# Patient Record
Sex: FEMALE | Race: WHITE | NOT HISPANIC OR LATINO | Employment: OTHER | URBAN - NONMETROPOLITAN AREA
[De-identification: names, ages, dates, MRNs, and addresses within clinical notes are randomized per-mention and may not be internally consistent; named-entity substitution may affect disease eponyms.]

---

## 2018-02-22 RX ORDER — PHENOL 1.4 %
600 AEROSOL, SPRAY (ML) MUCOUS MEMBRANE DAILY
COMMUNITY

## 2018-02-22 RX ORDER — HYDROCHLOROTHIAZIDE 12.5 MG/1
12.5 TABLET ORAL DAILY
COMMUNITY
End: 2021-07-12

## 2018-02-22 RX ORDER — SIMVASTATIN 40 MG
40 TABLET ORAL NIGHTLY
COMMUNITY

## 2018-02-22 RX ORDER — MECLIZINE HYDROCHLORIDE 25 MG/1
25 TABLET ORAL 3 TIMES DAILY PRN
COMMUNITY

## 2018-02-22 RX ORDER — NIACIN 500 MG/1
500 TABLET, EXTENDED RELEASE ORAL NIGHTLY
COMMUNITY
End: 2020-05-11

## 2018-02-22 RX ORDER — FERROUS SULFATE 325(65) MG
325 TABLET ORAL
COMMUNITY

## 2018-02-22 RX ORDER — NIFEDIPINE 60 MG/1
90 TABLET, EXTENDED RELEASE ORAL DAILY
COMMUNITY

## 2018-02-22 RX ORDER — ASPIRIN 81 MG/1
81 TABLET ORAL DAILY
COMMUNITY

## 2018-02-22 RX ORDER — PROBENECID AND COLCHICINE 500; .5 MG/1; MG/1
1 TABLET ORAL DAILY
COMMUNITY
End: 2022-07-12

## 2018-02-22 RX ORDER — QUINAPRIL 20 MG/1
20 TABLET ORAL 2 TIMES DAILY
COMMUNITY

## 2018-02-26 ENCOUNTER — OFFICE VISIT (OUTPATIENT)
Dept: OTOLARYNGOLOGY | Facility: CLINIC | Age: 73
End: 2018-02-26

## 2018-02-26 VITALS
SYSTOLIC BLOOD PRESSURE: 148 MMHG | WEIGHT: 137 LBS | TEMPERATURE: 97.8 F | HEIGHT: 61 IN | BODY MASS INDEX: 25.86 KG/M2 | DIASTOLIC BLOOD PRESSURE: 88 MMHG

## 2018-02-26 DIAGNOSIS — L98.9 SKIN LESION: ICD-10-CM

## 2018-02-26 DIAGNOSIS — R22.1 PULSATILE NECK MASS: Primary | ICD-10-CM

## 2018-02-26 PROCEDURE — 99203 OFFICE O/P NEW LOW 30 MIN: CPT | Performed by: OTOLARYNGOLOGY

## 2018-02-26 PROCEDURE — 11100 PR BIOPSY OF SKIN LESION: CPT | Performed by: OTOLARYNGOLOGY

## 2018-02-26 PROCEDURE — 88305 TISSUE EXAM BY PATHOLOGIST: CPT | Performed by: OTOLARYNGOLOGY

## 2018-02-26 NOTE — PROGRESS NOTES
Michael Loyola MD     Chief Complaint   Patient presents with   • Thyroid Problem     knot on left side of neck   • Skin Lesion     right side of neck       HPI   Neela Awad is a  72 y.o. female who presents for evaluation of a left-sided neck mass.  She reports that she noticed this several months ago.  She has had a history of a benign multinodular goiter for many years.  She has had stable ultrasounds over the last several years.  At one point in time she had a needle biopsy that she reported was benign (I do not have these records).  She reports she had a recent ultrasound from her primary care physician but the records are not available.  She also has had a right sided skin lesion of her neck which she reports is itchy in nature and has been present over the last 2 months.She  reports that she has never smoked. She has never used smokeless tobacco.      Review of Systems:  Reviewed per patient intake note    Past History:  Past Medical History:   Diagnosis Date   • Anemia    • Hyperlipidemia    • Hypertension      Past Surgical History:   Procedure Laterality Date   • COLONOSCOPY     • HYSTERECTOMY     • KNEE SURGERY       Family History   Problem Relation Age of Onset   • Heart disease Other    • Diabetes Other    • Alcohol abuse Other    • Hypertension Other    • Obesity Other    • Kidney disease Other      Social History   Substance Use Topics   • Smoking status: Never Smoker   • Smokeless tobacco: Never Used   • Alcohol use No     Outpatient Prescriptions Marked as Taking for the 2/26/18 encounter (Office Visit) with Michael Loyola MD   Medication Sig Dispense Refill   • aspirin 81 MG EC tablet Take 81 mg by mouth Daily.     • calcium carbonate (OS-EMILIANA) 600 MG tablet Take 600 mg by mouth Daily.     • colchicine-probenecid (COL-BENEMID) 0.5-500 MG tablet Take 1 tablet by mouth Daily.     • ferrous sulfate 325 (65 FE) MG tablet Take 325 mg by mouth Daily With Breakfast.     • Garlic 1000 MG  capsule Take  by mouth.     • hydrochlorothiazide (HYDRODIURIL) 12.5 MG tablet Take 12.5 mg by mouth Daily.     • meclizine (ANTIVERT) 25 MG tablet Take 25 mg by mouth 3 (Three) Times a Day As Needed for dizziness.     • metoprolol succinate XL (TOPROL-XL) 25 MG 24 hr tablet Take 25 mg by mouth Daily.     • niacin (NIASPAN) 500 MG CR tablet Take 500 mg by mouth Every Night.     • NIFEdipine XL (PROCARDIA XL) 60 MG 24 hr tablet Take 60 mg by mouth Daily.     • quinapril (ACCUPRIL) 20 MG tablet Take 20 mg by mouth Every Night.     • simvastatin (ZOCOR) 40 MG tablet Take 40 mg by mouth Every Night.       Allergies:  Allopurinol    Vital Signs:   Temp:  [97.8 °F (36.6 °C)] 97.8 °F (36.6 °C)  BP: (148)/(88) 148/88    Physical Exam   CONSTITUTIONAL: well nourished, well-developed, alert, oriented, in no acute distress   COMMUNICATION AND VOICE: able to communicate normally, normal voice quality  HEAD: normocephalic, no lesions, atraumatic, no tenderness, no masses   FACE: appearance normal, no lesions, no tenderness, no deformities, facial motion symmetric  SALIVARY GLANDS: parotid glands with no tenderness, no swelling, no masses, submandibular glands with normal size, nontender  EYES: ocular motility normal, eyelids normal, orbits normal, no proptosis, conjunctiva normal , pupils equal, round  HEARING: response to conversational voice normal bilaterally   EXTERNAL EARS: auricles without lesions  EXTERNAL EAR CANALS: normal ear canals without stenosis or significant cerumen  TYMPANIC MEMBRANES: tympanic membrane appearance normal, no lesions, no perforation, normal mobility, no fluid  EXTERNAL NOSE: structure normal, no tenderness on palpation, no nasal discharge, no lesions, no evidence of trauma, nostrils patent  INTRANASAL EXAM: nasal mucosa normal, vestibule within normal limits, inferior turbinate normal,  nasal septum without overt anterior deviation  NASOPHARYNX: nasopharyngeal mucosa, adenoids within normal  "limits  LIPS: structure normal, no tenderness on palpation, no lesions, no evidence of trauma  TEETH: dentition within normal limits for age  GUMS: gingivae healthy  ORAL MUCOSA: oral mucosa normal, no mucosal lesions  FLOOR OF MOUTH: Warthin’s duct patent, mucosa normal  TONGUE: lingual mucosa normal without lesions, normal tongue mobility  PALATE: soft and hard palates with normal mucosa and structure, uvular resection present  OROPHARYNX: oropharyngeal mucosa normal, tonsil fossa normal in appearance  HYPOPHARYNX: hypopharyngeal mucosa normal  LARYNX: epiglottis and arytenoid cartilage within normal limits, vocal cord mucosa normal with normal mobility   NECK: There is a left \"neck mass\" that appears to be a prominent bend of the carotid artery.  It is smooth in nature, mobile from left to right, and has a pulsation.  There is also a 7 mm actinic skin lesion in the level II region on the right.  THYROID: no overt thyromegaly, no tenderness, nodules or mass present on palpation, position midline   LYMPH NODES: no lymphadenopathy  CHEST/RESPIRATORY: respiratory effort normal, normal breath sounds  CARDIOVASCULAR: rate and rhythm normal, extremities without cyanosis or edema, no overt jugulovenous distension present  NEUROLOGIC/PSYCHIATRIC: oriented appropriately for age, mood normal, affect appropriate, cranial nerves intact grossly unless specifically mentioned above          Procedure Note    Pre-operative Diagnosis: Skin lesion uncertain behavior of right neck     Post-operative Diagnosis: same    Anesthesia: 1% lidocaine with 1:100,000 epinephrine    Procedure:  Punch biopsy of skin    Procedure Details:    Patient consent was obtained.  The skin was cleansed with alcohol.  The skin was infiltrated with <1 mL of 1% lidocaine with 1-100,000 epinephrine.  After approximately 10 minutes, a 5 millimeter punch biopsy was taken by placing circular motion on the biopsy tool, the skin was elevated and clipped with iris " scissors.  The specimen was sent in formalin.  The skin was closed using a simple 5-0 fast absorbing gut suture.  Antibiotic ointment was placed over the biopsy site.  The patient tolerated the procedure with minimal discomfort.    Condition:  Stable.  Patient tolerated procedure well.    Complications:  None      Assessment   1. Pulsatile neck mass    2. Skin lesion        Plan    A punch biopsy was performed on the right skin lesion.  Orders Placed This Encounter   Procedures   • CT Soft Tissue Neck With Contrast   • Creatine       Return in about 4 weeks (around 3/26/2018).    Michael Loyola MD  02/26/18  11:33 AM

## 2018-02-26 NOTE — PROGRESS NOTES
Patient Intake Note    Review of Systems  Review of Systems   Constitutional: Negative for chills, fatigue and fever.   HENT:        See HPI   Respiratory: Negative for cough, choking, shortness of breath and wheezing.    Cardiovascular: Negative.    Gastrointestinal: Negative for constipation, diarrhea, nausea and vomiting.   Allergic/Immunologic: Negative for environmental allergies and food allergies.   Neurological: Negative for dizziness, light-headedness and headaches.   Hematological: Bruises/bleeds easily.   Psychiatric/Behavioral: Negative for sleep disturbance.       QUALITY MEASURES    Body Mass Index Screening and Follow-Up Plan  Body mass index is 25.89 kg/(m^2).  Patient's BMI is above normal parameters. Follow-up plan includes:  exercise counseling.    Tobacco Use: Screening and Cessation Intervention  Smoking status: Never Smoker                                                              Smokeless status: Never Used                            Loretta Coronado  2/26/2018  10:21 AM

## 2018-02-26 NOTE — PATIENT INSTRUCTIONS

## 2018-03-01 LAB
CYTO UR: NORMAL
LAB AP CASE REPORT: NORMAL
LAB AP CLINICAL INFORMATION: NORMAL
Lab: NORMAL
PATH REPORT.FINAL DX SPEC: NORMAL
PATH REPORT.GROSS SPEC: NORMAL

## 2018-03-03 ENCOUNTER — RESULTS ENCOUNTER (OUTPATIENT)
Dept: OTOLARYNGOLOGY | Facility: CLINIC | Age: 73
End: 2018-03-03

## 2018-03-03 DIAGNOSIS — R22.1 PULSATILE NECK MASS: ICD-10-CM

## 2018-03-07 DIAGNOSIS — R22.1 PULSATILE NECK MASS: ICD-10-CM

## 2018-03-19 ENCOUNTER — OFFICE VISIT (OUTPATIENT)
Dept: NEUROLOGY | Facility: CLINIC | Age: 73
End: 2018-03-19

## 2018-03-19 VITALS
WEIGHT: 138 LBS | DIASTOLIC BLOOD PRESSURE: 80 MMHG | RESPIRATION RATE: 18 BRPM | SYSTOLIC BLOOD PRESSURE: 142 MMHG | BODY MASS INDEX: 26.06 KG/M2 | HEIGHT: 61 IN | HEART RATE: 78 BPM

## 2018-03-19 DIAGNOSIS — R25.1 TREMOR: Primary | ICD-10-CM

## 2018-03-19 DIAGNOSIS — I10 ESSENTIAL (PRIMARY) HYPERTENSION: ICD-10-CM

## 2018-03-19 DIAGNOSIS — R20.0 NUMBNESS OF UPPER EXTREMITY: ICD-10-CM

## 2018-03-19 PROCEDURE — 99204 OFFICE O/P NEW MOD 45 MIN: CPT | Performed by: PSYCHIATRY & NEUROLOGY

## 2018-03-19 RX ORDER — CHOLECALCIFEROL (VITAMIN D3) 125 MCG
500 CAPSULE ORAL EVERY OTHER DAY
COMMUNITY

## 2018-03-19 NOTE — PROGRESS NOTES
Subjective   Neela Awad, 1945, is a female who is being seen today for   Chief Complaint   Patient presents with   • Tremors       HISTORY OF PRESENT ILLNESS: Patient seen for tremor.  Patient's tremors been going on over a year.  She has noticed in both hands in her neck.  Patient does not have any family history of tremor.  Patient is also having left greater than right hand numbness which she wakes up with in the morning and has to shake it out.  Patient denies any definite neck injury or head injury.  Several years ago she fell and hit her head but was not knocked unconscious.  She did not have any severe neck pain but has had some type of biopsy in the right side of her neck and is to have follow-up by Dr. Loyola on that with possible surgery.  Patient had some problems with her left eye and had shots in the eye with bleeding behind the eye supposedly related to her hypertension.  Patient is had noninvasive carotids that did not show any significant abnormalities.  Patient is had no recent blood work.  Patient is had right knee replacement.    REVIEW OF SYSTEMS:   GENERAL: As above  PULMONARY: No acute shortness of breath  CVS:  No acute chest pain  GASTROINTESTINAL: No acute GI distress  GENITOURINARY: No acute  distress  GYN: No acute GYN distress  MUSCULOSKELETAL: As above  HEENT:  As above  ENDOCRINE:  No acute endocrine symptoms  PSYCHIATRIC: No acute psychiatric symptoms  HEMATOLOGY: No blood work for review  SKIN: As above  Family history reviewed and otherwise noncontributory  Social history: Patient denies smoking or drug or alcohol use.    PHYSICAL EXAMINATION:    GENERAL: Patient has no cogwheeling or rigidity.  Patient has mild intentional tremor of the hands bilaterally.  CRANIUM: No specific tenderness over the cranium.  Normocephalic/atraumatic/atraumatic  HEENT: No acute fundic abnormalities.  Pupils equal round reactive to light.       EYES: EOMs intact without nystagmus and fields  full to confrontation       EARS:  Tympanic membranes normal hears tuning fork bilaterally       THROAT: No oropharynx abnormalities       NECK:  No bruits/no lymphadenopathy  CHEST: No acute cardiopulmonary abnormalities by auscultation  ABDOMEN: Nondistended  EXTREMITIES: Pulses symmetrical.  Negative Tinel's wrist and elbow negative Adson's maneuvers.  Negative Phalen's sign  NEURO: Patient alert and follows commands without difficulty  SPEECH:  Normal    CRANIAL NERVES:  Motor sensory about the face normal and symmetric    MOTOR STRENGTH:  Motor strength upper and lower extremities normal  STATION AND GAIT:  Gait normal/Romberg negative and no festination seen  CEREBELLAR:  Finger-nose and heel shin normal  SENSORY:  Patient has normal pin and vibration upper and lower extremities except for slight decrease in vibration in the toes bilaterally  REFLEXES:  Reflexes slightly decreased right knee jerk versus left but this probably relates to her previous knee surgery otherwise no significant abnormalities appreciated.  Toes equivocal      ASSESSMENT AND PLAN:  Patient with mild essential tremor at this point.  Patient is to get baseline MRI brain with her history of significant hypertension.  Patient is to get EMG nerve conduction both upper extremities for bilateral upper extremity numbness.Discussed the patient's BMI with her. BMI is within normal parameters. No follow-up required.       Neela was seen today for tremors.    Diagnoses and all orders for this visit:    Tremor  -     CBC & Differential; Future  -     Comprehensive Metabolic Panel; Future  -     Lipid Panel; Future  -     Magnesium; Future  -     MRI Brain Without Contrast; Future  -     Sedimentation Rate; Future  -     T4, Free; Future  -     Vitamin B12; Future  -     Folate; Future    Numbness of upper extremity  -     EMG & Nerve Conduction Test; Future    Essential (primary) hypertension   -     Lipid Panel; Future    Other orders  -      Cancel: EEG; Future

## 2018-04-09 ENCOUNTER — HOSPITAL ENCOUNTER (OUTPATIENT)
Dept: MRI IMAGING | Facility: HOSPITAL | Age: 73
Discharge: HOME | End: 2018-04-09
Attending: PSYCHIATRY & NEUROLOGY | Admitting: PSYCHIATRY & NEUROLOGY

## 2018-04-09 DIAGNOSIS — R25.1 TREMOR: ICD-10-CM

## 2018-04-09 PROCEDURE — 70551 MRI BRAIN STEM W/O DYE: CPT

## 2018-04-12 ENCOUNTER — OFFICE VISIT (OUTPATIENT)
Dept: OTOLARYNGOLOGY | Facility: CLINIC | Age: 73
End: 2018-04-12

## 2018-04-12 VITALS
SYSTOLIC BLOOD PRESSURE: 138 MMHG | BODY MASS INDEX: 26.06 KG/M2 | DIASTOLIC BLOOD PRESSURE: 82 MMHG | HEIGHT: 61 IN | WEIGHT: 138 LBS

## 2018-04-12 DIAGNOSIS — R22.1 PULSATILE NECK MASS: Primary | ICD-10-CM

## 2018-04-12 DIAGNOSIS — C44.42 SCC (SQUAMOUS CELL CARCINOMA), SCALP/NECK: ICD-10-CM

## 2018-04-12 DIAGNOSIS — E04.1 THYROID NODULE: ICD-10-CM

## 2018-04-12 PROBLEM — L98.9 SKIN LESION: Status: ACTIVE | Noted: 2018-04-12

## 2018-04-12 PROCEDURE — 99214 OFFICE O/P EST MOD 30 MIN: CPT | Performed by: OTOLARYNGOLOGY

## 2018-04-12 NOTE — PROGRESS NOTES
Loretta Coronado   Patient Intake Note    Review of Systems  Review of Systems   Constitutional: Negative for chills, fatigue and fever.   HENT:        See HPI   Respiratory: Negative for cough, choking, shortness of breath and wheezing.    Cardiovascular: Negative.    Allergic/Immunologic: Negative for environmental allergies and food allergies.   Neurological: Negative for dizziness, light-headedness and headaches.   Hematological: Does not bruise/bleed easily.   Psychiatric/Behavioral: Negative for sleep disturbance.       QUALITY MEASURES    Body Mass Index Screening and Follow-Up Plan  Body mass index is 26.07 kg/m².  Patient's Body mass index is 26.07 kg/m². BMI is above normal parameters. Follow-up plan includes:  exercise counseling.    Tobacco Use: Screening and Cessation Intervention  Smoking status: Never Smoker                                                              Smokeless tobacco: Never Used                            Loretta Coronado  4/12/2018  2:55 PM

## 2018-04-12 NOTE — PROGRESS NOTES
Mcihael Loyola MD     Chief Complaint   Patient presents with   • Follow-up     s/p punch biopsy neck mass       HPI   Neela Awad is a  72 y.o. female who is here for follow up. She had a recent CT scan of the neck which did not show mass or lesion.  I suspect her neck mass she was worried about is a prominent carotid artery on the left.  She did have thyroid nodules that showed up on the CT scan which have been noted on several previous evaluations.  She is due for a thyroid ultrasound to reevaluate it.  She also had a biopsy of her right neck which showed squamous cell carcinoma and she is scheduled to have this excised neck week.  She has several other areas on her hand that was she wants to be set up for a dermatology evaluation.    Review of Systems:  Reviewed per patient intake note    Past History:  Past medical and surgical history, family history and social history reviewed and updated when appropriate.  Current medications and allergies reviewed and updated when appropriate.  Allergies:  Allopurinol    Vital Signs:   BP: (138)/(82) 138/82    Physical Exam   CONSTITUTIONAL: well nourished, well-developed, alert, oriented, in no acute distress   COMMUNICATION AND VOICE: able to communicate normally, normal voice quality  HEAD: normocephalic, no lesions, atraumatic, no tenderness, no masses   FACE: appearance normal, no lesions, no tenderness, no deformities, facial motion symmetric  EYES: ocular motility normal, eyelids normal, orbits normal, no proptosis, conjunctiva normal , pupils equal, round  HEARING: response to conversational voice normal bilaterally   EXTERNAL EARS: auricles without lesions  EXTERNAL NOSE: structure normal, no tenderness on palpation, no nasal discharge, no lesions, no evidence of trauma, nostrils patent  LIPS: structure normal, no tenderness on palpation, no lesions, no evidence of trauma  NECK: There is a nondescript right level II sclerotic lesion of the skin, there is a  prominent left carotid artery bulb  THYROID: diffuse nodular thyroid present  LYMPH NODES: no lymphadenopathy  CHEST/RESPIRATORY: respiratory effort normal  CARDIOVASCULAR: extremities without cyanosis or edema, no overt jugulovenous distension present  NEUROLOGIC/PSYCHIATRIC: oriented appropriately for age, mood normal, affect appropriate, cranial nerves intact grossly unless specifically mentioned above         Assessment   1. Pulsatile neck mass    2. SCC (squamous cell carcinoma), scalp/neck    3. Thyroid nodule        Plan    Orders Placed This Encounter   Procedures   • US Thyroid   • TSH     She is scheduled for office excision of the right neck skin lesion.  We will get the ultrasound prior to the excision.      Michael Loyola MD  04/12/18  3:13 PM

## 2018-04-20 ENCOUNTER — CLINICAL SUPPORT (OUTPATIENT)
Dept: OTOLARYNGOLOGY | Facility: CLINIC | Age: 73
End: 2018-04-20

## 2018-04-20 ENCOUNTER — PROCEDURE VISIT (OUTPATIENT)
Dept: OTOLARYNGOLOGY | Facility: CLINIC | Age: 73
End: 2018-04-20

## 2018-04-20 DIAGNOSIS — E04.1 THYROID NODULE: ICD-10-CM

## 2018-04-20 DIAGNOSIS — C44.42 SQUAMOUS CELL CANCER OF SCALP AND SKIN OF NECK: ICD-10-CM

## 2018-04-20 DIAGNOSIS — E04.2 NONTOXIC MULTINODULAR GOITER: Primary | ICD-10-CM

## 2018-04-20 PROCEDURE — 88305 TISSUE EXAM BY PATHOLOGIST: CPT | Performed by: OTOLARYNGOLOGY

## 2018-04-20 PROCEDURE — 99213 OFFICE O/P EST LOW 20 MIN: CPT | Performed by: OTOLARYNGOLOGY

## 2018-04-20 PROCEDURE — 76536 US EXAM OF HEAD AND NECK: CPT | Performed by: OTOLARYNGOLOGY

## 2018-04-20 NOTE — PROGRESS NOTES
Michael Loyola MD   Procedure Note    Pre-operative Diagnosis:   right, neck, squamous cell carcinoma in situ    Post-operative Diagnosis: same    Anesthesia:   1% lidocaine with 1:100,000 epinephrine    Procedure:    Excision of right, level II neck, squamous cell carcinoma in situ of the skin 3 cm x 1.5 cm with primary closure    Procedure Details:    The risk benefits and alternatives were discussed and consent was given.The site was marked and injected with 1% lidocaine with 1:100,000 epinephrine. The site was prepped and draped in the usual manner. An excision was created around the right level II neck lesion with at least 3-4 mm margins. The excision was carried down to the subcutaneous fat. The lesion was then taken up off the deep tissue and removed off the patient. The specimen was marked for orientation and sent in formalin for permanent section.    Findings:   superficial and nondescript lesion with scarring at the previous biopsy site    Condition:  Stable.  Patient tolerated procedure well.    Complications:  None    Michael Loyola MD  4/20/2018  3:49 PM

## 2018-04-20 NOTE — PROGRESS NOTES
Michael Loyola MD   Chief complaint : Follow up thyroid problems    Neela Awad is a 72 y.o. female who presents for follow up of thyroid problems. She has had a history of thyroid nodules and had her ultrasound today.  This showed bilateral thyroid nodules.  She reports he is had benign fine-needle aspirations but these were many years ago.  She is here also to have a skin lesion taken off of the neck.    Review of Systems  Reviewed as per patient intake note.    History  Past History:  Past medical and surgical history, family history and social history reviewed and updated when appropriate.  Current medications and allergies reviewed and updated when appropriate.  Allergies:  Allopurinol    Vital Signs  AVSS    Physical Exam:  CONSTITUTIONAL: well nourished, well-developed, alert, oriented, in no acute distress   COMMUNICATION AND VOICE: able to communicate normally, normal voice quality  HEAD: normocephalic, no lesions, atraumatic, no tenderness, no masses   FACE: appearance normal, no lesions, no tenderness, no deformities, facial motion symmetric  EYES: ocular motility normal, eyelids normal, orbits normal, no proptosis, conjunctiva normal , pupils equal, round   EARS:  Hearing: hearing to conversational voice intact bilaterally   External Ears: normal bilaterally, no lesions  NOSE:  External Nose: external nasal structure normal, no tenderness on palpation, no nasal discharge, no lesions, no evidence of trauma, nostrils patent   ORAL:  Lips: upper and lower lips without lesion   NECK:  Inspection and Palpation: neck appearance normal, no masses or tenderness  THYROID: non-tender, no mass, nodular texture present,  CHEST/RESPIRATORY: normal respiratory effort   CARDIOVASCULAR: no cyanosis or edema   NEUROLOGICAL/PSYCHIATRIC: oriented to time, place and person, mood normal, affect appropriate, CN II-XII intact grossly        Assessment   1. Nontoxic multinodular goiter    2. Squamous cell cancer of  scalp and skin of neck        Plan   Medical and surgical options were discussed including observation vs ultrasound guided needle biopsy vs thyroidectomy. Risks, benefits and alternatives were discussed and questions were answered. After considering the options, the patient decided to proceed needle biopsy.     Orders Placed This Encounter   Procedures   • US Guided Fine Needle Aspiration   • US Guided Fine Needle Aspiration       Return in about 6 weeks (around 6/1/2018).      Michael Loyola MD  04/20/18  3:51 PM

## 2018-05-17 ENCOUNTER — HOSPITAL ENCOUNTER (OUTPATIENT)
Dept: NEUROLOGY | Facility: HOSPITAL | Age: 73
Discharge: HOME | End: 2018-05-17
Attending: PSYCHIATRY & NEUROLOGY | Admitting: PSYCHIATRY & NEUROLOGY

## 2018-05-17 DIAGNOSIS — R20.0 NUMBNESS OF UPPER EXTREMITY: ICD-10-CM

## 2018-05-17 PROCEDURE — 95886 MUSC TEST DONE W/N TEST COMP: CPT

## 2018-05-17 PROCEDURE — 95911 NRV CNDJ TEST 9-10 STUDIES: CPT

## 2018-05-17 PROCEDURE — 95911 NRV CNDJ TEST 9-10 STUDIES: CPT | Performed by: PSYCHIATRY & NEUROLOGY

## 2018-05-17 PROCEDURE — 95886 MUSC TEST DONE W/N TEST COMP: CPT | Performed by: PSYCHIATRY & NEUROLOGY

## 2018-05-31 DIAGNOSIS — I10 ESSENTIAL (PRIMARY) HYPERTENSION: ICD-10-CM

## 2018-05-31 DIAGNOSIS — R25.1 TREMOR: ICD-10-CM

## 2018-06-14 DIAGNOSIS — R25.1 TREMOR: ICD-10-CM

## 2018-07-09 ENCOUNTER — HOSPITAL ENCOUNTER (OUTPATIENT)
Dept: ULTRASOUND IMAGING | Facility: HOSPITAL | Age: 73
Discharge: HOME | End: 2018-07-09
Attending: OTOLARYNGOLOGY | Admitting: OTOLARYNGOLOGY

## 2018-07-09 VITALS — HEART RATE: 85 BPM | DIASTOLIC BLOOD PRESSURE: 68 MMHG | SYSTOLIC BLOOD PRESSURE: 157 MMHG

## 2018-07-09 DIAGNOSIS — E04.2 NONTOXIC MULTINODULAR GOITER: ICD-10-CM

## 2018-07-09 PROCEDURE — 88177 CYTP FNA EVAL EA ADDL: CPT | Performed by: OTOLARYNGOLOGY

## 2018-07-09 PROCEDURE — 76536 US EXAM OF HEAD AND NECK: CPT

## 2018-07-09 PROCEDURE — 88172 CYTP DX EVAL FNA 1ST EA SITE: CPT | Performed by: OTOLARYNGOLOGY

## 2018-07-09 PROCEDURE — 88162 CYTOPATH SMEAR OTHER SOURCE: CPT | Performed by: OTOLARYNGOLOGY

## 2018-07-09 PROCEDURE — 76942 ECHO GUIDE FOR BIOPSY: CPT

## 2018-07-09 PROCEDURE — 88334 PATH CONSLTJ SURG CYTO XM EA: CPT | Performed by: OTOLARYNGOLOGY

## 2018-07-09 PROCEDURE — 88173 CYTOPATH EVAL FNA REPORT: CPT | Performed by: OTOLARYNGOLOGY

## 2018-07-11 ENCOUNTER — TELEPHONE (OUTPATIENT)
Dept: NEUROLOGY | Facility: CLINIC | Age: 73
End: 2018-07-11

## 2018-07-11 NOTE — TELEPHONE ENCOUNTER
Neela said she received a letter that we had not received her bloodwork.  She had bloodwork done in March, copy of lab in [place].

## 2018-07-12 LAB
CYTO UR: NORMAL
LAB AP CASE REPORT: NORMAL
Lab: NORMAL
PATH REPORT.FINAL DX SPEC: NORMAL
PATH REPORT.GROSS SPEC: NORMAL

## 2018-07-13 ENCOUNTER — TELEPHONE (OUTPATIENT)
Dept: OTOLARYNGOLOGY | Facility: CLINIC | Age: 73
End: 2018-07-13

## 2018-07-16 ENCOUNTER — OFFICE VISIT (OUTPATIENT)
Dept: NEUROLOGY | Facility: CLINIC | Age: 73
End: 2018-07-16

## 2018-07-16 VITALS
HEIGHT: 61 IN | WEIGHT: 135 LBS | SYSTOLIC BLOOD PRESSURE: 128 MMHG | HEART RATE: 73 BPM | DIASTOLIC BLOOD PRESSURE: 77 MMHG | BODY MASS INDEX: 25.49 KG/M2 | OXYGEN SATURATION: 97 %

## 2018-07-16 DIAGNOSIS — G25.0 ESSENTIAL TREMOR: ICD-10-CM

## 2018-07-16 DIAGNOSIS — G56.03 BILATERAL CARPAL TUNNEL SYNDROME: Primary | ICD-10-CM

## 2018-07-16 PROCEDURE — 99213 OFFICE O/P EST LOW 20 MIN: CPT | Performed by: CLINICAL NURSE SPECIALIST

## 2018-07-16 NOTE — PROGRESS NOTES
Subjective     Chief Complaint   Patient presents with   • Tremors     Patient states that her tremors have been doing better however both hands are still going numb. Patient states the only way she gets any relief and feeling back is if she dangles her hands and shakes them.       Neela Awad is a 72 y.o. 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 Patient is here today for follow up for essential tremor and bilateral hand numbness, left more than right. She was last seen by Dr. Ugarte 3/19/18. She states tremor is about the same, does not interfere with ADLs. She feels like hand numbness has worsened. She did have labs, NCV/EMG, and MRI brain. I have reviewed results with the patient. Since last visit patient did have FNA for thyroid nodules. She has no new complaints today.   MRI brain shows chronic microvascular disease and volume loss.     Neurologic Problem   The patient's pertinent negatives include no weakness. Primary symptoms comment: tremors. This is a chronic problem. The current episode started more than 1 year ago. The neurological problem developed gradually. The problem is unchanged. Pertinent negatives include no confusion, dizziness, fatigue, nausea or vomiting. (There is change in handwriting, no difficulty holding a cup in hand, no change if rushed or in a hurry, does not drink alcohol so uncertain if would effect tremor) Past treatments include nothing. Improvement on treatment: no family hx of tremors. (No family hx of tremors)   Upper Extremity Issue   This is a chronic (bilateral hand numbness left more than right) problem. The current episode started more than 1 year ago (2017). The problem has been gradually worsening. Associated symptoms include numbness (both hands). Pertinent negatives include no arthralgias, fatigue, nausea, vomiting or weakness. Associated symptoms comments: More hand numbness when driving, notice when sitting and is usually resting elbows on objects. Does  not drop things. Can shake her hands makes feel better.     . Exacerbated by: patient does repetive movements at the restaurant. She has tried nothing for the symptoms. The treatment provided no relief.        Current Outpatient Prescriptions   Medication Sig Dispense Refill   • aspirin 81 MG EC tablet Take 81 mg by mouth Daily.     • calcium carbonate (OS-EMILIANA) 600 MG tablet Take 600 mg by mouth Daily.     • colchicine-probenecid (COL-BENEMID) 0.5-500 MG tablet Take 1 tablet by mouth Daily.     • ferrous sulfate 325 (65 FE) MG tablet Take 325 mg by mouth Daily With Breakfast.     • Garlic 1000 MG capsule Take  by mouth.     • hydrochlorothiazide (HYDRODIURIL) 12.5 MG tablet Take 12.5 mg by mouth Daily.     • meclizine (ANTIVERT) 25 MG tablet Take 25 mg by mouth 3 (Three) Times a Day As Needed for dizziness.     • metoprolol succinate XL (TOPROL-XL) 25 MG 24 hr tablet Take 25 mg by mouth Daily.     • niacin (NIASPAN) 500 MG CR tablet Take 500 mg by mouth Every Night.     • NIFEdipine XL (PROCARDIA XL) 60 MG 24 hr tablet Take 60 mg by mouth Daily.     • quinapril (ACCUPRIL) 20 MG tablet Take 20 mg by mouth Every Night.     • simvastatin (ZOCOR) 40 MG tablet Take 40 mg by mouth As Needed.     • vitamin B-12 (CYANOCOBALAMIN) 500 MCG tablet Take 500 mcg by mouth 1 (One) Time Per Week.       No current facility-administered medications for this visit.        Past Medical History:   Diagnosis Date   • Anemia    • Hyperlipidemia    • Hypertension    • SCC (squamous cell carcinoma), scalp/neck     right neck       Past Surgical History:   Procedure Laterality Date   • COLONOSCOPY     • HYSTERECTOMY     • KNEE SURGERY         family history includes Alcohol abuse in her other; Diabetes in her other; Heart disease in her other; Hypertension in her other; Kidney disease in her other; Obesity in her other.    Social History   Substance Use Topics   • Smoking status: Never Smoker   • Smokeless tobacco: Never Used   • Alcohol use  "No       Review of Systems   Constitutional: Negative.  Negative for fatigue.   HENT: Negative.    Eyes: Negative.  Negative for visual disturbance.   Respiratory: Negative.  Negative for apnea and choking.    Cardiovascular: Negative.    Gastrointestinal: Negative.  Negative for blood in stool, constipation, diarrhea, nausea and vomiting.   Endocrine: Negative.    Genitourinary: Negative.  Negative for dysuria and frequency.   Musculoskeletal: Negative for arthralgias and gait problem.   Skin: Negative.    Allergic/Immunologic: Negative.    Neurological: Positive for tremors and numbness (both hands). Negative for dizziness and weakness.   Hematological: Negative.    Psychiatric/Behavioral: Negative.  Negative for agitation, confusion and hallucinations.   All other systems reviewed and are negative.      Objective     /77 (BP Location: Left arm, Patient Position: Sitting, Cuff Size: Adult)   Pulse 73   Ht 154.9 cm (61\")   Wt 61.2 kg (135 lb)   SpO2 97%   Breastfeeding? No   BMI 25.51 kg/m² , Body mass index is 25.51 kg/m².    Physical Exam   Constitutional: She is oriented to person, place, and time. Vital signs are normal. She appears well-developed and well-nourished. She is cooperative.   HENT:   Head: Normocephalic and atraumatic.   Right Ear: Hearing and external ear normal.   Left Ear: Hearing and external ear normal.   Nose: Nose normal.   Mouth/Throat: Oropharynx is clear and moist.   Eyes: Conjunctivae, EOM and lids are normal. Pupils are equal, round, and reactive to light. Right eye exhibits normal extraocular motion and no nystagmus. Left eye exhibits normal extraocular motion and no nystagmus. Right pupil is round and reactive. Left pupil is round and reactive. Pupils are equal.   Neck: Normal range of motion. Neck supple. Carotid bruit is not present.   Cardiovascular: Normal rate, regular rhythm and normal heart sounds.    No murmur heard.  Pulmonary/Chest: Effort normal and breath " sounds normal. She has no decreased breath sounds. She has no wheezes. She has no rhonchi. She has no rales.   Abdominal: Soft. Bowel sounds are normal.   Musculoskeletal: Normal range of motion.   Neurological: She is alert and oriented to person, place, and time. She has normal strength and normal reflexes. She displays tremor (intermittent horizontal head tremor, no tongue tremor, does have tremor with intention. see line/spiral test). No cranial nerve deficit or sensory deficit. She displays a negative Romberg sign. Coordination (no ataxia, FTN, HTS intact bilateral) and gait normal.   Reflex Scores:       Tricep reflexes are 2+ on the right side and 2+ on the left side.       Bicep reflexes are 2+ on the right side and 2+ on the left side.       Brachioradialis reflexes are 2+ on the right side and 2+ on the left side.       Patellar reflexes are 2+ on the right side and 2+ on the left side.       Achilles reflexes are 2+ on the right side and 2+ on the left side.  Awake, alert. No aphasia, no dysarthria  Completes simple and complex commands    CN II:  Visual fields full.  Pupils equally reactive to light  CN III, IV, VI:  Extraocular Muscles full with no signs of nystagmus  CN V:  Facial sensory is symmetric with no asymetries.  CN VII:  Facial motor symmetric  CN VIII:  Gross hearing intact bilaterally  CN IX:  Palate elevates symmetrically  CN X:  Palate elevates symmetrically  CN XI:  Shoulder shrug symmetric  CN XII:  Tongue is midline on protrusion    Full and symmetric strength bilateral upper and lower extremities.   Skin: Skin is warm and dry.   Psychiatric: She has a normal mood and affect. Her speech is normal and behavior is normal. Cognition and memory are normal.   Nursing note and vitals reviewed.        MRI BRAIN: IMPRESSION:  1.  No acute intracranial abnormalities.  2.  Chronic microvessel changes and volume loss.    EMG: IMPRESSION:  This study may be consistent with generalized neuropathy  plus   the possibility of peripheral median and ulnar nerve irritations at some   point without acute denervation.  I cannot rule out proximal nerve/nerve   root or plexus abnormality or thoracic outlet syndrome as contributory.    Again this must be correlated with patient's presentation      NCV: Impression      Study suggests probable median nerve entrapment at or distal   to the wrist bilaterally.  There is also possibly generalized neuropathy   contributory and I cannot rule out proximal nerve/nerve root or plexus   abnormality or thoracic outlet syndrome as contributory.  This must be   correlated with patient's presentation.    ASSESSMENT/PLAN    Diagnoses and all orders for this visit:    Bilateral carpal tunnel syndrome  -      Wrist Hand Orthosis, Wrist Extension Control Cock-up    Essential tremor       MEDICAL DECISION MAKIN. Continue with toprol XL for dual therapy for cardiac reasons and essential tremor  2. Conservative treatment for CTS. Patient to wear cock up splints at night and during the day when able.  3. Essential tremor is mild at this point and not interfering with ADLs or daily activities. Will monitor.  4. Patient's Body mass index is 25.51 kg/m². BMI is above normal parameters. Recommendations include: educational material.  5. Fall Risk Assessment  Fallen in past 6 months: 0--> No  Mental Status: 0--> no mental status change  Mobility: 0--> No mobility issues  Medications: 0--> No meds  Total Fall Risk Score: 2     allergies and all known medications/prescriptions have been reviewed using resources available on this encounter.    Return in about 4 months (around 2018).        Marlene Al, APRN

## 2018-07-16 NOTE — PATIENT INSTRUCTIONS
Exercising to Lose Weight  Exercising can help you to lose weight. In order to lose weight through exercise, you need to do vigorous-intensity exercise. You can tell that you are exercising with vigorous intensity if you are breathing very hard and fast and cannot hold a conversation while exercising.  Moderate-intensity exercise helps to maintain your current weight. You can tell that you are exercising at a moderate level if you have a higher heart rate and faster breathing, but you are still able to hold a conversation.  How often should I exercise?  Choose an activity that you enjoy and set realistic goals. Your health care provider can help you to make an activity plan that works for you. Exercise regularly as directed by your health care provider. This may include:  · Doing resistance training twice each week, such as:  ? Push-ups.  ? Sit-ups.  ? Lifting weights.  ? Using resistance bands.  · Doing a given intensity of exercise for a given amount of time. Choose from these options:  ? 150 minutes of moderate-intensity exercise every week.  ? 75 minutes of vigorous-intensity exercise every week.  ? A mix of moderate-intensity and vigorous-intensity exercise every week.    Children, pregnant women, people who are out of shape, people who are overweight, and older adults may need to consult a health care provider for individual recommendations. If you have any sort of medical condition, be sure to consult your health care provider before starting a new exercise program.  What are some activities that can help me to lose weight?  · Walking at a rate of at least 4.5 miles an hour.  · Jogging or running at a rate of 5 miles per hour.  · Biking at a rate of at least 10 miles per hour.  · Lap swimming.  · Roller-skating or in-line skating.  · Cross-country skiing.  · Vigorous competitive sports, such as football, basketball, and soccer.  · Jumping rope.  · Aerobic dancing.  How can I be more active in my day-to-day  activities?  · Use the stairs instead of the elevator.  · Take a walk during your lunch break.  · If you drive, park your car farther away from work or school.  · If you take public transportation, get off one stop early and walk the rest of the way.  · Make all of your phone calls while standing up and walking around.  · Get up, stretch, and walk around every 30 minutes throughout the day.  What guidelines should I follow while exercising?  · Do not exercise so much that you hurt yourself, feel dizzy, or get very short of breath.  · Consult your health care provider prior to starting a new exercise program.  · Wear comfortable clothes and shoes with good support.  · Drink plenty of water while you exercise to prevent dehydration or heat stroke. Body water is lost during exercise and must be replaced.  · Work out until you breathe faster and your heart beats faster.  This information is not intended to replace advice given to you by your health care provider. Make sure you discuss any questions you have with your health care provider.  Document Released: 01/20/2012 Document Revised: 05/25/2017 Document Reviewed: 05/21/2015  [employer] Patient Education © 2018 [employer]  Multiple Sclerosis  Multiple sclerosis (MS) is a disease of the central nervous system. It leads to the loss of the insulating covering of the nerves (myelin sheath) of your brain. When this happens, brain signals do not get sent properly or may not get sent at all. The age of onset of MS varies.  What are the causes?  The cause of MS is unknown. However, it is more common in the [place] than in the [place].  What increases the risk?  There is a higher number of women with MS than men. MS is not an illness that is passed down to you from your family members (inherited). However, your risk of MS is higher if you have a relative with MS.  What are the signs or symptoms?  The symptoms of MS occur in episodes  or attacks. These attacks may last weeks to months. There may be long periods of almost no symptoms between attacks. The symptoms of MS vary. This is because of the many different ways it affects the central nervous system. The main symptoms of MS include:  · Vision problems and eye pain.  · Numbness.  · Weakness.  · Inability to move your arms, hands, feet, or legs (paralysis).  · Balance problems.  · Tremors.    How is this diagnosed?  Your health care provider can diagnose MS with the help of imaging exams and lab tests. These may include specialized X-ray exams and spinal fluid tests. The best imaging exam to confirm a diagnosis of MS is an MRI.  How is this treated?  There is no known cure for MS, but there are medicines that can decrease the number and frequency of attacks. Steroids are often used for short-term relief. Physical and occupational therapy may also help. There are also many new alternative or complementary treatments available to help control the symptoms of MS. Ask your health care provider if any of these other options are right for you.  Follow these instructions at home:  · Take medicines as directed by your health care provider.  · Exercise as directed by your health care provider.  Contact a health care provider if:  You begin to feel depressed.  Get help right away if:  · You develop paralysis.  · You have problems with bladder, bowel, or sexual function.  · You develop mental changes, such as forgetfulness or mood swings.  · You have a period of uncontrolled movements (seizure).  This information is not intended to replace advice given to you by your health care provider. Make sure you discuss any questions you have with your health care provider.  Document Released: 12/15/2001 Document Revised: 05/25/2017 Document Reviewed: 08/25/2014  [employer] Interactive Patient Education © 2017 [employer]

## 2018-11-19 ENCOUNTER — OFFICE VISIT (OUTPATIENT)
Dept: NEUROLOGY | Facility: CLINIC | Age: 73
End: 2018-11-19

## 2018-11-19 VITALS
HEART RATE: 76 BPM | SYSTOLIC BLOOD PRESSURE: 122 MMHG | WEIGHT: 139 LBS | DIASTOLIC BLOOD PRESSURE: 72 MMHG | BODY MASS INDEX: 26.24 KG/M2 | HEIGHT: 61 IN

## 2018-11-19 DIAGNOSIS — G56.03 BILATERAL CARPAL TUNNEL SYNDROME: ICD-10-CM

## 2018-11-19 DIAGNOSIS — G25.0 ESSENTIAL TREMOR: Primary | ICD-10-CM

## 2018-11-19 PROCEDURE — 99213 OFFICE O/P EST LOW 20 MIN: CPT | Performed by: CLINICAL NURSE SPECIALIST

## 2018-11-19 NOTE — PROGRESS NOTES
Subjective     Chief Complaint   Patient presents with   • Tremors     No change   • Bilateral carpal tunnel syndrome     No change- no new concerns or complaints. She states she did not get an order for wrist splints when I asked her about this. 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 She was last seen 7/18. She states tremor is about the same, does not interfere with ADLs. She notices more tremor with fine motor when she is painting arts/crafts. She does at times notice mild head and voice tremor. She feels like hand numbness has worsened. She was to get wrist splints bilateral but has not done so.   She has no new complaints. She has hx of anemia, HTN, dyslipidemia, SCC.     Neurologic Problem   The patient's pertinent negatives include no weakness. Primary symptoms comment: tremors. This is a chronic problem. The current episode started more than 1 year ago. The neurological problem developed gradually. The problem is unchanged. Pertinent negatives include no confusion, dizziness, fatigue, nausea or vomiting. (There is change in handwriting, no difficulty holding a cup in hand, no change if rushed or in a hurry, does not drink alcohol so uncertain if would effect tremor) Past treatments include nothing. Improvement on treatment: no family hx of tremors. (No family hx of tremors)   Upper Extremity Issue   This is a chronic (bilateral hand numbness left more than right) problem. The current episode started more than 1 year ago (2017). The problem has been gradually worsening. Associated symptoms include numbness (both hands). Pertinent negatives include no arthralgias, fatigue, nausea, vomiting or weakness. Associated symptoms comments: More hand numbness when driving, notice when sitting and is usually resting  elbows on objects. Does not drop things. Can shake her hands makes feel better.     . Exacerbated by: patient does repetive movements at the restaurant. She has tried nothing for the symptoms. The treatment provided no relief.        Current Outpatient Medications   Medication Sig Dispense Refill   • aspirin 81 MG EC tablet Take 81 mg by mouth Daily.     • calcium carbonate (OS-EMILIANA) 600 MG tablet Take 600 mg by mouth Daily.     • colchicine-probenecid (COL-BENEMID) 0.5-500 MG tablet Take 1 tablet by mouth Daily.     • ferrous sulfate 325 (65 FE) MG tablet Take 325 mg by mouth Daily With Breakfast.     • Garlic 1000 MG capsule Take  by mouth.     • hydrochlorothiazide (HYDRODIURIL) 12.5 MG tablet Take 12.5 mg by mouth Daily.     • meclizine (ANTIVERT) 25 MG tablet Take 25 mg by mouth 3 (Three) Times a Day As Needed for dizziness.     • metoprolol succinate XL (TOPROL-XL) 25 MG 24 hr tablet Take 25 mg by mouth Daily.     • niacin (NIASPAN) 500 MG CR tablet Take 500 mg by mouth Every Night.     • NIFEdipine XL (PROCARDIA XL) 60 MG 24 hr tablet Take 60 mg by mouth Daily.     • quinapril (ACCUPRIL) 20 MG tablet Take 20 mg by mouth Every Night.     • simvastatin (ZOCOR) 40 MG tablet Take 40 mg by mouth As Needed.     • vitamin B-12 (CYANOCOBALAMIN) 500 MCG tablet Take 500 mcg by mouth 1 (One) Time Per Week.       No current facility-administered medications for this visit.        Past Medical History:   Diagnosis Date   • Anemia    • Hyperlipidemia    • Hypertension    • SCC (squamous cell carcinoma), scalp/neck     right neck       Past Surgical History:   Procedure Laterality Date   • COLONOSCOPY     • HYSTERECTOMY     • KNEE SURGERY         family history includes Alcohol abuse in her other; Diabetes in her other; Heart disease in her other; Hypertension in her other; Kidney disease in her other; Obesity in her other.    Social History     Tobacco Use   • Smoking status: Never Smoker   • Smokeless tobacco: Never Used  "  Substance Use Topics   • Alcohol use: No   • Drug use: No       Review of Systems   Constitutional: Negative.  Negative for fatigue.   HENT: Negative.  Negative for postnasal drip and sinus pressure.    Eyes: Negative.  Negative for visual disturbance.   Respiratory: Negative.  Negative for apnea and choking.    Cardiovascular: Negative.    Gastrointestinal: Negative.  Negative for blood in stool, constipation, diarrhea, nausea and vomiting.   Endocrine: Negative.  Negative for cold intolerance and heat intolerance.   Genitourinary: Negative.  Negative for dysuria and frequency.   Musculoskeletal: Negative for arthralgias and gait problem.   Skin: Negative.    Allergic/Immunologic: Negative.    Neurological: Positive for tremors and numbness (both hands). Negative for dizziness and weakness.   Hematological: Negative.    Psychiatric/Behavioral: Negative.  Negative for agitation, confusion and hallucinations.   All other systems reviewed and are negative.      Objective     /72   Pulse 76   Ht 154.9 cm (61\")   Wt 63 kg (139 lb)   BMI 26.26 kg/m² , Body mass index is 26.26 kg/m².    Physical Exam   Constitutional: She is oriented to person, place, and time. Vital signs are normal. She appears well-developed and well-nourished. She is cooperative.   HENT:   Head: Normocephalic and atraumatic.   Right Ear: Hearing and external ear normal.   Left Ear: Hearing and external ear normal.   Nose: Nose normal.   Mouth/Throat: Oropharynx is clear and moist.   Eyes: Conjunctivae, EOM and lids are normal. Pupils are equal, round, and reactive to light. Right eye exhibits normal extraocular motion and no nystagmus. Left eye exhibits normal extraocular motion and no nystagmus. Right pupil is round and reactive. Left pupil is round and reactive. Pupils are equal.   Neck: Normal range of motion. Neck supple. Carotid bruit is not present.   Cardiovascular: Normal rate, regular rhythm and normal heart sounds.   No murmur " heard.  Pulmonary/Chest: Effort normal and breath sounds normal. She has no decreased breath sounds. She has no wheezes. She has no rhonchi. She has no rales.   Abdominal: Soft. Bowel sounds are normal.   Musculoskeletal: Normal range of motion.   Neurological: She is alert and oriented to person, place, and time. She has normal strength and normal reflexes. She displays tremor (intermittent horizontal head tremor, no tongue tremor, does have tremor with intention. see line/spiral test). No cranial nerve deficit or sensory deficit. She displays a negative Romberg sign. Coordination (no ataxia, FTN, HTS intact bilateral) and gait normal.   Reflex Scores:       Tricep reflexes are 2+ on the right side and 2+ on the left side.       Bicep reflexes are 2+ on the right side and 2+ on the left side.       Brachioradialis reflexes are 2+ on the right side and 2+ on the left side.       Patellar reflexes are 2+ on the right side and 2+ on the left side.       Achilles reflexes are 2+ on the right side and 2+ on the left side.  Awake, alert. No aphasia, no dysarthria  Completes simple and complex commands    CN II:  Visual fields full.  Pupils equally reactive to light  CN III, IV, VI:  Extraocular Muscles full with no signs of nystagmus  CN V:  Facial sensory is symmetric with no asymetries.  CN VII:  Facial motor symmetric  CN VIII:  Gross hearing intact bilaterally  CN IX:  Palate elevates symmetrically  CN X:  Palate elevates symmetrically  CN XI:  Shoulder shrug symmetric  CN XII:  Tongue is midline on protrusion    Full and symmetric strength bilateral upper and lower extremities.   Skin: Skin is warm and dry.   Psychiatric: She has a normal mood and affect. Her speech is normal and behavior is normal. Cognition and memory are normal.   Nursing note and vitals reviewed.           ASSESSMENT/PLAN    Diagnoses and all orders for this visit:    Essential tremor    Bilateral carpal tunnel syndrome      MEDICAL DECISION  MAKIN. Continue with toprol XL for dual therapy for cardiac reasons and essential tremor. Patient states tremor mild and not interested in starting other medications at this time.   2. Conservative treatment for CTS. Patient to wear cock up splints at night and during the day when able.  3. Essential tremor is mild at this point and not interfering with ADLs or daily activities. Will monitor.  4. Patient's Body mass index is 25.51 kg/m². BMI is above normal parameters. Recommendations include: educational material.      HPI and ROS reviewed and updated.      allergies and all known medications/prescriptions have been reviewed using resources available on this encounter.    Return in about 6 months (around 2019).        Marlene Al, APRN

## 2019-05-20 ENCOUNTER — OFFICE VISIT (OUTPATIENT)
Dept: NEUROLOGY | Facility: CLINIC | Age: 74
End: 2019-05-20

## 2019-05-20 VITALS
SYSTOLIC BLOOD PRESSURE: 124 MMHG | WEIGHT: 141 LBS | BODY MASS INDEX: 26.62 KG/M2 | DIASTOLIC BLOOD PRESSURE: 78 MMHG | HEART RATE: 62 BPM | HEIGHT: 61 IN

## 2019-05-20 DIAGNOSIS — G25.0 ESSENTIAL TREMOR: Primary | ICD-10-CM

## 2019-05-20 DIAGNOSIS — G56.03 BILATERAL CARPAL TUNNEL SYNDROME: ICD-10-CM

## 2019-05-20 PROCEDURE — 99213 OFFICE O/P EST LOW 20 MIN: CPT | Performed by: CLINICAL NURSE SPECIALIST

## 2019-05-20 NOTE — PROGRESS NOTES
Subjective     Chief Complaint   Patient presents with   • Tremors       Neela Awad is a 73 y.o. female right handed. she is by herself. She ambulates independently. Patient is here today for follow up for essential tremor and bilateral hand numbness, left more than right. She was last seen 11/18. She states tremor is about the same, does not interfere with ADLs. She notices more tremor with fine motor when she is painting arts/crafts. She does at times notice mild head and voice tremor. She feels like hand numbness has improved. Will occasionally have numbness in left hand when driving.  She has no new complaints. She has hx of anemia, HTN, dyslipidemia, SCC.     Neurologic Problem   The patient's pertinent negatives include no weakness. Primary symptoms comment: tremors. This is a chronic problem. The current episode started more than 1 year ago. The neurological problem developed gradually. The problem is unchanged. Pertinent negatives include no confusion, dizziness, fatigue, nausea or vomiting. (There is change in handwriting, no difficulty holding a cup in hand, no change if rushed or in a hurry, does not drink alcohol so uncertain if would effect tremor) Past treatments include nothing. Improvement on treatment: no family hx of tremors. (No family hx of tremors)   Upper Extremity Issue   This is a chronic (bilateral hand numbness left more than right) problem. The current episode started more than 1 year ago (2017). The problem has been gradually worsening. Associated symptoms include numbness (both hands). Pertinent negatives include no arthralgias (left knee pain), fatigue, nausea, vomiting or weakness. Associated symptoms comments: More hand numbness when driving, notice when sitting and is usually resting elbows on objects. Does not drop things. Can shake her hands makes feel better.     . Exacerbated by: patient does repetive movements at the restaurant. She has tried nothing for the symptoms. The  treatment provided no relief.        Current Outpatient Medications   Medication Sig Dispense Refill   • aspirin 81 MG EC tablet Take 81 mg by mouth Daily.     • calcium carbonate (OS-EMILIANA) 600 MG tablet Take 600 mg by mouth Daily.     • colchicine-probenecid (COL-BENEMID) 0.5-500 MG tablet Take 1 tablet by mouth Daily.     • ferrous sulfate 325 (65 FE) MG tablet Take 325 mg by mouth Daily With Breakfast.     • Garlic 1000 MG capsule Take  by mouth Daily.     • hydrochlorothiazide (HYDRODIURIL) 12.5 MG tablet Take 12.5 mg by mouth Daily.     • meclizine (ANTIVERT) 25 MG tablet Take 25 mg by mouth 3 (Three) Times a Day As Needed for dizziness.     • metoprolol succinate XL (TOPROL-XL) 25 MG 24 hr tablet Take 25 mg by mouth Daily.     • niacin (NIASPAN) 500 MG CR tablet Take 500 mg by mouth Every Night.     • NIFEdipine XL (PROCARDIA XL) 60 MG 24 hr tablet Take 60 mg by mouth Daily.     • quinapril (ACCUPRIL) 20 MG tablet Take 20 mg by mouth Every Night.     • simvastatin (ZOCOR) 40 MG tablet Take 40 mg by mouth As Needed.     • vitamin B-12 (CYANOCOBALAMIN) 500 MCG tablet Take 500 mcg by mouth 1 (One) Time Per Week.       No current facility-administered medications for this visit.        Past Medical History:   Diagnosis Date   • Anemia    • Hyperlipidemia    • Hypertension    • SCC (squamous cell carcinoma), scalp/neck     right neck       Past Surgical History:   Procedure Laterality Date   • COLONOSCOPY     • HYSTERECTOMY     • KNEE SURGERY         family history includes Alcohol abuse in her other; Diabetes in her other; Heart disease in her other; Hypertension in her other; Kidney disease in her other; Obesity in her other.    Social History     Tobacco Use   • Smoking status: Never Smoker   • Smokeless tobacco: Never Used   Substance Use Topics   • Alcohol use: No   • Drug use: No       Review of Systems   Constitutional: Negative.  Negative for fatigue.   HENT: Negative.  Negative for postnasal drip and sinus  "pressure.    Eyes: Negative.  Negative for visual disturbance.   Respiratory: Negative.  Negative for apnea and choking.    Cardiovascular: Positive for leg swelling.   Gastrointestinal: Negative.  Negative for blood in stool, constipation, diarrhea, nausea and vomiting.   Endocrine: Negative.  Negative for cold intolerance and heat intolerance.   Genitourinary: Negative.  Negative for dysuria and frequency.   Musculoskeletal: Negative for arthralgias (left knee pain) and gait problem.   Skin: Negative.    Allergic/Immunologic: Negative.    Neurological: Positive for tremors and numbness (both hands). Negative for dizziness and weakness.   Hematological: Negative.    Psychiatric/Behavioral: Negative.  Negative for agitation, confusion and hallucinations.   All other systems reviewed and are negative.      Objective     /78   Pulse 62   Ht 154.9 cm (61\")   Wt 64 kg (141 lb)   Breastfeeding? No   BMI 26.64 kg/m² , Body mass index is 26.64 kg/m².    Physical Exam   Constitutional: She is oriented to person, place, and time. Vital signs are normal. She appears well-developed and well-nourished. She is cooperative.   HENT:   Head: Normocephalic and atraumatic.   Right Ear: Hearing and external ear normal.   Left Ear: Hearing and external ear normal.   Nose: Nose normal.   Mouth/Throat: Oropharynx is clear and moist.   Eyes: Conjunctivae, EOM and lids are normal. Pupils are equal, round, and reactive to light. Right eye exhibits normal extraocular motion and no nystagmus. Left eye exhibits normal extraocular motion and no nystagmus. Right pupil is round and reactive. Left pupil is round and reactive. Pupils are equal.   Neck: Normal range of motion. Neck supple. Carotid bruit is not present.   Cardiovascular: Normal rate, regular rhythm and normal heart sounds.   No murmur heard.  Pulmonary/Chest: Effort normal and breath sounds normal. She has no decreased breath sounds. She has no wheezes. She has no rhonchi. " She has no rales.   Abdominal: Soft. Bowel sounds are normal.   Musculoskeletal: Normal range of motion.   Neurological: She is alert and oriented to person, place, and time. She has normal strength and normal reflexes. She displays tremor (intermittent horizontal head tremor, no tongue tremor, does have tremor with intention. see line/spiral test). No cranial nerve deficit or sensory deficit. She exhibits normal muscle tone. She displays a negative Romberg sign. Coordination (no ataxia, FTN, HTS intact bilateral) and gait normal.   Reflex Scores:       Tricep reflexes are 2+ on the right side and 2+ on the left side.       Bicep reflexes are 2+ on the right side and 2+ on the left side.       Brachioradialis reflexes are 2+ on the right side and 2+ on the left side.       Patellar reflexes are 2+ on the right side and 2+ on the left side.       Achilles reflexes are 2+ on the right side and 2+ on the left side.  Awake, alert. No aphasia, no dysarthria  Completes simple and complex commands    CN II:  Visual fields full.  Pupils equally reactive to light  CN III, IV, VI:  Extraocular Muscles full with no signs of nystagmus  CN V:  Facial sensory is symmetric with no asymetries.  CN VII:  Facial motor symmetric  CN VIII:  Gross hearing intact bilaterally  CN IX:  Palate elevates symmetrically  CN X:  Palate elevates symmetrically  CN XI:  Shoulder shrug symmetric  CN XII:  Tongue is midline on protrusion    Full and symmetric strength bilateral upper and lower extremities.   Skin: Skin is warm and dry.   Psychiatric: She has a normal mood and affect. Her speech is normal and behavior is normal. Cognition and memory are normal.   Nursing note and vitals reviewed.           ASSESSMENT/PLAN    Diagnoses and all orders for this visit:    Essential tremor    Bilateral carpal tunnel syndrome    MEDICAL DECISION MAKIN. Continue with toprol XL for dual therapy for cardiac reasons and essential tremor. Patient states  tremor mild and not interested in starting other medications at this time.   2. Conservative treatment for CTS.  3. Essential tremor is mild at this point and not interfering with ADLs or daily activities. Will monitor.  4. Patient's Body mass index is 26.64 kg/m². BMI is above normal parameters. Recommendations include: educational material.    HPI and ROS reviewed and updated.      allergies and all known medications/prescriptions have been reviewed using resources available on this encounter.    Return in about 1 year (around 5/20/2020).        Marlene Al, JANE

## 2019-06-10 ENCOUNTER — OFFICE VISIT (OUTPATIENT)
Dept: OTOLARYNGOLOGY | Facility: CLINIC | Age: 74
End: 2019-06-10

## 2019-06-10 VITALS
RESPIRATION RATE: 18 BRPM | TEMPERATURE: 98 F | WEIGHT: 139 LBS | BODY MASS INDEX: 26.24 KG/M2 | SYSTOLIC BLOOD PRESSURE: 150 MMHG | HEART RATE: 57 BPM | DIASTOLIC BLOOD PRESSURE: 100 MMHG | HEIGHT: 61 IN | OXYGEN SATURATION: 97 %

## 2019-06-10 DIAGNOSIS — E04.2 NONTOXIC MULTINODULAR GOITER: Primary | ICD-10-CM

## 2019-06-10 DIAGNOSIS — E04.1 THYROID NODULE: ICD-10-CM

## 2019-06-10 PROCEDURE — 99212 OFFICE O/P EST SF 10 MIN: CPT | Performed by: OTOLARYNGOLOGY

## 2019-06-10 RX ORDER — PANTOPRAZOLE SODIUM 40 MG/1
40 TABLET, DELAYED RELEASE ORAL DAILY PRN
COMMUNITY
End: 2020-05-11

## 2019-06-10 NOTE — PROGRESS NOTES
Michael Loyola MD   Chief complaint : Follow up thyroid problems       History of Present Illness:  Neela Awad is a 73 y.o. female who presents for follow up of thyroid problems. She has had no current complaints.  The patient has not had complaints of : dysphagia, neck tightness, a visable thyroid enlargement or a visable thyroid nodule.  She did not get her follow-up thyroid ultrasound.    Review of Systems:  Reviewed as per patient intake note.    Past History:  Past medical and surgical history, family history and social history reviewed and updated when appropriate.  Current medications and allergies reviewed and updated when appropriate.  Allergies:  Allopurinol            Vital Signs    Temp:  [98 °F (36.7 °C)] 98 °F (36.7 °C)  Heart Rate:  [57] 57  Resp:  [18] 18  BP: (150)/(100) 150/100    Physical Exam:  CONSTITUTIONAL: well nourished, well-developed, alert, oriented, in no acute distress   COMMUNICATION AND VOICE: able to communicate normally, normal voice quality  HEAD: normocephalic, no lesions, atraumatic, no tenderness, no masses   FACE: appearance normal, no lesions, no tenderness, no deformities, facial motion symmetric  EYES: ocular motility normal, eyelids normal, orbits normal, no proptosis, conjunctiva normal , pupils equal, round   EARS:  Hearing: hearing to conversational voice intact bilaterally   External Ears: normal bilaterally, no lesions  NOSE:  External Nose: external nasal structure normal, no tenderness on palpation, no nasal discharge, no lesions, no evidence of trauma, nostrils patent   ORAL:  Lips: upper and lower lips without lesion   NECK:  Inspection and Palpation: neck appearance normal, no masses or tenderness  THYROID: non-tender, no mass, no thyromegaly,  CHEST/RESPIRATORY: normal respiratory effort   CARDIOVASCULAR: no cyanosis or edema   NEUROLOGICAL/PSYCHIATRIC: oriented to time, place and person, mood normal, affect appropriate, CN II-XII intact  grossly    RESULTS REVIEW :           Assessment       1. Nontoxic multinodular goiter    2. Thyroid nodule          Plan   Orders Placed This Encounter   Procedures   • US Thyroid   • US Head Neck Soft Tissue     We will go ahead and get a thyroid ultrasound now and assume that it is stable.  If so, we will see her back in a year with another ultrasound.  If there is findings that are different from the previous one, we will get her back sooner to go over the results.    ----INSTRUCTIONS----  Call for sooner evaluation if increasing size of the thyroid or nodules, increasing dysphagia, lymphadenopathy, neck tightness or other thyroid problems.     Return in about 1 year (around 6/10/2020).      Michael Loyola MD  06/10/19  11:47 AM

## 2019-06-10 NOTE — PROGRESS NOTES
Tianna Mcnair MA   Patient Intake Note    Review of Systems  Review of Systems   Constitutional: Negative for chills, fatigue and fever.   HENT:        See HPI   Eyes: Positive for itching. Negative for pain, discharge and redness.   Respiratory: Positive for cough and choking. Negative for shortness of breath.    Gastrointestinal: Negative for diarrhea, nausea and vomiting.   Musculoskeletal: Negative for neck pain and neck stiffness.   Neurological: Negative for dizziness, weakness, light-headedness and headaches.   Psychiatric/Behavioral: Positive for sleep disturbance.   All other systems reviewed and are negative.      QUALITY MEASURES    Tobacco Use: Screening and Cessation Intervention  Social History    Tobacco Use      Smoking status: Never Smoker      Smokeless tobacco: Never Used        Tianna Mcnair MA  6/10/2019  11:29 AM

## 2019-06-14 ENCOUNTER — HOSPITAL ENCOUNTER (OUTPATIENT)
Dept: ULTRASOUND IMAGING | Facility: HOSPITAL | Age: 74
Discharge: HOME | End: 2019-06-14
Admitting: OTOLARYNGOLOGY

## 2019-06-14 PROCEDURE — 76536 US EXAM OF HEAD AND NECK: CPT

## 2019-06-17 ENCOUNTER — TELEPHONE (OUTPATIENT)
Dept: OTOLARYNGOLOGY | Facility: CLINIC | Age: 74
End: 2019-06-17

## 2019-06-17 NOTE — TELEPHONE ENCOUNTER
----- Message from Michael Loyola MD sent at 6/16/2019  9:52 PM CDT -----  Looks pretty stable, one new nodule but the other nodules appear stable. I would follow the thyroid and recheck in 1 year    6/17/19 Patient notified

## 2020-03-14 ENCOUNTER — APPOINTMENT (OUTPATIENT)
Dept: GENERAL RADIOLOGY | Facility: HOSPITAL | Age: 75
End: 2020-03-14

## 2020-03-14 ENCOUNTER — APPOINTMENT (OUTPATIENT)
Dept: CT IMAGING | Facility: HOSPITAL | Age: 75
End: 2020-03-14

## 2020-03-14 ENCOUNTER — HOSPITAL ENCOUNTER (EMERGENCY)
Facility: HOSPITAL | Age: 75
Discharge: HOME | End: 2020-03-14
Admitting: NURSE PRACTITIONER

## 2020-03-14 VITALS
OXYGEN SATURATION: 99 % | TEMPERATURE: 98 F | WEIGHT: 144 LBS | SYSTOLIC BLOOD PRESSURE: 161 MMHG | HEIGHT: 61 IN | BODY MASS INDEX: 27.19 KG/M2 | HEART RATE: 65 BPM | DIASTOLIC BLOOD PRESSURE: 71 MMHG | RESPIRATION RATE: 16 BRPM

## 2020-03-14 DIAGNOSIS — K44.9 HIATAL HERNIA: ICD-10-CM

## 2020-03-14 DIAGNOSIS — R59.9 ENLARGED LYMPH NODE: ICD-10-CM

## 2020-03-14 DIAGNOSIS — N28.89 KIDNEY MASS: ICD-10-CM

## 2020-03-14 DIAGNOSIS — M54.6 ACUTE THORACIC BACK PAIN, UNSPECIFIED BACK PAIN LATERALITY: ICD-10-CM

## 2020-03-14 DIAGNOSIS — E04.1 THYROID NODULE: Primary | ICD-10-CM

## 2020-03-14 LAB
ALBUMIN SERPL-MCNC: 4.4 G/DL (ref 3.5–5.2)
ALBUMIN/GLOB SERPL: 1.3 G/DL
ALP SERPL-CCNC: 60 U/L (ref 39–117)
ALT SERPL W P-5'-P-CCNC: 17 U/L (ref 1–33)
ANION GAP SERPL CALCULATED.3IONS-SCNC: 13 MMOL/L (ref 5–15)
APTT PPP: 34 SECONDS (ref 24.1–35)
AST SERPL-CCNC: 21 U/L (ref 1–32)
BACTERIA UR QL AUTO: ABNORMAL /HPF
BASOPHILS # BLD AUTO: 0.04 10*3/MM3 (ref 0–0.2)
BASOPHILS NFR BLD AUTO: 0.6 % (ref 0–1.5)
BILIRUB SERPL-MCNC: <0.2 MG/DL (ref 0.2–1.2)
BILIRUB UR QL STRIP: NEGATIVE
BUN BLD-MCNC: 18 MG/DL (ref 8–23)
BUN/CREAT SERPL: 23.7 (ref 7–25)
CALCIUM SPEC-SCNC: 9.7 MG/DL (ref 8.6–10.5)
CHLORIDE SERPL-SCNC: 95 MMOL/L (ref 98–107)
CLARITY UR: CLEAR
CO2 SERPL-SCNC: 29 MMOL/L (ref 22–29)
COLOR UR: YELLOW
CREAT BLD-MCNC: 0.76 MG/DL (ref 0.57–1)
D DIMER PPP FEU-MCNC: 0.51 MG/L (FEU) (ref 0–0.5)
DEPRECATED RDW RBC AUTO: 40.4 FL (ref 37–54)
EOSINOPHIL # BLD AUTO: 0.24 10*3/MM3 (ref 0–0.4)
EOSINOPHIL NFR BLD AUTO: 3.8 % (ref 0.3–6.2)
ERYTHROCYTE [DISTWIDTH] IN BLOOD BY AUTOMATED COUNT: 12.9 % (ref 12.3–15.4)
GFR SERPL CREATININE-BSD FRML MDRD: 74 ML/MIN/1.73
GLOBULIN UR ELPH-MCNC: 3.4 GM/DL
GLUCOSE BLD-MCNC: 91 MG/DL (ref 65–99)
GLUCOSE UR STRIP-MCNC: NEGATIVE MG/DL
HCT VFR BLD AUTO: 38.2 % (ref 34–46.6)
HGB BLD-MCNC: 13.2 G/DL (ref 12–15.9)
HGB UR QL STRIP.AUTO: NEGATIVE
HYALINE CASTS UR QL AUTO: ABNORMAL /LPF
IMM GRANULOCYTES # BLD AUTO: 0.02 10*3/MM3 (ref 0–0.05)
IMM GRANULOCYTES NFR BLD AUTO: 0.3 % (ref 0–0.5)
INR PPP: 0.91 (ref 0.91–1.09)
KETONES UR QL STRIP: NEGATIVE
LEUKOCYTE ESTERASE UR QL STRIP.AUTO: ABNORMAL
LIPASE SERPL-CCNC: 32 U/L (ref 13–60)
LYMPHOCYTES # BLD AUTO: 1.34 10*3/MM3 (ref 0.7–3.1)
LYMPHOCYTES NFR BLD AUTO: 21 % (ref 19.6–45.3)
MCH RBC QN AUTO: 29.7 PG (ref 26.6–33)
MCHC RBC AUTO-ENTMCNC: 34.6 G/DL (ref 31.5–35.7)
MCV RBC AUTO: 86 FL (ref 79–97)
MONOCYTES # BLD AUTO: 0.75 10*3/MM3 (ref 0.1–0.9)
MONOCYTES NFR BLD AUTO: 11.8 % (ref 5–12)
NEUTROPHILS # BLD AUTO: 3.99 10*3/MM3 (ref 1.7–7)
NEUTROPHILS NFR BLD AUTO: 62.5 % (ref 42.7–76)
NITRITE UR QL STRIP: NEGATIVE
NRBC BLD AUTO-RTO: 0 /100 WBC (ref 0–0.2)
NT-PROBNP SERPL-MCNC: 332.9 PG/ML (ref 5–1800)
PH UR STRIP.AUTO: 8 [PH] (ref 5–8)
PLATELET # BLD AUTO: 228 10*3/MM3 (ref 140–450)
PMV BLD AUTO: 10.5 FL (ref 6–12)
POTASSIUM BLD-SCNC: 3.6 MMOL/L (ref 3.5–5.2)
PROT SERPL-MCNC: 7.8 G/DL (ref 6–8.5)
PROT UR QL STRIP: NEGATIVE
PROTHROMBIN TIME: 12.2 SECONDS (ref 11.9–14.6)
RBC # BLD AUTO: 4.44 10*6/MM3 (ref 3.77–5.28)
RBC # UR: ABNORMAL /HPF
REF LAB TEST METHOD: ABNORMAL
SODIUM BLD-SCNC: 137 MMOL/L (ref 136–145)
SP GR UR STRIP: <=1.005 (ref 1–1.03)
SQUAMOUS #/AREA URNS HPF: ABNORMAL /HPF
TROPONIN T SERPL-MCNC: <0.01 NG/ML (ref 0–0.03)
UROBILINOGEN UR QL STRIP: ABNORMAL
WBC NRBC COR # BLD: 6.38 10*3/MM3 (ref 3.4–10.8)
WBC UR QL AUTO: ABNORMAL /HPF

## 2020-03-14 PROCEDURE — 25010000002 IOPAMIDOL 61 % SOLUTION: Performed by: NURSE PRACTITIONER

## 2020-03-14 PROCEDURE — 93005 ELECTROCARDIOGRAM TRACING: CPT | Performed by: NURSE PRACTITIONER

## 2020-03-14 PROCEDURE — 85379 FIBRIN DEGRADATION QUANT: CPT | Performed by: NURSE PRACTITIONER

## 2020-03-14 PROCEDURE — 71045 X-RAY EXAM CHEST 1 VIEW: CPT

## 2020-03-14 PROCEDURE — 84484 ASSAY OF TROPONIN QUANT: CPT | Performed by: NURSE PRACTITIONER

## 2020-03-14 PROCEDURE — 85025 COMPLETE CBC W/AUTO DIFF WBC: CPT | Performed by: NURSE PRACTITIONER

## 2020-03-14 PROCEDURE — 85730 THROMBOPLASTIN TIME PARTIAL: CPT | Performed by: NURSE PRACTITIONER

## 2020-03-14 PROCEDURE — 83880 ASSAY OF NATRIURETIC PEPTIDE: CPT | Performed by: NURSE PRACTITIONER

## 2020-03-14 PROCEDURE — 71275 CT ANGIOGRAPHY CHEST: CPT

## 2020-03-14 PROCEDURE — 96374 THER/PROPH/DIAG INJ IV PUSH: CPT

## 2020-03-14 PROCEDURE — 83690 ASSAY OF LIPASE: CPT | Performed by: NURSE PRACTITIONER

## 2020-03-14 PROCEDURE — 80053 COMPREHEN METABOLIC PANEL: CPT | Performed by: NURSE PRACTITIONER

## 2020-03-14 PROCEDURE — 93010 ELECTROCARDIOGRAM REPORT: CPT | Performed by: INTERNAL MEDICINE

## 2020-03-14 PROCEDURE — 25010000002 HYDRALAZINE PER 20 MG: Performed by: NURSE PRACTITIONER

## 2020-03-14 PROCEDURE — 74177 CT ABD & PELVIS W/CONTRAST: CPT

## 2020-03-14 PROCEDURE — 85610 PROTHROMBIN TIME: CPT | Performed by: NURSE PRACTITIONER

## 2020-03-14 PROCEDURE — 99284 EMERGENCY DEPT VISIT MOD MDM: CPT

## 2020-03-14 PROCEDURE — 81001 URINALYSIS AUTO W/SCOPE: CPT | Performed by: NURSE PRACTITIONER

## 2020-03-14 PROCEDURE — 99283 EMERGENCY DEPT VISIT LOW MDM: CPT

## 2020-03-14 RX ORDER — SODIUM CHLORIDE 0.9 % (FLUSH) 0.9 %
10 SYRINGE (ML) INJECTION AS NEEDED
Status: DISCONTINUED | OUTPATIENT
Start: 2020-03-14 | End: 2020-03-14 | Stop reason: HOSPADM

## 2020-03-14 RX ORDER — HYDROCODONE BITARTRATE AND ACETAMINOPHEN 5; 325 MG/1; MG/1
1 TABLET ORAL EVERY 6 HOURS PRN
Qty: 6 TABLET | Refills: 0 | Status: SHIPPED | OUTPATIENT
Start: 2020-03-14 | End: 2020-03-16

## 2020-03-14 RX ORDER — HYDRALAZINE HYDROCHLORIDE 20 MG/ML
10 INJECTION INTRAMUSCULAR; INTRAVENOUS ONCE
Status: COMPLETED | OUTPATIENT
Start: 2020-03-14 | End: 2020-03-14

## 2020-03-14 RX ADMIN — HYDRALAZINE HYDROCHLORIDE 10 MG: 20 INJECTION INTRAMUSCULAR; INTRAVENOUS at 19:43

## 2020-03-14 RX ADMIN — IOPAMIDOL 100 ML: 612 INJECTION, SOLUTION INTRAVENOUS at 20:00

## 2020-03-15 NOTE — ED PROVIDER NOTES
Subjective   Patient is a 74-year-old female that presents to the ER today with complaint of left mid back pain.  The patient states that she has had this pain for approximately 2 weeks.  She states that the pain began in her left upper quadrant and is now only in the left mid back.  She states that she was told by some friends that it could be her gallbladder.  The patient states that today she went to a retreat and had a massage.  She states that the pain did seem to become worse however does seem to be a little bit better since that.  She has taken aspirin and Tylenol at home and states that this has helped her pain.  The patient reports that she has had no chest pain or shortness of breath.  She denies any abdominal pain or nausea vomiting currently.  She denies any dysuria.  The patient states that she was seen at [facility] and they were concerned that she could have a kidney stone.  She states that they did a urine test on her and that this was normal.  She states that this was several days ago.  The patient states that she also saw her cardiologist when this pain began.  She states that she was told that everything was fine with her heart.  The patient states that this is the pain continued she wanted to come to the ER for further evaluation.  She presents with her daughter today.      History provided by:  Patient   used: No    Back Pain   Location:  Thoracic spine  Quality:  Aching and burning  Radiates to:  Does not radiate  Pain severity:  Moderate  Pain is:  Unable to specify  Onset quality:  Sudden  Duration:  2 weeks  Timing:  Constant  Progression:  Worsening  Chronicity:  New  Context: not emotional stress, not falling, not jumping from heights, not lifting heavy objects, not MCA, not MVA, not occupational injury, not pedestrian accident, not physical stress, not recent illness, not recent injury and not twisting    Relieved by:  Nothing  Worsened by:   Nothing  Ineffective treatments:  None tried  Associated symptoms: abdominal pain    Associated symptoms: no abdominal swelling, no bladder incontinence, no bowel incontinence, no chest pain, no dysuria, no fever, no headaches, no leg pain, no numbness, no paresthesias, no pelvic pain, no perianal numbness, no tingling, no weakness and no weight loss    Risk factors: no hx of cancer, no hx of osteoporosis, no lack of exercise, no menopause, not obese, not pregnant, no recent surgery, no steroid use and no vascular disease        Review of Systems   Constitutional: Negative for fever and weight loss.   Cardiovascular: Negative for chest pain.   Gastrointestinal: Positive for abdominal pain. Negative for bowel incontinence.   Genitourinary: Negative for bladder incontinence, dysuria and pelvic pain.   Musculoskeletal: Positive for back pain.   Neurological: Negative for tingling, weakness, numbness, headaches and paresthesias.   All other systems reviewed and are negative.      Past Medical History:   Diagnosis Date   • Anemia    • Hyperlipidemia    • Hypertension    • SCC (squamous cell carcinoma), scalp/neck     right neck       Allergies   Allergen Reactions   • Allopurinol Other (See Comments)     Elevates blood pressure.       Past Surgical History:   Procedure Laterality Date   • COLONOSCOPY     • HYSTERECTOMY     • KNEE SURGERY         Family History   Problem Relation Age of Onset   • Heart disease Other    • Diabetes Other    • Alcohol abuse Other    • Hypertension Other    • Obesity Other    • Kidney disease Other        Social History     Socioeconomic History   • Marital status: Legally      Spouse name: Not on file   • Number of children: Not on file   • Years of education: Not on file   • Highest education level: Not on file   Tobacco Use   • Smoking status: Never Smoker   • Smokeless tobacco: Never Used   Substance and Sexual Activity   • Alcohol use: No   • Drug use: No           Objective    Physical Exam   Constitutional: She is oriented to person, place, and time. She appears well-developed and well-nourished.   HENT:   Head: Normocephalic and atraumatic.   Eyes: Conjunctivae are normal.   Cardiovascular: Normal rate, regular rhythm and normal heart sounds.   Pulmonary/Chest: Effort normal and breath sounds normal.   Abdominal: Soft. Bowel sounds are normal.   Musculoskeletal:        Arms:  Neurological: She is alert and oriented to person, place, and time.   Skin: Skin is warm and dry. Capillary refill takes less than 2 seconds.   Psychiatric: She has a normal mood and affect.   Nursing note and vitals reviewed.      Procedures           ED Course  ED Course as of Mar 23 0744   Mon Mar 23, 2020   0742 The patient's pressure was elevated.  She states that her blood pressure normally remains elevated and she was given hydralazine for this.  It did improve after that.  Her labs showed a normal white blood cell count.  Normal troponin.  EKG showed no acute changes.  Her d-dimer was elevated and a CTA chest was ordered.    [LF]   0743 Patient was advised of the hiatal hernia and the lymph node noted in the chest CT.  She was advised of need to follow-up for this.    [LF]   0743 Patient was advised of the mass in the right kidney and that she will need to follow-up with primary care provider for further evaluation of this.  At this time she will be discharged home with her daughter in stable condition.  I will give her a short course of Norco.  Billy report showed no suspicious findings.  Patient advised return the ER for any new or worsening symptoms.  We discharged home in stable condition.    [LF]      ED Course User Index  [LF] Lilliam Mckenzie, APRN                                 CT Angiogram Chest   Final Result   1. No evidence of embolic disease.           2. Large hiatal hernia.           3. Prominent lymph node in the right hilum however this contains   calcification and is probably a  benign granuloma   4. Thyroid nodules present however this has been biopsied.           This report was finalized on 03/14/2020 20:42 by Dr. Dandre Archuleta MD.      CT Abdomen Pelvis With Contrast   Final Result   1. 42 mm mass lower pole of the right kidney. This may be a   proteinaceous or hemorrhagic cyst however neoplasm cannot be excluded.   2. A few normal-sized mesenteric lymph nodes are present which is   nonspecific.   3. Large hiatal hernia           This report was finalized on 03/14/2020 20:51 by Dr. Dandre Archuleta MD.      XR Chest 1 View   Final Result   1. No acute cardiopulmonary process. Moderate to large hiatal hernia.           This report was finalized on 03/14/2020 19:09 by Dr. Dandre Archuleta MD.        Labs Reviewed   COMPREHENSIVE METABOLIC PANEL - Abnormal; Notable for the following components:       Result Value    Chloride 95 (*)     Total Bilirubin <0.2 (*)     All other components within normal limits    Narrative:     GFR Normal >60  Chronic Kidney Disease <60  Kidney Failure <15     URINALYSIS W/ CULTURE IF INDICATED - Abnormal; Notable for the following components:    Leuk Esterase, UA Trace (*)     All other components within normal limits   D-DIMER, QUANTITATIVE - Abnormal; Notable for the following components:    D-Dimer, Quantitative 0.51 (*)     All other components within normal limits    Narrative:     Reference Range is 0-0.50 mg/L FEU. However, results <0.50 mg/L FEU tends to rule out DVT or PE. Results >0.50 mg/L FEU are not useful in predicting absence or presence of DVT or PE.     URINALYSIS, MICROSCOPIC ONLY - Abnormal; Notable for the following components:    RBC, UA 0-2 (*)     WBC, UA 0-2 (*)     All other components within normal limits   PROTIME-INR - Normal   APTT - Normal   LIPASE - Normal   TROPONIN (IN-HOUSE) - Normal    Narrative:     Troponin T Reference Range:  <= 0.03 ng/mL-   Negative for AMI  >0.03 ng/mL-     Abnormal for myocardial necrosis.  Clinicians  would have to utilize clinical acumen, EKG, Troponin and serial changes to determine if it is an Acute Myocardial Infarction or myocardial injury due to an underlying chronic condition.       Results may be falsely decreased if patient taking Biotin.     BNP (IN-HOUSE) - Normal    Narrative:     Among patients with dyspnea, NT-proBNP is highly sensitive for the detection of acute congestive heart failure. In addition NT-proBNP of <300 pg/ml effectively rules out acute congestive heart failure with 99% negative predictive value.    Results may be falsely decreased if patient taking Biotin.     CBC WITH AUTO DIFFERENTIAL - Normal   CBC AND DIFFERENTIAL    Narrative:     The following orders were created for panel order CBC & Differential.  Procedure                               Abnormality         Status                     ---------                               -----------         ------                     CBC Auto Differential[[id number]]        Normal              Final result                 Please view results for these tests on the individual orders.               MDM  Number of Diagnoses or Management Options  Acute thoracic back pain, unspecified back pain laterality: new and requires workup  Enlarged lymph node: new and requires workup  Hiatal hernia: new and requires workup  Kidney mass: new and requires workup  Thyroid nodule: new and requires workup     Amount and/or Complexity of Data Reviewed  Clinical lab tests: ordered and reviewed  Tests in the radiology section of CPT®: ordered and reviewed  Tests in the medicine section of CPT®: ordered and reviewed  Discuss the patient with other providers: yes    Patient Progress  Patient progress: stable      Final diagnoses:   Thyroid nodule   Hiatal hernia   Enlarged lymph node   Kidney mass   Acute thoracic back pain, unspecified back pain laterality            Lilliam Mckenzie, APRN  03/23/20 0744

## 2020-03-19 ENCOUNTER — OFFICE VISIT (OUTPATIENT)
Dept: UROLOGY | Facility: CLINIC | Age: 75
End: 2020-03-19

## 2020-03-19 VITALS
RESPIRATION RATE: 17 BRPM | WEIGHT: 144 LBS | BODY MASS INDEX: 27.19 KG/M2 | SYSTOLIC BLOOD PRESSURE: 144 MMHG | DIASTOLIC BLOOD PRESSURE: 62 MMHG | TEMPERATURE: 98.7 F | HEIGHT: 61 IN | OXYGEN SATURATION: 99 % | HEART RATE: 67 BPM

## 2020-03-19 DIAGNOSIS — N28.89 RIGHT RENAL MASS: Primary | ICD-10-CM

## 2020-03-19 LAB
BILIRUB BLD-MCNC: NEGATIVE MG/DL
CLARITY, POC: CLEAR
COLOR UR: YELLOW
GLUCOSE UR STRIP-MCNC: NEGATIVE MG/DL
KETONES UR QL: NEGATIVE
LEUKOCYTE EST, POC: ABNORMAL
NITRITE UR-MCNC: NEGATIVE MG/ML
PH UR: 6 [PH] (ref 5–8)
PROT UR STRIP-MCNC: ABNORMAL MG/DL
RBC # UR STRIP: NEGATIVE /UL
SP GR UR: 1.02 (ref 1–1.03)
UROBILINOGEN UR QL: NORMAL

## 2020-03-19 PROCEDURE — 81003 URINALYSIS AUTO W/O SCOPE: CPT | Performed by: UROLOGY

## 2020-03-19 PROCEDURE — 99204 OFFICE O/P NEW MOD 45 MIN: CPT | Performed by: UROLOGY

## 2020-03-19 RX ORDER — OMEPRAZOLE 40 MG/1
40 CAPSULE, DELAYED RELEASE ORAL DAILY
COMMUNITY
Start: 2020-03-18 | End: 2020-05-11

## 2020-03-19 NOTE — PROGRESS NOTES
Ms. Awad is 74 y.o. female    CHIEF COMPLAINT: I am here for a renal mass    HPI  She has a right renal mass. It was identified 3/14/2020 as incidental CT finding. This was identified in context of evaluating left upper quadrant pain. She has no hematuria, right flank pain or weight loss.     The following portions of the patient's history were reviewed and updated as appropriate: allergies, current medications, past family history, past medical history, past social history, past surgical history and problem list.      Review of Systems   Constitutional: Negative for appetite change, diaphoresis and fever.   HENT: Negative for facial swelling and sore throat.    Eyes: Negative for discharge and visual disturbance.   Respiratory: Negative for cough and shortness of breath.    Cardiovascular: Negative for chest pain and leg swelling.   Gastrointestinal: Negative for anal bleeding and vomiting.   Endocrine: Negative for cold intolerance and heat intolerance.   Genitourinary: Negative for flank pain, hematuria and pelvic pain.   Musculoskeletal: Negative for back pain and gait problem.   Skin: Negative for pallor and rash.   Allergic/Immunologic: Negative for immunocompromised state.   Neurological: Negative for seizures and headaches.   Hematological: Negative for adenopathy. Does not bruise/bleed easily.   Psychiatric/Behavioral: Negative for dysphoric mood, self-injury and suicidal ideas.         Current Outpatient Medications:   •  omeprazole (priLOSEC) 40 MG capsule, Take 40 mg by mouth Daily., Disp: , Rfl:   •  aspirin 81 MG EC tablet, Take 81 mg by mouth Daily., Disp: , Rfl:   •  calcium carbonate (OS-EMILIANA) 600 MG tablet, Take 600 mg by mouth Daily., Disp: , Rfl:   •  colchicine-probenecid (COL-BENEMID) 0.5-500 MG tablet, Take 1 tablet by mouth Daily., Disp: , Rfl:   •  ferrous sulfate 325 (65 FE) MG tablet, Take 325 mg by mouth Daily With Breakfast., Disp: , Rfl:   •  Garlic 1000 MG capsule, Take  by mouth  "Daily., Disp: , Rfl:   •  hydrochlorothiazide (HYDRODIURIL) 12.5 MG tablet, Take 12.5 mg by mouth Daily., Disp: , Rfl:   •  meclizine (ANTIVERT) 25 MG tablet, Take 25 mg by mouth 3 (Three) Times a Day As Needed for dizziness., Disp: , Rfl:   •  metoprolol succinate XL (TOPROL-XL) 25 MG 24 hr tablet, Take 25 mg by mouth Daily., Disp: , Rfl:   •  niacin (NIASPAN) 500 MG CR tablet, Take 500 mg by mouth Every Night., Disp: , Rfl:   •  NIFEdipine XL (PROCARDIA XL) 60 MG 24 hr tablet, Take 60 mg by mouth Daily., Disp: , Rfl:   •  pantoprazole (PROTONIX) 40 MG EC tablet, Take 40 mg by mouth Daily As Needed., Disp: , Rfl:   •  quinapril (ACCUPRIL) 20 MG tablet, Take 20 mg by mouth Every Night., Disp: , Rfl:   •  simvastatin (ZOCOR) 40 MG tablet, Take 40 mg by mouth Every Night. Takes 1/2 tablet nightly., Disp: , Rfl:   •  vitamin B-12 (CYANOCOBALAMIN) 500 MCG tablet, Take 500 mcg by mouth 1 (One) Time Per Week., Disp: , Rfl:     Past Medical History:   Diagnosis Date   • Anemia    • Hyperlipidemia    • Hypertension    • SCC (squamous cell carcinoma), scalp/neck     right neck       Past Surgical History:   Procedure Laterality Date   • COLONOSCOPY     • HYSTERECTOMY     • KNEE SURGERY         Social History     Socioeconomic History   • Marital status: Legally      Spouse name: Not on file   • Number of children: Not on file   • Years of education: Not on file   • Highest education level: Not on file   Tobacco Use   • Smoking status: Never Smoker   • Smokeless tobacco: Never Used   Substance and Sexual Activity   • Alcohol use: No   • Drug use: No       Family History   Problem Relation Age of Onset   • Heart disease Other    • Diabetes Other    • Alcohol abuse Other    • Hypertension Other    • Obesity Other    • Kidney disease Other          /62 (BP Location: Left arm, Patient Position: Sitting)   Pulse 67   Temp 98.7 °F (37.1 °C)   Resp 17   Ht 154.9 cm (61\")   Wt 65.3 kg (144 lb)   SpO2 99%   " Breastfeeding No   BMI 27.21 kg/m²       Physical Exam  Constitutional: Well nourished, Well developed; No apparent distress; Vital reviewed as above  Psychiatric: Appropriate affect; Alert and oriented  Eyes: Unremarkable  Musculoskeletal: Normal gait and station  GI: Abdomen is soft, non-tender  Respiratory: No distress; Unlabored movement; No accessory musculature needed with symmetric movements  Skin: No pallor or diaphoresis  Lymphatic: No adenopathy neck or groin        Data  Results for orders placed or performed in visit on 03/19/20   POC Urinalysis Dipstick, Multipro   Result Value Ref Range    Color Yellow Yellow, Straw, Dark Yellow, Kelly    Clarity, UA Clear Clear    Glucose, UA Negative Negative, 1000 mg/dL (3+) mg/dL    Bilirubin Negative Negative    Ketones, UA Negative Negative    Specific Gravity  1.020 1.005 - 1.030    Blood, UA Negative Negative    pH, Urine 6.0 5.0 - 8.0    Protein, POC Trace (A) Negative mg/dL    Urobilinogen, UA Normal Normal    Nitrite, UA Negative Negative    Leukocytes Small (1+) (A) Negative      CT ABDOMEN PELVIS W CONTRAST-  3/14/2020 8:42 PM CDT  HISTORY: Left-sided abdominal pain  COMPARISON: None.   DLP: 491 mGy cm  TECHNIQUE: Following the intravenous administration of contrast, helical  CT tomographic images of the abdomen and pelvis were acquired. Coronal  reformatted images were also provided for review.   FINDINGS:   The lung bases and base of the heart are unremarkable.   LIVER: No focal liver lesion. The hepatic vasculature is patent.   BILIARY SYSTEM: The gallbladder is unremarkable. No intrahepatic or  extrahepatic ductal dilatation.   PANCREAS: No focal pancreatic lesion.   SPLEEN: Unremarkable.      KIDNEYS AND ADRENALS: Adrenal glands are visualized. Left renal contour  appears normal. There is a 4.2 cm nonuniform enhancing mass associated  with the lower pole of the right kidney. This may be a proteinaceous or  hemorrhagic cyst however a neoplasm cannot  be excluded.. The ureters are  decompressed and normal in appearance.     RETROPERITONEUM: No mass, lymphadenopathy or hemorrhage.   GI TRACT: No evidence of obstruction or bowel wall thickening. The  appendix is visualized and unremarkable. Large hiatal hernia is present  OTHER: A few normal-sized mesenteric lymph nodes are present this is  nonspecific. Vascular calcifications present abdominal aorta and iliac  arteries.. Subchondral degenerative cyst is noted in the left femoral  head degenerative change in the facet joints is present L5-S1.  Subchondral degenerative cystic changes present right femoral head.  PELVIS: No mass lesion, fluid collection or significant lymphadenopathy  is seen in the pelvis. The urinary bladder is normal in appearance.     IMPRESSION:  1. 42 mm mass lower pole of the right kidney. This may be a  proteinaceous or hemorrhagic cyst however neoplasm cannot be excluded.  2. A few normal-sized mesenteric lymph nodes are present which is  nonspecific.  3. Large hiatal hernia        This report was finalized on 03/14/2020 20:51 by Dr. Dandre Archuleta MD.    These images were made available to me to review independently.  I did review the radiologist's report described above with regard to the urologic findings.   /Quinton Rainey MD        Assessment and Plan  Neela was seen today for establish care.    Diagnoses and all orders for this visit:    Right renal mass  -     POC Urinalysis Dipstick, Multipro  -     MRI abdomen w wo contrast; Future    -This represents an undiagnosed new problem with uncertain prognosis.  -I am concerned about this mass because the Hounsfield units are greater than 110.  However, this is quite uniform and it is possible that this does not enhance.  My plan would be to get an MRI with and without contrast.  I told her that this likely will need surgery.  I do not think it is good to be reasonable for partial nephrectomy.    F/U: next week with MRI renal mass  protocol      (Please note that portions of this note were completed with a voice recognition program.)  Quinton Rainey MD  03/19/20  16:05

## 2020-03-24 ENCOUNTER — TELEPHONE (OUTPATIENT)
Dept: UROLOGY | Facility: CLINIC | Age: 75
End: 2020-03-24

## 2020-03-24 NOTE — PROGRESS NOTES
Ms. Awad is 74 y.o. female    CHIEF COMPLAINT: I am here for one week follow up with MRI for right renal mass. MRI was done at [facility] 26-March.     HPI  She returns in f/u right renal mass.   Found in context evaluation for left upper quadrant/left back pain  She continues to have pain in left back.   This was first noted two weeks ago.   No hematuria or unexplained weight loss.    The following portions of the patient's history were reviewed and updated as appropriate: allergies, current medications, past family history, past medical history, past social history, past surgical history and problem list.      Review of Systems   Constitutional: Negative for appetite change, diaphoresis and fever.   HENT: Negative for facial swelling and sore throat.    Eyes: Negative for discharge and visual disturbance.   Respiratory: Negative for cough and shortness of breath.    Cardiovascular: Negative for chest pain and leg swelling.   Gastrointestinal: Negative for anal bleeding and vomiting.   Endocrine: Negative for cold intolerance and heat intolerance.   Genitourinary: Positive for flank pain (Left flank pain). Negative for frequency, hematuria, pelvic pain and urgency.   Musculoskeletal: Negative for back pain and gait problem.   Skin: Negative for pallor and rash.   Allergic/Immunologic: Negative for immunocompromised state.   Neurological: Negative for seizures and headaches.   Hematological: Negative for adenopathy. Does not bruise/bleed easily.   Psychiatric/Behavioral: Negative for dysphoric mood, self-injury and suicidal ideas.         Current Outpatient Medications:   •  aspirin 81 MG EC tablet, Take 81 mg by mouth Daily., Disp: , Rfl:   •  calcium carbonate (OS-EMILIANA) 600 MG tablet, Take 600 mg by mouth Daily., Disp: , Rfl:   •  colchicine-probenecid (COL-BENEMID) 0.5-500 MG tablet, Take 1 tablet by mouth Daily., Disp: , Rfl:   •  ferrous sulfate 325 (65 FE) MG tablet, Take 325 mg by mouth Daily With Breakfast.,  Disp: , Rfl:   •  Garlic 1000 MG capsule, Take  by mouth Daily., Disp: , Rfl:   •  hydrochlorothiazide (HYDRODIURIL) 12.5 MG tablet, Take 12.5 mg by mouth Daily., Disp: , Rfl:   •  meclizine (ANTIVERT) 25 MG tablet, Take 25 mg by mouth 3 (Three) Times a Day As Needed for dizziness., Disp: , Rfl:   •  metoprolol succinate XL (TOPROL-XL) 25 MG 24 hr tablet, Take 25 mg by mouth Daily., Disp: , Rfl:   •  niacin (NIASPAN) 500 MG CR tablet, Take 500 mg by mouth Every Night., Disp: , Rfl:   •  NIFEdipine XL (PROCARDIA XL) 60 MG 24 hr tablet, Take 60 mg by mouth Daily., Disp: , Rfl:   •  omeprazole (priLOSEC) 40 MG capsule, Take 40 mg by mouth Daily., Disp: , Rfl:   •  pantoprazole (PROTONIX) 40 MG EC tablet, Take 40 mg by mouth Daily As Needed., Disp: , Rfl:   •  quinapril (ACCUPRIL) 20 MG tablet, Take 20 mg by mouth Every Night., Disp: , Rfl:   •  simvastatin (ZOCOR) 40 MG tablet, Take 40 mg by mouth Every Night. Takes 1/2 tablet nightly., Disp: , Rfl:   •  vitamin B-12 (CYANOCOBALAMIN) 500 MCG tablet, Take 500 mcg by mouth 1 (One) Time Per Week., Disp: , Rfl:   No current facility-administered medications for this visit.     Past Medical History:   Diagnosis Date   • Anemia    • Hyperlipidemia    • Hypertension    • SCC (squamous cell carcinoma), scalp/neck     right neck       Past Surgical History:   Procedure Laterality Date   • COLONOSCOPY     • HYSTERECTOMY     • KNEE SURGERY         Social History     Socioeconomic History   • Marital status: Legally      Spouse name: Not on file   • Number of children: Not on file   • Years of education: Not on file   • Highest education level: Not on file   Tobacco Use   • Smoking status: Never Smoker   • Smokeless tobacco: Never Used   Substance and Sexual Activity   • Alcohol use: No   • Drug use: No   • Sexual activity: Defer       Family History   Problem Relation Age of Onset   • Heart disease Other    • Diabetes Other    • Hypertension Other    • Obesity Other    •  "Kidney disease Other          Temp 97.5 °F (36.4 °C)   Ht 154.9 cm (61\")   Wt 63.5 kg (140 lb)   BMI 26.45 kg/m²       Physical Exam  Constitutional:  They  appear well-developed and well-nourished. There are no obvious deformities. No distress. The vital signs are reviewed  Pulmonary/Chest: Effort normal.   GI: Soft. The patient exhibits no distension and no mass. There is no tenderness. There is no rebound and no guarding. No hernia.   Neurological: Patient is alert and oriented to person, place, and time.   Skin: Skin is warm and dry. Not diaphoretic.   Psychiatric:  normal mood and affect. Not agitated.       Data  Results for orders placed or performed in visit on 03/26/20   POC Urinalysis Dipstick, Multipro   Result Value Ref Range    Color Yellow Yellow, Straw, Dark Yellow, Kelly    Clarity, UA Clear Clear    Glucose, UA Negative Negative, 1000 mg/dL (3+) mg/dL    Bilirubin Negative Negative    Ketones, UA Negative Negative    Specific Gravity  1.020 1.005 - 1.030    Blood, UA Negative Negative    pH, Urine 7.0 5.0 - 8.0    Protein, POC Negative Negative mg/dL    Urobilinogen, UA Normal Normal    Nitrite, UA Negative Negative    Leukocytes Small (1+) (A) Negative     Lab Results   Component Value Date    GLUCOSE 91 03/14/2020    BUN 18 03/14/2020    CREATININE 0.76 03/14/2020    EGFRIFNONA 74 03/14/2020    EGFRIFAFRI >60.0 02/27/2020    BCR 23.7 03/14/2020    K 3.6 03/14/2020    CO2 29.0 03/14/2020    CALCIUM 9.7 03/14/2020    ALBUMIN 4.40 03/14/2020    AST 21 03/14/2020    ALT 17 03/14/2020     Lab Results   Component Value Date    WBC 6.38 03/14/2020    HGB 13.2 03/14/2020    HCT 38.2 03/14/2020    MCV 86.0 03/14/2020     03/14/2020     MRI ABDOMEN W WO CONTRAST- 3/26/2020 10:11 AM CDT     HISTORY: Left-sided abdominal pain, abnormal CT of the kidneys     COMPARISON: CT abdomen and pelvis contrast 03/14/2020     Technical: Multiplanar, multisequence imaging was performed through the  abdomen " before and after the administration of IV contrast.     FINDINGS:  There is no loss of signal within the liver on opposed phase imaging to  suggest hepatic steatosis. Tiny cysts are seen in the liver. The liver  is otherwise unremarkable.     The gallbladder, spleen, and adrenal glands are unremarkable. Pancreas  is grossly normal in appearance.     Tiny cysts are seen in the left kidney. A T1 and T2 hypointense mass  arises from the medial aspect of the inferior right kidney which  measures 4.4 x 3.4 x 4.1 cm in size. On postcontrast imaging, there is  heterogeneous enhancement of the mass, compatible with a solid lesion  and concerning for renal cell carcinoma. No additional enhancing renal  lesions are identified.     A hiatal hernia is present.     IMPRESSION:  Solid, enhancing mass arising from the inferomedial right  kidney measuring up to 4.4 cm in size. This is most concerning for renal  cell carcinoma.  This report was finalized on 03/26/2020 10:16 by Dr. Yovany Jimenez MD    These images were made available to me to review independently.  I did review the radiologist's report described above with regard to the urologic findings.   /Quinton Rainey MD        Assessment and Plan  Diagnoses and all orders for this visit:    Right renal mass  -     POC Urinalysis Dipstick, Multipro  -     Case Request; Standing  -     CBC & Differential; Future  -     Comprehensive Metabolic Panel; Future  -     Case Request    Other orders  -     Follow Anesthesia Guidelines / Standing Orders; Future  -     Obtain Informed Consent  -     Provide NPO Instructions to Patient; Future  -     Chlorhexidine Skin Prep; Future    More than half of this 25 minute visit was spent on counseling/coordinating care for  the patient about the following:   The solid enhancing mass is indeed worrisome for renal cell carcinoma. She will need a robot assisted laparoscopic right radical nephrectomy.  I went over the procedure with her including  the expected postoperative course.  Her daughter listened in on speaker phone.  I went over the films with the patient explaining why an enhancing mass is concerning.  We talked about the role of partial versus radical nephrectomy.  I do not think she is a reasonable candidate for partial nephrectomy.  I did tell them that approximately 25 to 35% of patients will develop chronic kidney disease stage 3 or worse following a radical nephrectomy.  However, some studies have shown the complication rate significantly higher with partial nephrectomy anyway in this age group.  We talked about the challenging situation and dealing with the coronavirus right now and how delaying the procedure a few weeks would be very unlikely to change her overall clinical course whereas if she were to catch the virus it may greatly increase her perioperative mortality.  They have consented to proceed.   The entire time allotted was spent as face to face time with the patient.     (Please note that portions of this note were completed with a voice recognition program.)  Quinton Rainey MD  03/26/20  12:11

## 2020-03-24 NOTE — TELEPHONE ENCOUNTER
----- Message from Quinton Rainey MD sent at 3/23/2020  3:43 PM CDT -----  Regarding: Needs to be moved.   When is Ms. Awad MRI scheduled? If it is in the PM, [facility] should be able to move it up earlier in the day so she could be moved up to the AM clinic.

## 2020-03-24 NOTE — TELEPHONE ENCOUNTER
I left message for patient to call back to see if she would be ok moving her MRI and appointment earlier in the morning. If so, we will need to check with scheduling for the MRI at an earlier time. If not, we will need to move her to a different time in the morning next week.

## 2020-03-26 ENCOUNTER — OFFICE VISIT (OUTPATIENT)
Dept: UROLOGY | Facility: CLINIC | Age: 75
End: 2020-03-26

## 2020-03-26 ENCOUNTER — HOSPITAL ENCOUNTER (OUTPATIENT)
Dept: MRI IMAGING | Facility: HOSPITAL | Age: 75
End: 2020-03-26

## 2020-03-26 ENCOUNTER — APPOINTMENT (OUTPATIENT)
Dept: MRI IMAGING | Facility: HOSPITAL | Age: 75
End: 2020-03-26

## 2020-03-26 ENCOUNTER — HOSPITAL ENCOUNTER (OUTPATIENT)
Dept: MRI IMAGING | Facility: HOSPITAL | Age: 75
Discharge: HOME | End: 2020-03-26
Admitting: UROLOGY

## 2020-03-26 VITALS — BODY MASS INDEX: 26.43 KG/M2 | TEMPERATURE: 97.5 F | WEIGHT: 140 LBS | HEIGHT: 61 IN

## 2020-03-26 DIAGNOSIS — N28.89 RIGHT RENAL MASS: ICD-10-CM

## 2020-03-26 DIAGNOSIS — N28.89 RIGHT RENAL MASS: Primary | ICD-10-CM

## 2020-03-26 LAB
BILIRUB BLD-MCNC: NEGATIVE MG/DL
CLARITY, POC: CLEAR
COLOR UR: YELLOW
GLUCOSE UR STRIP-MCNC: NEGATIVE MG/DL
KETONES UR QL: NEGATIVE
LEUKOCYTE EST, POC: ABNORMAL
NITRITE UR-MCNC: NEGATIVE MG/ML
PH UR: 7 [PH] (ref 5–8)
PROT UR STRIP-MCNC: NEGATIVE MG/DL
RBC # UR STRIP: NEGATIVE /UL
SP GR UR: 1.02 (ref 1–1.03)
UROBILINOGEN UR QL: NORMAL

## 2020-03-26 PROCEDURE — 0 GADOBENATE DIMEGLUMINE 529 MG/ML SOLUTION: Performed by: UROLOGY

## 2020-03-26 PROCEDURE — 81003 URINALYSIS AUTO W/O SCOPE: CPT | Performed by: UROLOGY

## 2020-03-26 PROCEDURE — A9577 INJ MULTIHANCE: HCPCS | Performed by: UROLOGY

## 2020-03-26 PROCEDURE — 82565 ASSAY OF CREATININE: CPT

## 2020-03-26 PROCEDURE — 99214 OFFICE O/P EST MOD 30 MIN: CPT | Performed by: UROLOGY

## 2020-03-26 PROCEDURE — 74183 MRI ABD W/O CNTR FLWD CNTR: CPT

## 2020-03-26 RX ADMIN — GADOBENATE DIMEGLUMINE 13 ML: 529 INJECTION, SOLUTION INTRAVENOUS at 09:16

## 2020-03-30 ENCOUNTER — TELEPHONE (OUTPATIENT)
Dept: UROLOGY | Facility: CLINIC | Age: 75
End: 2020-03-30

## 2020-03-30 LAB — CREAT BLDA-MCNC: 0.9 MG/DL (ref 0.6–1.3)

## 2020-03-31 ENCOUNTER — RESULTS ENCOUNTER (OUTPATIENT)
Dept: UROLOGY | Facility: CLINIC | Age: 75
End: 2020-03-31

## 2020-03-31 DIAGNOSIS — N28.89 RIGHT RENAL MASS: ICD-10-CM

## 2020-04-03 ENCOUNTER — PREP FOR SURGERY (OUTPATIENT)
Dept: OTHER | Facility: HOSPITAL | Age: 75
End: 2020-04-03

## 2020-04-20 ENCOUNTER — TELEPHONE (OUTPATIENT)
Dept: NEUROLOGY | Facility: CLINIC | Age: 75
End: 2020-04-20

## 2020-04-20 NOTE — TELEPHONE ENCOUNTER
Provider: JANE SPAIN  Caller: NIKKIE HUFFMAN  Relationship to Patient: SELF  Phone Number: [phone]    Reason for Call: PT CALLED CANCELED APPT 5-, STATES SHE WILL BE HAVING SURGERY AROUND THAT TIME AND WILL NOT FEE LIKE COMING IN OR ANYTHING ELSE, SHE CALL BACK TO RESCHEDULE.

## 2020-05-05 ENCOUNTER — TELEPHONE (OUTPATIENT)
Dept: UROLOGY | Facility: CLINIC | Age: 75
End: 2020-05-05

## 2020-05-05 NOTE — TELEPHONE ENCOUNTER
Spoke with patient about changing arrival time for surgery on 05-13-20 to 0930. All questions answered and she voiced understanding

## 2020-05-06 ENCOUNTER — APPOINTMENT (OUTPATIENT)
Dept: PREADMISSION TESTING | Facility: HOSPITAL | Age: 75
End: 2020-05-06

## 2020-05-08 ENCOUNTER — TRANSCRIBE ORDERS (OUTPATIENT)
Dept: LAB | Facility: HOSPITAL | Age: 75
End: 2020-05-08

## 2020-05-08 DIAGNOSIS — Z01.818 PRE-OP TESTING: Primary | ICD-10-CM

## 2020-05-11 ENCOUNTER — APPOINTMENT (OUTPATIENT)
Dept: PREADMISSION TESTING | Facility: HOSPITAL | Age: 75
End: 2020-05-11

## 2020-05-11 ENCOUNTER — APPOINTMENT (OUTPATIENT)
Dept: LAB | Facility: HOSPITAL | Age: 75
End: 2020-05-11

## 2020-05-11 ENCOUNTER — TELEPHONE (OUTPATIENT)
Dept: UROLOGY | Facility: CLINIC | Age: 75
End: 2020-05-11

## 2020-05-11 VITALS
DIASTOLIC BLOOD PRESSURE: 73 MMHG | OXYGEN SATURATION: 98 % | BODY MASS INDEX: 26.18 KG/M2 | HEIGHT: 61 IN | WEIGHT: 138.67 LBS | SYSTOLIC BLOOD PRESSURE: 172 MMHG | HEART RATE: 68 BPM | RESPIRATION RATE: 18 BRPM

## 2020-05-11 LAB
ALBUMIN SERPL-MCNC: 4.2 G/DL (ref 3.5–5.2)
ALBUMIN/GLOB SERPL: 1.3 G/DL
ALP SERPL-CCNC: 60 U/L (ref 39–117)
ALT SERPL W P-5'-P-CCNC: 12 U/L (ref 1–33)
ANION GAP SERPL CALCULATED.3IONS-SCNC: 8 MMOL/L (ref 5–15)
AST SERPL-CCNC: 17 U/L (ref 1–32)
BASOPHILS # BLD AUTO: 0.04 10*3/MM3 (ref 0–0.2)
BASOPHILS NFR BLD AUTO: 0.7 % (ref 0–1.5)
BILIRUB SERPL-MCNC: 0.3 MG/DL (ref 0.2–1.2)
BUN BLD-MCNC: 22 MG/DL (ref 8–23)
BUN/CREAT SERPL: 23.7 (ref 7–25)
CALCIUM SPEC-SCNC: 9.8 MG/DL (ref 8.6–10.5)
CHLORIDE SERPL-SCNC: 97 MMOL/L (ref 98–107)
CO2 SERPL-SCNC: 28 MMOL/L (ref 22–29)
CREAT BLD-MCNC: 0.93 MG/DL (ref 0.57–1)
DEPRECATED RDW RBC AUTO: 42.8 FL (ref 37–54)
EOSINOPHIL # BLD AUTO: 0.13 10*3/MM3 (ref 0–0.4)
EOSINOPHIL NFR BLD AUTO: 2.1 % (ref 0.3–6.2)
ERYTHROCYTE [DISTWIDTH] IN BLOOD BY AUTOMATED COUNT: 13.2 % (ref 12.3–15.4)
GFR SERPL CREATININE-BSD FRML MDRD: 59 ML/MIN/1.73
GLOBULIN UR ELPH-MCNC: 3.2 GM/DL
GLUCOSE BLD-MCNC: 107 MG/DL (ref 65–99)
HCT VFR BLD AUTO: 38.9 % (ref 34–46.6)
HGB BLD-MCNC: 13.1 G/DL (ref 12–15.9)
IMM GRANULOCYTES # BLD AUTO: 0.01 10*3/MM3 (ref 0–0.05)
IMM GRANULOCYTES NFR BLD AUTO: 0.2 % (ref 0–0.5)
LYMPHOCYTES # BLD AUTO: 0.8 10*3/MM3 (ref 0.7–3.1)
LYMPHOCYTES NFR BLD AUTO: 13.2 % (ref 19.6–45.3)
MCH RBC QN AUTO: 29.6 PG (ref 26.6–33)
MCHC RBC AUTO-ENTMCNC: 33.7 G/DL (ref 31.5–35.7)
MCV RBC AUTO: 88 FL (ref 79–97)
MONOCYTES # BLD AUTO: 0.69 10*3/MM3 (ref 0.1–0.9)
MONOCYTES NFR BLD AUTO: 11.4 % (ref 5–12)
NEUTROPHILS # BLD AUTO: 4.4 10*3/MM3 (ref 1.7–7)
NEUTROPHILS NFR BLD AUTO: 72.4 % (ref 42.7–76)
NRBC BLD AUTO-RTO: 0 /100 WBC (ref 0–0.2)
PLATELET # BLD AUTO: 269 10*3/MM3 (ref 140–450)
PMV BLD AUTO: 10 FL (ref 6–12)
POTASSIUM BLD-SCNC: 4.2 MMOL/L (ref 3.5–5.2)
PROT SERPL-MCNC: 7.4 G/DL (ref 6–8.5)
RBC # BLD AUTO: 4.42 10*6/MM3 (ref 3.77–5.28)
SODIUM BLD-SCNC: 133 MMOL/L (ref 136–145)
WBC NRBC COR # BLD: 6.07 10*3/MM3 (ref 3.4–10.8)

## 2020-05-11 PROCEDURE — 36415 COLL VENOUS BLD VENIPUNCTURE: CPT | Performed by: UROLOGY

## 2020-05-11 PROCEDURE — 87635 SARS-COV-2 COVID-19 AMP PRB: CPT | Performed by: UROLOGY

## 2020-05-11 PROCEDURE — 80053 COMPREHEN METABOLIC PANEL: CPT | Performed by: UROLOGY

## 2020-05-11 PROCEDURE — 85025 COMPLETE CBC W/AUTO DIFF WBC: CPT | Performed by: UROLOGY

## 2020-05-11 NOTE — DISCHARGE INSTRUCTIONS
DAY OF SURGERY INSTRUCTIONS        YOUR SURGEON: ***SUSAN    PROCEDURE: ***NEPHRECTOMY LAPAROSCOPIC WITH DAVINCI ROBOT     DATE OF SURGERY: ***5/13/2020    ARRIVAL TIME: AS DIRECTED BY OFFICE    YOU MAY TAKE THE FOLLOWING MEDICATION(S) THE MORNING OF SURGERY WITH A SIP OF WATER: ***METOPROLOL      ALL OTHER HOME MEDICATION CHECK WITH YOUR PHYSICIAN                MANAGING PAIN AFTER SURGERY    We know you are probably wondering what your pain will be like after surgery.  Following surgery it is unrealistic to expect you will not have pain.   Pain is how our bodies let us know that something is wrong or cautions us to be careful.  That said, our goal is to make your pain tolerable.    Methods we may use to treat your pain include (oral or IV medications, PCAs, epidurals, nerve blocks, etc.)   While some procedures require IV pain medications for a short time after surgery, transitioning to pain medications by mouth allows for better management of pain.   Your nurse will encourage you to take oral pain medications whenever possible.  IV medications work almost immediately, but only last a short while.  Taking medications by mouth allows for a more constant level of medication in your blood stream for a longer period of time.      Once your pain is out of control it is harder to get back under control.  It is important you are aware when your next dose of pain medication is due.  If you are admitted, your nurse may write the time of your next dose on the white board in your room to help you remember.      We are interested in your pain and encourage you to inform us about aggravating factors during your visit.   Many times a simple repositioning every few hours can make a big difference.    If your physician says it is okay, do not let your pain prevent you from getting out of bed. Be sure to call your nurse for assistance prior to getting up so you do not fall.      Before surgery, please decide your tolerable pain  goal.  These faces help describe the pain ratings we use on a 0-10 scale.   Be prepared to tell us your goal and whether or not you take pain or anxiety medications at home.          BEFORE YOU COME TO THE HOSPITAL  (Pre-op instructions)  • Do not eat, drink, smoke or chew gum after midnight the night before surgery.  This also includes no mints.  • Morning of surgery take only the medicines you have been instructed with a sip of water unless otherwise instructed  by your physician.  • Do not shave, wear makeup or dark nail polish.  • Remove all jewelry including rings.  • Leave anything you consider valuable at home.  • Leave your suitcase in the car until after your surgery.  • Bring the following with you if applicable:  o Picture ID and insurance, Medicare or Medicaid cards  o Co-pay/deductible required by insurance (cash, check, credit card)  o Copy of advance directive, living will or power-of- documents if not brought to PAT  o CPAP or BIPAP mask and tubing  o Relaxation aids ( book, magazine), etc.  o Hearing aids                        ON THE DAY OF SURGERY  · On the day of surgery check in at registration located at the main entrance of the hospital.   ? You will be registered and given a beeper with instructions where to wait in the main lobby.  ? When your beeper lights up and vibrates a member of the Outpatient Surgery staff will meet you at the double doors under the stair steps and escort you to your preoperative room.   · You may have cloth compression devices placed on your legs. These help to prevent blood clots and reduce swelling in your legs.  · An IV may be inserted into one of your veins.  · In the operating room, you may be given one or more of the following:  ? A medicine to help you relax (sedative).  ? A medicine to numb the area (local anesthetic).  ? A medicine to make you fall asleep (general anesthetic).  ? A medicine that is injected into an area of your body to numb  "everything below the injection site (regional anesthetic).  · Your surgical site will be marked or identified.  · You may be given an antibiotic through your IV to help prevent infection.  Contact a health care provider if you:  · Develop a fever of more than 100.4°F (38°C) or other feelings of illness during the 48 hours before your surgery.  · Have symptoms that get worse.  Have questions or concerns about your surgery    General Anesthesia/Surgery, Adult  General anesthesia is the use of medicines to make a person \"go to sleep\" (unconscious) for a medical procedure. General anesthesia must be used for certain procedures, and is often recommended for procedures that:  · Last a long time.  · Require you to be still or in an unusual position.  · Are major and can cause blood loss.  The medicines used for general anesthesia are called general anesthetics. As well as making you unconscious for a certain amount of time, these medicines:  · Prevent pain.  · Control your blood pressure.  · Relax your muscles.  Tell a health care provider about:  · Any allergies you have.  · All medicines you are taking, including vitamins, herbs, eye drops, creams, and over-the-counter medicines.  · Any problems you or family members have had with anesthetic medicines.  · Types of anesthetics you have had in the past.  · Any blood disorders you have.  · Any surgeries you have had.  · Any medical conditions you have.  · Any recent upper respiratory, chest, or ear infections.  · Any history of:  ? Heart or lung conditions, such as heart failure, sleep apnea, asthma, or chronic obstructive pulmonary disease (COPD).  ?  service.  ? Depression or anxiety.  · Any tobacco or drug use, including marijuana or alcohol use.  · Whether you are pregnant or may be pregnant.  What are the risks?  Generally, this is a safe procedure. However, problems may occur, including:  · Allergic reaction.  · Lung and heart problems.  · Inhaling food or " liquid from the stomach into the lungs (aspiration).  · Nerve injury.  · Air in the bloodstream, which can lead to stroke.  · Extreme agitation or confusion (delirium) when you wake up from the anesthetic.  · Waking up during your procedure and being unable to move. This is rare.  These problems are more likely to develop if you are having a major surgery or if you have an advanced or serious medical condition. You can prevent some of these complications by answering all of your health care provider's questions thoroughly and by following all instructions before your procedure.  General anesthesia can cause side effects, including:  · Nausea or vomiting.  · A sore throat from the breathing tube.  · Hoarseness.  · Wheezing or coughing.  · Shaking chills.  · Tiredness.  · Body aches.  · Anxiety.  · Sleepiness or drowsiness.  · Confusion or agitation.  RISKS AND COMPLICATIONS OF SURGERY  Your health care provider will discuss possible risks and complications with you before surgery. Common risks and complications include:    · Problems due to the use of anesthetics.  · Blood loss and replacement (does not apply to minor surgical procedures).  · Temporary increase in pain due to surgery.  · Uncorrected pain or problems that the surgery was meant to correct.  · Infection.  · New damage.    What happens before the procedure?    Medicines  Ask your health care provider about:  · Changing or stopping your regular medicines. This is especially important if you are taking diabetes medicines or blood thinners.  · Taking medicines such as aspirin and ibuprofen. These medicines can thin your blood. Do not take these medicines unless your health care provider tells you to take them.  · Taking over-the-counter medicines, vitamins, herbs, and supplements. Do not take these during the week before your procedure unless your health care provider approves them.  General instructions  · Starting 3-6 weeks before the procedure, do not  use any products that contain nicotine or tobacco, such as cigarettes and e-cigarettes. If you need help quitting, ask your health care provider.  · If you brush your teeth on the morning of the procedure, make sure to spit out all of the toothpaste.  · Tell your health care provider if you become ill or develop a cold, cough, or fever.  · If instructed by your health care provider, bring your sleep apnea device with you on the day of your surgery (if applicable).  · Ask your health care provider if you will be going home the same day, the following day, or after a longer hospital stay.  ? Plan to have someone take you home from the hospital or clinic.  ? Plan to have a responsible adult care for you for at least 24 hours after you leave the hospital or clinic. This is important.  What happens during the procedure?  · You will be given anesthetics through both of the following:  ? A mask placed over your nose and mouth.  ? An IV in one of your veins.  · You may receive a medicine to help you relax (sedative).  · After you are unconscious, a breathing tube may be inserted down your throat to help you breathe. This will be removed before you wake up.  · An anesthesia specialist will stay with you throughout your procedure. He or she will:  ? Keep you comfortable and safe by continuing to give you medicines and adjusting the amount of medicine that you get.  ? Monitor your blood pressure, pulse, and oxygen levels to make sure that the anesthetics do not cause any problems.  The procedure may vary among health care providers and hospitals.  What happens after the procedure?  · Your blood pressure, temperature, heart rate, breathing rate, and blood oxygen level will be monitored until the medicines you were given have worn off.  · You will wake up in a recovery area. You may wake up slowly.  · If you feel anxious or agitated, you may be given medicine to help you calm down.  · If you will be going home the same day, your  health care provider may check to make sure you can walk, drink, and urinate.  · Your health care provider will treat any pain or side effects you have before you go home.  · Do not drive for 24 hours if you were given a sedative.  Summary  · General anesthesia is used to keep you still and prevent pain during a procedure.  · It is important to tell your healthcare provider about your medical history and any surgeries you have had, and previous experience with anesthesia.  · Follow your healthcare provider’s instructions about when to stop eating, drinking, or taking certain medicines before your procedure.  · Plan to have someone take you home from the hospital or clinic.  This information is not intended to replace advice given to you by your health care provider. Make sure you discuss any questions you have with your health care provider.  Document Released: 03/26/2009 Document Revised: 08/03/2018 Document Reviewed: 08/03/2018  [employer] Interactive Patient Education © 2019 [employer]       Fall Prevention in Hospitals, Adult  As a hospital patient, your condition and the treatments you receive can increase your risk for falls. Some additional risk factors for falls in a hospital include:  · Being in an unfamiliar environment.  · Being on bed rest.  · Your surgery.  · Taking certain medicines.  · Your tubing requirements, such as intravenous (IV) therapy or catheters.  It is important that you learn how to decrease fall risks while at the hospital. Below are important tips that can help prevent falls.  SAFETY TIPS FOR PREVENTING FALLS  Talk about your risk of falling.  · Ask your health care provider why you are at risk for falling. Is it your medicine, illness, tubing placement, or something else?  · Make a plan with your health care provider to keep you safe from falls.  · Ask your health care provider or pharmacist about side effects of your medicines. Some medicines can make you dizzy or affect your  coordination.  Ask for help.  · Ask for help before getting out of bed. You may need to press your call button.  · Ask for assistance in getting safely to the toilet.  · Ask for a walker or cane to be put at your bedside. Ask that most of the side rails on your bed be placed up before your health care provider leaves the room.  · Ask family or friends to sit with you.  · Ask for things that are out of your reach, such as your glasses, hearing aids, telephone, bedside table, or call button.  Follow these tips to avoid falling:  · Stay lying or seated, rather than standing, while waiting for help.  · Wear rubber-soled slippers or shoes whenever you walk in the hospital.  · Avoid quick, sudden movements.  ¨ Change positions slowly.  ¨ Sit on the side of your bed before standing.  ¨ Stand up slowly and wait before you start to walk.  · Let your health care provider know if there is a spill on the floor.  · Pay careful attention to the medical equipment, electrical cords, and tubes around you.  · When you need help, use your call button by your bed or in the bathroom. Wait for one of your health care providers to help you.  · If you feel dizzy or unsure of your footing, return to bed and wait for assistance.  · Avoid being distracted by the TV, telephone, or another person in your room.  · Do not lean or support yourself on rolling objects, such as IV poles or bedside tables.     This information is not intended to replace advice given to you by your health care provider. Make sure you discuss any questions you have with your health care provider.     Document Released: 12/15/2001 Document Revised: 01/08/2016 Document Reviewed: 08/25/2013  [employer] Interactive Patient Education ©2016 [employer]       [facility]  CHG 4% Patient Instruction Sheet    Chlorhexidine Before Surgery  Chlorhexidine gluconate (CHG) is a germ-killing (antiseptic) solution that is used to clean the skin. It gets rid of the bacteria  that normally live on the skin. Cleaning your skin with CHG before surgery helps lower the risk for infection after surgery.    How to use CHG solution  · You will take 2 showers, one shower the night before surgery, the second shower the morning of surgery before coming to the hospital.  · Use CHG only as told by your health care provider, and follow the instructions on the label.  · Use CHG solution while taking a shower. Follow these steps when using CHG solution (unless your health care provider gives you different instructions):  1. Start the shower.  2. Use your normal soap and shampoo to wash your face and hair.  3. Turn off the shower or move out of the shower stream.  4. Pour the CHG onto a clean washcloth. Do not use any type of brush or rough-edged sponge.  5. Starting at your neck, lather your body down to your toes. Make sure you:  6. Pay special attention to the part of your body where you will be having surgery. Scrub this area for at least 1 minute.  7. Use the full amount of CHG as directed. Usually, this is one half bottle for each shower.  8. Do not use CHG on your head or face. If the solution gets into your ears or eyes, rinse them well with water.  9. Avoid your genital area.  10. Avoid any areas of skin that have broken skin, cuts, or scrapes.  11. Scrub your back and under your arms. Make sure to wash skin folds.  12. Let the lather sit on your skin for 1-2 minutes or as long as told by your health care  provider.  13. Thoroughly rinse your entire body in the shower. Make sure that all body creases and crevices are rinsed well.  14. Dry off with a clean towel. Do not put any substances on your body afterward, such as powder, lotion, or perfume.  15. Put on clean clothes or pajamas.  16. If it is the night before your surgery, sleep in clean sheets.    What are the risks?  Risks of using CHG include:  · A skin reaction.  · Hearing loss, if CHG gets in your ears.  · Eye injury, if CHG gets in  your eyes and is not rinsed out.  · The CHG product catching fire.  Make sure that you avoid smoking and flames after applying CHG to your skin.  Do not use CHG:  · If you have a chlorhexidine allergy or have previously reacted to chlorhexidine.  · On babies younger than 2 months of age.      On the day of surgery, when you are taken to your room in Outpatient Surgery you will be given a CHG prepackaged cloth to wipe the site for your surgery.  How to use CHG prepackaged cloths  · Follow the instructions on the label.  · Use the CHG cloth on clean, dry skin. Follow these steps when using a CHG cloth (unless your health care provider gives you different instructions):  1. Using the CHG cloth, vigorously scrub the part of your body where you will be having surgery. Scrub using a back-and-forth motion for 3 minutes. The area on your body should be completely wet with CHG when you are finished scrubbing.  2. Do not rinse. Discard the cloth and let the area air-dry for 1 minute. Do not put any substances on your body afterward, such as powder, lotion, or perfume.  Contact a health care provider if:  · Your skin gets irritated after scrubbing.  · You have questions about using your solution or cloth.  Get help right away if:  · Your eyes become very red or swollen.  · Your eyes itch badly.  · Your skin itches badly and is red or swollen.  · Your hearing changes.  · You have trouble seeing.  · You have swelling or tingling in your mouth or throat.  · You have trouble breathing.  · You swallow any chlorhexidine.  Summary  · Chlorhexidine gluconate (CHG) is a germ-killing (antiseptic) solution that is used to clean the skin. Cleaning your skin with CHG before surgery helps lower the risk for infection after surgery.  · You may be given CHG to use at home. It may be in a bottle or in a prepackaged cloth to use on your skin. Carefully follow your health care provider's instructions and the instructions on the product  label.  · Do not use CHG if you have a chlorhexidine allergy.  · Contact your health care provider if your skin gets irritated after scrubbing.  This information is not intended to replace advice given to you by your health care provider. Make sure you discuss any questions you have with your health care provider.  Document Released: 09/11/2013 Document Revised: 11/15/2018 Document Reviewed: 11/15/2018  "[employer] Interactive Patient Education © 2019 "[employer].          PATIENT/FAMILY/RESPONSIBLE PARTY VERBALIZES UNDERSTANDING OF ABOVE EDUCATION.  COPY OF PAIN SCALE GIVEN AND REVIEWED WITH VERBALIZED UNDERSTANDING.

## 2020-05-12 LAB — SARS-COV-2 RNA RESP QL NAA+PROBE: NOT DETECTED

## 2020-05-13 ENCOUNTER — ANESTHESIA EVENT (OUTPATIENT)
Dept: PERIOP | Facility: HOSPITAL | Age: 75
End: 2020-05-13

## 2020-05-13 ENCOUNTER — HOSPITAL ENCOUNTER (INPATIENT)
Facility: HOSPITAL | Age: 75
LOS: 1 days | Discharge: HOME | End: 2020-05-14
Attending: UROLOGY | Admitting: UROLOGY

## 2020-05-13 ENCOUNTER — ANESTHESIA (OUTPATIENT)
Dept: PERIOP | Facility: HOSPITAL | Age: 75
End: 2020-05-13

## 2020-05-13 DIAGNOSIS — N28.89 RIGHT RENAL MASS: ICD-10-CM

## 2020-05-13 LAB
ABO GROUP BLD: NORMAL
BLD GP AB SCN SERPL QL: NEGATIVE
RH BLD: POSITIVE
T&S EXPIRATION DATE: NORMAL

## 2020-05-13 PROCEDURE — 25010000002 NEOSTIGMINE 10 MG/10ML SOLUTION: Performed by: NURSE ANESTHETIST, CERTIFIED REGISTERED

## 2020-05-13 PROCEDURE — 86850 RBC ANTIBODY SCREEN: CPT | Performed by: UROLOGY

## 2020-05-13 PROCEDURE — 25010000002 DEXAMETHASONE PER 1 MG: Performed by: ANESTHESIOLOGY

## 2020-05-13 PROCEDURE — 86901 BLOOD TYPING SEROLOGIC RH(D): CPT | Performed by: UROLOGY

## 2020-05-13 PROCEDURE — 25010000003 CEFAZOLIN SODIUM-DEXTROSE 2-3 GM-%(50ML) RECONSTITUTED SOLUTION: Performed by: UROLOGY

## 2020-05-13 PROCEDURE — 25010000002 PROPOFOL 10 MG/ML EMULSION: Performed by: NURSE ANESTHETIST, CERTIFIED REGISTERED

## 2020-05-13 PROCEDURE — 0TT04ZZ RESECTION OF RIGHT KIDNEY, PERCUTANEOUS ENDOSCOPIC APPROACH: ICD-10-PCS | Performed by: UROLOGY

## 2020-05-13 PROCEDURE — 25010000002 ONDANSETRON PER 1 MG: Performed by: NURSE ANESTHETIST, CERTIFIED REGISTERED

## 2020-05-13 PROCEDURE — 8E0W4CZ ROBOTIC ASSISTED PROCEDURE OF TRUNK REGION, PERCUTANEOUS ENDOSCOPIC APPROACH: ICD-10-PCS | Performed by: UROLOGY

## 2020-05-13 PROCEDURE — 25010000002 HYDROMORPHONE 1 MG/ML SOLUTION: Performed by: NURSE ANESTHETIST, CERTIFIED REGISTERED

## 2020-05-13 PROCEDURE — 88307 TISSUE EXAM BY PATHOLOGIST: CPT | Performed by: UROLOGY

## 2020-05-13 PROCEDURE — 50545 LAPARO RADICAL NEPHRECTOMY: CPT | Performed by: UROLOGY

## 2020-05-13 PROCEDURE — 86920 COMPATIBILITY TEST SPIN: CPT

## 2020-05-13 PROCEDURE — 0T9B70Z DRAINAGE OF BLADDER WITH DRAINAGE DEVICE, VIA NATURAL OR ARTIFICIAL OPENING: ICD-10-PCS | Performed by: UROLOGY

## 2020-05-13 PROCEDURE — S0260 H&P FOR SURGERY: HCPCS | Performed by: UROLOGY

## 2020-05-13 PROCEDURE — 25010000002 DEXAMETHASONE PER 1 MG: Performed by: NURSE ANESTHETIST, CERTIFIED REGISTERED

## 2020-05-13 PROCEDURE — 94799 UNLISTED PULMONARY SVC/PX: CPT

## 2020-05-13 PROCEDURE — 25010000002 KETOROLAC TROMETHAMINE PER 15 MG: Performed by: UROLOGY

## 2020-05-13 PROCEDURE — 86900 BLOOD TYPING SEROLOGIC ABO: CPT | Performed by: UROLOGY

## 2020-05-13 DEVICE — SUREFORM 45 RELOAD WHITE
Type: IMPLANTABLE DEVICE | Site: ABDOMEN | Status: FUNCTIONAL
Brand: SUREFORM

## 2020-05-13 DEVICE — SEAL HEMO SURG ARISTA/AH ABS/PWDR 3GM: Type: IMPLANTABLE DEVICE | Site: ABDOMEN | Status: FUNCTIONAL

## 2020-05-13 DEVICE — CLIP LIG HEMOLOK PA LG 6CT PRP: Type: IMPLANTABLE DEVICE | Status: FUNCTIONAL

## 2020-05-13 RX ORDER — PHENYLEPHRINE HCL IN 0.9% NACL 0.8MG/10ML
SYRINGE (ML) INTRAVENOUS AS NEEDED
Status: DISCONTINUED | OUTPATIENT
Start: 2020-05-13 | End: 2020-05-13 | Stop reason: SURG

## 2020-05-13 RX ORDER — MIDAZOLAM HYDROCHLORIDE 1 MG/ML
1 INJECTION INTRAMUSCULAR; INTRAVENOUS
Status: DISCONTINUED | OUTPATIENT
Start: 2020-05-13 | End: 2020-05-13 | Stop reason: HOSPADM

## 2020-05-13 RX ORDER — SUFENTANIL CITRATE 50 UG/ML
INJECTION EPIDURAL; INTRAVENOUS AS NEEDED
Status: DISCONTINUED | OUTPATIENT
Start: 2020-05-13 | End: 2020-05-13 | Stop reason: SURG

## 2020-05-13 RX ORDER — SODIUM CHLORIDE 450 MG/100ML
150 INJECTION, SOLUTION INTRAVENOUS CONTINUOUS
Status: DISCONTINUED | OUTPATIENT
Start: 2020-05-13 | End: 2020-05-14 | Stop reason: HOSPADM

## 2020-05-13 RX ORDER — LIDOCAINE HYDROCHLORIDE 20 MG/ML
INJECTION, SOLUTION INFILTRATION; PERINEURAL AS NEEDED
Status: DISCONTINUED | OUTPATIENT
Start: 2020-05-13 | End: 2020-05-13 | Stop reason: SURG

## 2020-05-13 RX ORDER — BUPIVACAINE HYDROCHLORIDE 5 MG/ML
INJECTION, SOLUTION PERINEURAL
Status: COMPLETED | OUTPATIENT
Start: 2020-05-13 | End: 2020-05-13

## 2020-05-13 RX ORDER — NEOSTIGMINE METHYLSULFATE 1 MG/ML
INJECTION, SOLUTION INTRAVENOUS AS NEEDED
Status: DISCONTINUED | OUTPATIENT
Start: 2020-05-13 | End: 2020-05-13 | Stop reason: SURG

## 2020-05-13 RX ORDER — DOCUSATE SODIUM 100 MG/1
100 CAPSULE, LIQUID FILLED ORAL 2 TIMES DAILY PRN
Status: DISCONTINUED | OUTPATIENT
Start: 2020-05-13 | End: 2020-05-14 | Stop reason: HOSPADM

## 2020-05-13 RX ORDER — ONDANSETRON 2 MG/ML
4 INJECTION INTRAMUSCULAR; INTRAVENOUS AS NEEDED
Status: DISCONTINUED | OUTPATIENT
Start: 2020-05-13 | End: 2020-05-13 | Stop reason: HOSPADM

## 2020-05-13 RX ORDER — CEFAZOLIN SODIUM 2 G/50ML
2 SOLUTION INTRAVENOUS ONCE
Status: COMPLETED | OUTPATIENT
Start: 2020-05-13 | End: 2020-05-13

## 2020-05-13 RX ORDER — ACETAMINOPHEN 500 MG
1000 TABLET ORAL ONCE
Status: DISCONTINUED | OUTPATIENT
Start: 2020-05-13 | End: 2020-05-13 | Stop reason: HOSPADM

## 2020-05-13 RX ORDER — FLUMAZENIL 0.1 MG/ML
0.2 INJECTION INTRAVENOUS AS NEEDED
Status: DISCONTINUED | OUTPATIENT
Start: 2020-05-13 | End: 2020-05-13 | Stop reason: HOSPADM

## 2020-05-13 RX ORDER — SODIUM CHLORIDE 0.9 % (FLUSH) 0.9 %
10 SYRINGE (ML) INJECTION AS NEEDED
Status: DISCONTINUED | OUTPATIENT
Start: 2020-05-13 | End: 2020-05-13 | Stop reason: HOSPADM

## 2020-05-13 RX ORDER — LIDOCAINE HYDROCHLORIDE 10 MG/ML
0.5 INJECTION, SOLUTION EPIDURAL; INFILTRATION; INTRACAUDAL; PERINEURAL ONCE AS NEEDED
Status: DISCONTINUED | OUTPATIENT
Start: 2020-05-13 | End: 2020-05-13 | Stop reason: HOSPADM

## 2020-05-13 RX ORDER — SODIUM CHLORIDE 0.9 % (FLUSH) 0.9 %
3 SYRINGE (ML) INJECTION EVERY 12 HOURS SCHEDULED
Status: DISCONTINUED | OUTPATIENT
Start: 2020-05-13 | End: 2020-05-13 | Stop reason: HOSPADM

## 2020-05-13 RX ORDER — METOPROLOL SUCCINATE 50 MG/1
50 TABLET, EXTENDED RELEASE ORAL
Status: DISCONTINUED | OUTPATIENT
Start: 2020-05-14 | End: 2020-05-14 | Stop reason: HOSPADM

## 2020-05-13 RX ORDER — SODIUM CHLORIDE, SODIUM LACTATE, POTASSIUM CHLORIDE, CALCIUM CHLORIDE 600; 310; 30; 20 MG/100ML; MG/100ML; MG/100ML; MG/100ML
100 INJECTION, SOLUTION INTRAVENOUS CONTINUOUS
Status: DISCONTINUED | OUTPATIENT
Start: 2020-05-13 | End: 2020-05-13

## 2020-05-13 RX ORDER — OXYCODONE HYDROCHLORIDE AND ACETAMINOPHEN 5; 325 MG/1; MG/1
1 TABLET ORAL ONCE AS NEEDED
Status: DISCONTINUED | OUTPATIENT
Start: 2020-05-13 | End: 2020-05-13 | Stop reason: HOSPADM

## 2020-05-13 RX ORDER — DEXAMETHASONE SODIUM PHOSPHATE 4 MG/ML
4 INJECTION, SOLUTION INTRA-ARTICULAR; INTRALESIONAL; INTRAMUSCULAR; INTRAVENOUS; SOFT TISSUE ONCE AS NEEDED
Status: COMPLETED | OUTPATIENT
Start: 2020-05-13 | End: 2020-05-13

## 2020-05-13 RX ORDER — KETOROLAC TROMETHAMINE 15 MG/ML
15 INJECTION, SOLUTION INTRAMUSCULAR; INTRAVENOUS EVERY 6 HOURS SCHEDULED
Status: COMPLETED | OUTPATIENT
Start: 2020-05-13 | End: 2020-05-14

## 2020-05-13 RX ORDER — SODIUM CHLORIDE, SODIUM LACTATE, POTASSIUM CHLORIDE, CALCIUM CHLORIDE 600; 310; 30; 20 MG/100ML; MG/100ML; MG/100ML; MG/100ML
1000 INJECTION, SOLUTION INTRAVENOUS CONTINUOUS
Status: DISCONTINUED | OUTPATIENT
Start: 2020-05-13 | End: 2020-05-13

## 2020-05-13 RX ORDER — ONDANSETRON 2 MG/ML
INJECTION INTRAMUSCULAR; INTRAVENOUS AS NEEDED
Status: DISCONTINUED | OUTPATIENT
Start: 2020-05-13 | End: 2020-05-13 | Stop reason: SURG

## 2020-05-13 RX ORDER — POLYETHYLENE GLYCOL 3350 17 G/17G
17 POWDER, FOR SOLUTION ORAL DAILY PRN
COMMUNITY

## 2020-05-13 RX ORDER — PROPOFOL 10 MG/ML
VIAL (ML) INTRAVENOUS AS NEEDED
Status: DISCONTINUED | OUTPATIENT
Start: 2020-05-13 | End: 2020-05-13 | Stop reason: SURG

## 2020-05-13 RX ORDER — NALOXONE HCL 0.4 MG/ML
0.04 VIAL (ML) INJECTION AS NEEDED
Status: DISCONTINUED | OUTPATIENT
Start: 2020-05-13 | End: 2020-05-13 | Stop reason: HOSPADM

## 2020-05-13 RX ORDER — OXYCODONE AND ACETAMINOPHEN 7.5; 325 MG/1; MG/1
1 TABLET ORAL ONCE AS NEEDED
Status: DISCONTINUED | OUTPATIENT
Start: 2020-05-13 | End: 2020-05-13 | Stop reason: HOSPADM

## 2020-05-13 RX ORDER — MIDAZOLAM HYDROCHLORIDE 1 MG/ML
2 INJECTION INTRAMUSCULAR; INTRAVENOUS
Status: DISCONTINUED | OUTPATIENT
Start: 2020-05-13 | End: 2020-05-13 | Stop reason: HOSPADM

## 2020-05-13 RX ORDER — MAGNESIUM HYDROXIDE 1200 MG/15ML
LIQUID ORAL AS NEEDED
Status: DISCONTINUED | OUTPATIENT
Start: 2020-05-13 | End: 2020-05-13 | Stop reason: HOSPADM

## 2020-05-13 RX ORDER — SODIUM CHLORIDE 0.9 % (FLUSH) 0.9 %
3-10 SYRINGE (ML) INJECTION AS NEEDED
Status: DISCONTINUED | OUTPATIENT
Start: 2020-05-13 | End: 2020-05-13 | Stop reason: HOSPADM

## 2020-05-13 RX ORDER — HYDROMORPHONE HYDROCHLORIDE 1 MG/ML
0.25 INJECTION, SOLUTION INTRAMUSCULAR; INTRAVENOUS; SUBCUTANEOUS
Status: DISCONTINUED | OUTPATIENT
Start: 2020-05-13 | End: 2020-05-13 | Stop reason: HOSPADM

## 2020-05-13 RX ORDER — ROCURONIUM BROMIDE 10 MG/ML
INJECTION, SOLUTION INTRAVENOUS AS NEEDED
Status: DISCONTINUED | OUTPATIENT
Start: 2020-05-13 | End: 2020-05-13 | Stop reason: SURG

## 2020-05-13 RX ORDER — BISACODYL 10 MG
10 SUPPOSITORY, RECTAL RECTAL DAILY PRN
Status: DISCONTINUED | OUTPATIENT
Start: 2020-05-13 | End: 2020-05-14 | Stop reason: HOSPADM

## 2020-05-13 RX ORDER — FENTANYL CITRATE 50 UG/ML
25 INJECTION, SOLUTION INTRAMUSCULAR; INTRAVENOUS
Status: DISCONTINUED | OUTPATIENT
Start: 2020-05-13 | End: 2020-05-13 | Stop reason: HOSPADM

## 2020-05-13 RX ORDER — GLYCOPYRROLATE 0.2 MG/ML
INJECTION INTRAMUSCULAR; INTRAVENOUS AS NEEDED
Status: DISCONTINUED | OUTPATIENT
Start: 2020-05-13 | End: 2020-05-13 | Stop reason: SURG

## 2020-05-13 RX ORDER — DEXAMETHASONE SODIUM PHOSPHATE 4 MG/ML
INJECTION, SOLUTION INTRA-ARTICULAR; INTRALESIONAL; INTRAMUSCULAR; INTRAVENOUS; SOFT TISSUE AS NEEDED
Status: DISCONTINUED | OUTPATIENT
Start: 2020-05-13 | End: 2020-05-13 | Stop reason: SURG

## 2020-05-13 RX ORDER — LABETALOL HYDROCHLORIDE 5 MG/ML
5 INJECTION, SOLUTION INTRAVENOUS
Status: DISCONTINUED | OUTPATIENT
Start: 2020-05-13 | End: 2020-05-13 | Stop reason: HOSPADM

## 2020-05-13 RX ADMIN — HYDROMORPHONE HYDROCHLORIDE 450 MCG: 1 INJECTION, SOLUTION INTRAMUSCULAR; INTRAVENOUS; SUBCUTANEOUS at 14:31

## 2020-05-13 RX ADMIN — NEOSTIGMINE METHYLSULFATE 3 MG: 1 INJECTION INTRAVENOUS at 14:31

## 2020-05-13 RX ADMIN — ROCURONIUM BROMIDE 50 MG: 10 INJECTION INTRAVENOUS at 11:11

## 2020-05-13 RX ADMIN — PROPOFOL 150 MG: 10 INJECTION, EMULSION INTRAVENOUS at 11:17

## 2020-05-13 RX ADMIN — KETOROLAC TROMETHAMINE 15 MG: 15 INJECTION, SOLUTION INTRAMUSCULAR; INTRAVENOUS at 18:29

## 2020-05-13 RX ADMIN — Medication 240 MCG: at 12:00

## 2020-05-13 RX ADMIN — SODIUM CHLORIDE, POTASSIUM CHLORIDE, SODIUM LACTATE AND CALCIUM CHLORIDE 1000 ML: 600; 310; 30; 20 INJECTION, SOLUTION INTRAVENOUS at 10:26

## 2020-05-13 RX ADMIN — SUFENTANIL CITRATE 10 MCG: 50 INJECTION EPIDURAL; INTRAVENOUS at 11:17

## 2020-05-13 RX ADMIN — DEXAMETHASONE SODIUM PHOSPHATE 4 MG: 4 INJECTION, SOLUTION INTRAMUSCULAR; INTRAVENOUS at 14:31

## 2020-05-13 RX ADMIN — LIDOCAINE HYDROCHLORIDE 100 MG: 20 INJECTION, SOLUTION INFILTRATION; PERINEURAL at 11:17

## 2020-05-13 RX ADMIN — SODIUM CHLORIDE, POTASSIUM CHLORIDE, SODIUM LACTATE AND CALCIUM CHLORIDE: 600; 310; 30; 20 INJECTION, SOLUTION INTRAVENOUS at 14:28

## 2020-05-13 RX ADMIN — Medication 160 MCG: at 12:07

## 2020-05-13 RX ADMIN — BUPIVACAINE HYDROCHLORIDE 2 ML: 5 INJECTION, SOLUTION PERINEURAL at 11:20

## 2020-05-13 RX ADMIN — SODIUM CHLORIDE 150 ML/HR: 4.5 INJECTION, SOLUTION INTRAVENOUS at 18:29

## 2020-05-13 RX ADMIN — VASOPRESSIN 1 ML: 20 INJECTION INTRAVENOUS at 13:12

## 2020-05-13 RX ADMIN — VASOPRESSIN 1 ML: 20 INJECTION INTRAVENOUS at 12:49

## 2020-05-13 RX ADMIN — CEFAZOLIN SODIUM 2 G: 2 SOLUTION INTRAVENOUS at 11:00

## 2020-05-13 RX ADMIN — SODIUM CHLORIDE, POTASSIUM CHLORIDE, SODIUM LACTATE AND CALCIUM CHLORIDE 1000 ML: 600; 310; 30; 20 INJECTION, SOLUTION INTRAVENOUS at 10:28

## 2020-05-13 RX ADMIN — HYDROMORPHONE HYDROCHLORIDE 450 MCG: 1 INJECTION, SOLUTION INTRAMUSCULAR; INTRAVENOUS; SUBCUTANEOUS at 14:24

## 2020-05-13 RX ADMIN — Medication 400 MCG: at 11:22

## 2020-05-13 RX ADMIN — DEXAMETHASONE SODIUM PHOSPHATE 4 MG: 4 INJECTION, SOLUTION INTRAMUSCULAR; INTRAVENOUS at 11:01

## 2020-05-13 RX ADMIN — GLYCOPYRROLATE 0.3 MG: 0.2 INJECTION, SOLUTION INTRAMUSCULAR; INTRAVENOUS at 14:31

## 2020-05-13 RX ADMIN — ONDANSETRON HYDROCHLORIDE 4 MG: 2 SOLUTION INTRAMUSCULAR; INTRAVENOUS at 14:31

## 2020-05-13 RX ADMIN — HYDROMORPHONE HYDROCHLORIDE 100 MCG: 1 INJECTION, SOLUTION INTRAMUSCULAR; INTRAVENOUS; SUBCUTANEOUS at 11:13

## 2020-05-13 RX ADMIN — SUFENTANIL CITRATE 10 MCG: 50 INJECTION EPIDURAL; INTRAVENOUS at 11:12

## 2020-05-13 RX ADMIN — SODIUM CHLORIDE, POTASSIUM CHLORIDE, SODIUM LACTATE AND CALCIUM CHLORIDE: 600; 310; 30; 20 INJECTION, SOLUTION INTRAVENOUS at 14:43

## 2020-05-13 NOTE — PERIOPERATIVE NURSING NOTE
A-line left radial in place upon arrival from OR to PACU, transparent dressing dry and intact, site clear, leveled, zeroed, connections tight, arm board.

## 2020-05-13 NOTE — ANESTHESIA PROCEDURE NOTES
Spinal Block      Patient location during procedure: OB  Start Time: 5/13/2020 11:20 AM  Performed By  CRNA: Neri Milian CRNA  Preanesthetic Checklist  Completed: patient identified, site marked, surgical consent, pre-op evaluation, timeout performed, IV checked, risks and benefits discussed and monitors and equipment checked  Spinal Block Prep:  Patient Position:sitting  Sterile Tech:cap, gloves, mask and sterile barriers  Prep:Chloraprep  Patient Monitoring:blood pressure monitoring, continuous pulse oximetry and EKG  Spinal Block Procedure  Approach:midline  Guidance:palpation technique  Location:L3-L4  Needle Type:Sprotte  Needle Gauge:25 G  Placement of Spinal needle event:cerebrospinal fluid aspirated  Paresthesia: no  Fluid Appearance:clear  Medications: bupivacaine (MARCAINE) injection 0.5%, 2 mL  Med Administered at 5/13/2020 11:20 AM   Post Assessment  Patient Tolerance:patient tolerated the procedure well with no apparent complications  Complications no  Additional Notes  100 cecile  Dilaudid added to SAB

## 2020-05-13 NOTE — H&P
"Urology H&P    Ms. Awad is 74 y.o. female    CHIEF COMPLAINT: \" You are taking out my kidney\"    HPI  Patient has a right renal solid, enhancing mass.  This was identified during evaluation for left flank pain.  She denies right flank pain or hematuria.  No weight loss.  CT findings are consistent with renal cell carcinoma.  Nephrectomy is been recommended although options of biopsy with cryotherapy or biopsy with possible observation has been discussed.    The following portions of the patient's history were reviewed and updated as appropriate: allergies, current medications, past family history, past medical history, past social history, past surgical history and problem list.    Review of Systems   Genitourinary: Positive for flank pain (occasional left).   All other systems reviewed and are negative.      Medications Prior to Admission   Medication Sig Dispense Refill Last Dose   • ferrous sulfate 325 (65 FE) MG tablet Take 325 mg by mouth Daily With Breakfast.   5/11/2020 at Unknown time   • Garlic 1000 MG capsule Take  by mouth Daily.   Past Week at Unknown time   • hydrochlorothiazide (HYDRODIURIL) 12.5 MG tablet Take 12.5 mg by mouth Daily.   5/12/2020 at Unknown time   • METOPROLOL SUCCINATE ER PO Take 25 mg by mouth 2 (Two) Times a Day.   5/13/2020 at Unknown time   • NIFEdipine XL (PROCARDIA XL) 60 MG 24 hr tablet Take 60 mg by mouth Daily.   5/12/2020 at 0800   • polyethylene glycol (MIRALAX) packet Take 17 g by mouth Daily As Needed.      • quinapril (ACCUPRIL) 20 MG tablet Take 20 mg by mouth 2 (Two) Times a Day.   5/12/2020 at 0800   • simvastatin (ZOCOR) 40 MG tablet Take 40 mg by mouth Every Night.   5/12/2020 at 0800   • vitamin B-12 (CYANOCOBALAMIN) 500 MCG tablet Take 500 mcg by mouth Every Other Day.   Past Week at Unknown time   • aspirin 81 MG EC tablet Take 81 mg by mouth Daily.   5/5/2020   • calcium carbonate (OS-EMILIANA) 600 MG tablet Take 600 mg by mouth Daily.   5/11/2020   • " colchicine-probenecid (COL-BENEMID) 0.5-500 MG tablet Take 1 tablet by mouth Daily.   More than a month at Unknown time   • meclizine (ANTIVERT) 25 MG tablet Take 25 mg by mouth 3 (Three) Times a Day As Needed for dizziness.   More than a month at Unknown time         Current Facility-Administered Medications:   •  ceFAZolin Sodium-Dextrose (ANCEF) IVPB (duplex) 2 g, 2 g, Intravenous, Once, Quinton Rainey MD  •  lactated ringers infusion 1,000 mL, 1,000 mL, Intravenous, Continuous, Quinton Rainey MD, Last Rate: 25 mL/hr at 05/13/20 1026, 1,000 mL at 05/13/20 1026  •  lactated ringers infusion 1,000 mL, 1,000 mL, Intravenous, Continuous, Quinton Rainey MD  •  sodium chloride 0.9 % flush 10 mL, 10 mL, Intravenous, PRN, Quinton Rainey MD  •  sodium chloride 0.9 % flush 10 mL, 10 mL, Intravenous, PRN, Quinton Rainey MD    Past Medical History:   Diagnosis Date   • Anemia    • Brain bleed (CMS/HCC)     TOOK SHOTS IN EYES    • Cancer of kidney (CMS/HCC)    • Cataract    • Colon polyp    • Constipation    • Elevated cholesterol    • Gout    • Hyperlipidemia    • Hypertension    • Incontinence    • Insomnia    • Nocturia    • SCC (squamous cell carcinoma), scalp/neck     right neck       Past Surgical History:   Procedure Laterality Date   • COLONOSCOPY     • HYSTERECTOMY     • JOINT REPLACEMENT Right    • KNEE SURGERY Right        Social History     Socioeconomic History   • Marital status: Legally      Spouse name: Not on file   • Number of children: Not on file   • Years of education: Not on file   • Highest education level: Not on file   Tobacco Use   • Smoking status: Never Smoker   • Smokeless tobacco: Never Used   Substance and Sexual Activity   • Alcohol use: No   • Drug use: No   • Sexual activity: Defer       Family History   Problem Relation Age of Onset   • Heart disease Other    • Diabetes Other    • Hypertension Other    • Obesity Other    • Kidney disease Other        BP (!) 183/85 (BP  Location: Left arm, Patient Position: Sitting)   Pulse 59   Temp 97.1 °F (36.2 °C) (Temporal)   SpO2 97%   Breastfeeding No     Physical Exam  Constitutional:  They  appear well-developed and well-nourished. There are no obvious deformities. No distress. The vital signs are reviewed  Pulmonary/Chest: Effort normal.   GI: Soft. The patient exhibits no distension and no mass. There is no tenderness. There is no rebound and no guarding. No hernia.   Neurological: Patient is alert and oriented to person, place, and time.   Skin: Skin is warm and dry. Not diaphoretic.   Psychiatric:  normal mood and affect. Not agitated.       Lab Results   Component Value Date    GLUCOSE 107 (H) 05/11/2020    BUN 22 05/11/2020    CREATININE 0.93 05/11/2020    EGFRIFNONA 59 (L) 05/11/2020    EGFRIFAFRI >60.0 02/27/2020    BCR 23.7 05/11/2020    CO2 28.0 05/11/2020    CALCIUM 9.8 05/11/2020    ALBUMIN 4.20 05/11/2020    AST 17 05/11/2020    ALT 12 05/11/2020     Lab Results   Component Value Date    GLUCOSE 107 (H) 05/11/2020    CALCIUM 9.8 05/11/2020     (L) 05/11/2020    K 4.2 05/11/2020    CO2 28.0 05/11/2020    CL 97 (L) 05/11/2020    BUN 22 05/11/2020    CREATININE 0.93 05/11/2020    EGFRIFAFRI >60.0 02/27/2020    EGFRIFNONA 59 (L) 05/11/2020    BCR 23.7 05/11/2020    ANIONGAP 8.0 05/11/2020     Lab Results   Component Value Date    WBC 6.07 05/11/2020    HGB 13.1 05/11/2020    HCT 38.9 05/11/2020    MCV 88.0 05/11/2020     05/11/2020     No results found for: PSA  No results found for: URINECX  Brief Urine Lab Results  (Last result in the past 365 days)      Color   Clarity   Blood   Leuk Est   Nitrite   Protein   CREAT   Urine HCG        03/26/20 1044 Yellow Clear Negative Small (1+) Negative Negative             MRI ABDOMEN W WO CONTRAST- 3/26/2020 10:11 AM CDT     HISTORY: Left-sided abdominal pain, abnormal CT of the kidneys     COMPARISON: CT abdomen and pelvis contrast 03/14/2020     Technical: Multiplanar,  multisequence imaging was performed through the  abdomen before and after the administration of IV contrast.     FINDINGS:  There is no loss of signal within the liver on opposed phase imaging to  suggest hepatic steatosis. Tiny cysts are seen in the liver. The liver  is otherwise unremarkable.     The gallbladder, spleen, and adrenal glands are unremarkable. Pancreas  is grossly normal in appearance.     Tiny cysts are seen in the left kidney. A T1 and T2 hypointense mass  arises from the medial aspect of the inferior right kidney which  measures 4.4 x 3.4 x 4.1 cm in size. On postcontrast imaging, there is  heterogeneous enhancement of the mass, compatible with a solid lesion  and concerning for renal cell carcinoma. No additional enhancing renal  lesions are identified.     A hiatal hernia is present.     IMPRESSION:  Solid, enhancing mass arising from the inferomedial right  kidney measuring up to 4.4 cm in size. This is most concerning for renal  cell carcinoma.  This report was finalized on 03/26/2020 10:16 by Dr. Yovany Jimenez MD.    CT ABDOMEN PELVIS W CONTRAST-  3/14/2020 8:42 PM CDT     HISTORY: Left-sided abdominal pain    TECHNIQUE: Following the intravenous administration of contrast, helical  CT tomographic images of the abdomen and pelvis were acquired. Coronal  reformatted images were also provided for review.      FINDINGS:   The lung bases and base of the heart are unremarkable.      LIVER: No focal liver lesion. The hepatic vasculature is patent.      BILIARY SYSTEM: The gallbladder is unremarkable. No intrahepatic or  extrahepatic ductal dilatation.      PANCREAS: No focal pancreatic lesion.      SPLEEN: Unremarkable.      KIDNEYS AND ADRENALS: Adrenal glands are visualized. Left renal contour  appears normal. There is a 4.2 cm nonuniform enhancing mass associated  with the lower pole of the right kidney. This may be a proteinaceous or  hemorrhagic cyst however a neoplasm cannot be excluded..  The ureters are  decompressed and normal in appearance.     RETROPERITONEUM: No mass, lymphadenopathy or hemorrhage.      GI TRACT: No evidence of obstruction or bowel wall thickening. The  appendix is visualized and unremarkable. Large hiatal hernia is present     OTHER: A few normal-sized mesenteric lymph nodes are present this is  nonspecific. Vascular calcifications present abdominal aorta and iliac  arteries.. Subchondral degenerative cyst is noted in the left femoral  head degenerative change in the facet joints is present L5-S1.  Subchondral degenerative cystic changes present right femoral head.     PELVIS: No mass lesion, fluid collection or significant lymphadenopathy  is seen in the pelvis. The urinary bladder is normal in appearance.     IMPRESSION:  1. 42 mm mass lower pole of the right kidney. This may be a  proteinaceous or hemorrhagic cyst however neoplasm cannot be excluded.  2. A few normal-sized mesenteric lymph nodes are present which is  nonspecific.  3. Large hiatal hernia        This report was finalized on 03/14/2020 20:51 by Dr. Dandre Archuleta MD.    Assessment and Plan  Right renal mass.  The inferior medial location of this mass limits the ability do partial nephrectomy.  This is discussed with the patient would proceed with radical not nephrectomy.  Contralateral kidney looks normal.  Her estimated glomerular filtration rate is 59.      (Please note that portions of this note were completed with a voice recognition program.)  Quinton Rainey MD  05/13/20  10:27

## 2020-05-13 NOTE — PLAN OF CARE
Problem: Patient Care Overview  Goal: Plan of Care Review  Outcome: Ongoing (interventions implemented as appropriate)  Flowsheets (Taken 5/13/2020 1813)  Progress: no change  Plan of Care Reviewed With: patient  Outcome Summary: VSS. c/o pain. incisions CDI. IVF continued. A&O. Youssef draining. continue to monitor.  Goal: Individualization and Mutuality  Outcome: Ongoing (interventions implemented as appropriate)  Goal: Discharge Needs Assessment  Outcome: Ongoing (interventions implemented as appropriate)  Goal: Interprofessional Rounds/Family Conf  Outcome: Ongoing (interventions implemented as appropriate)     Problem: Fall Risk (Adult)  Goal: Identify Related Risk Factors and Signs and Symptoms  Outcome: Ongoing (interventions implemented as appropriate)  Goal: Absence of Fall  Outcome: Ongoing (interventions implemented as appropriate)     Problem: Pain, Acute (Adult)  Goal: Identify Related Risk Factors and Signs and Symptoms  Outcome: Ongoing (interventions implemented as appropriate)  Goal: Acceptable Pain Control/Comfort Level  Outcome: Ongoing (interventions implemented as appropriate)

## 2020-05-13 NOTE — OP NOTE
Operative Summary    Neela Awad  Date of Procedure: 5/13/2020    Pre-op Diagnosis:   Right renal mass [N28.89]    Post-op Diagnosis:     Post-Op Diagnosis Codes:     * Right renal mass [N28.89]    Procedure/CPT® Codes:      Procedure(s):  RIGHT NEPHRECTOMY LAPAROSCOPIC WITH DAVINCI ROBOT    Surgeon(s):  Quinton Rainey MD    Anesthesia: General    Staff:   Circulator: Shahzad Matute RN  Scrub Person: Umer Samson; Ashleigh Ni; Miguel Askew; Meredith Chatman    Indications for procedure:  Enhancing right renal mass likely renal cell carcinoma    Findings:   Solid-appearing mass medially that was not invading the psoas muscle.  There is no evidence of any significant adenopathy.  2 renal veins were present inserting into the vena cava separately.  Patient had a single renal artery.    Procedure details:  After appropriate anesthesia, positioning, prep and drape, timeout protocol was observed.  For positioning the patient is placed in essentially a left lateral decubitus position but with the kidney rest raised in slight flexed table.  An axillary roll was placed.  Legs were padded appropriately over the sequential compression devices and the patient's right arm is supported by a Campos arm board.    The abdomen is inspected for previous incisions which shows low midline from previous hysterectomy.  Pneumoperitoneum is achieved using a Veress needle technique in the right mid abdomen just lateral to the midclavicular line.  CO2 insufflation was carried out to the level of 15 mmHg.  This is maintained throughout the case.    5 mm trocar with visual obturator is used in the right paramedian line midway between the umbilicus and xiphoid process.  So that I could visualize the layers of subcutaneous fat, anterior fascia, muscular layer, posterior fascia and then peritoneum.  The abdomen is then closely inspected.  There is no evidence of metastatic disease.  The liver itself appears minimally enlarged  "but is without mass. Adhesions are prominent in the left lower quadrant and then the omentum was stuck over the anterior surface of Gerota's fascia.  The duodenum of the course pulled taut over the vena cava..:  Small intestine are inspected and found to be free of any abnormality.  The visualized omentum and mesentery is without excessive adenopathy or significant mass.    Four 8 Angolan trochars are placed in the right paramedian area to the right of midline in a straight line.  This did require removal of the 5 Angolan port which is later placed as a liver retractor as described.  The robot is now docked in standard fashion.  At this point the 5 mm trocar which was removed for placement of the camera port is then placed a couple fingerbreadths lateral and 3 to 4 fingerbreadths inferior to the xiphoid process.      The ascending colon and hepatic flexure are then mobilized.  Posterior mesentery was  from the anterior surface of Gerota's fascia.  At this point Iwas able to identify the location of the mass as it was surrounded by Gerota's fascia.  After the colon was safely mobilized the duodenum is identified.  A Kocher maneuver was performed on the duodenum to expose the anterior surface of the vena cava.    The right gonadal vein is identified going into the anterior surface of the vena cava.  I was able to identify the gonadal vein below the lower pole of the kidney and traced this up to the cava.  The vena cava is exposed with an anterior \"split and roll \"technique using the robotic instruments below the lower pole of the kidney and up to the upper pole of the kidney.  There was no evidence of a caval thrombus.  I was able to identify the 2 right renal veins with separate insertions into the vena cava..  No evidence of thrombus.  At this point I dissected out both of these.  Once I had mobilized this circumferentially I could retracted anteriorly until the renal arterial supply could be identified.  " There was a single renal artery with an early posterior branch just lateral to the vena cava.  This could be located between the 2 renal veins.  I was able to put a vascular white load on the artery.. The veins are both divided with a single staple line from the excised stapler with white load..     Now that the blood flow been interrupted to the kidney I mobilized the inferior medial pole as well as the inferior pole posterior aspect of the kidney itself.  The ureter is divided by placing hemo-lock clips on it and dividing it.  The lateral inferior aspect of the kidney was then mobilized.  I retracted this anteriorly with the fourth arm of the robot with a pro-grasp forceps.  This let me carry out the lateral and posterior dissections above the level of the hilum.  I then turned my attention to the upper medial portion of the kidney which is mobilized off the cava.  Both bipolar monopolar energy are used throughout this dissection with PK forceps and monopolar scissors.  Care was taken to avoid cautery around bile.  The remaining portion of the kidney was the upper pole which is then dissected.  The adrenal gland is spared as there was no evidence of mass in the lower pole the kidney was involved..  At this point I was able to remove the kidney so I placed it on top of the liver.  I removed the arm for trocar and placed a large laparoscopic specimen removal bag.  I inserted the specimen into this and closed it and pulled the string out the right lower quadrant incision which would later be extended to remove the entire device.  I drop the CO2 insufflation pressure to 8 mmHg and saw no active evidence of bleeding.  This is then splayed over the hilum and the underside of the liver which showed no evidence of damage.  Hemostasis was good.    Robot is undocked in standard fashion.  Right lower quadrant incision is extended through the external/internal Bleich musculature as well as transversalis fascia and muscle  which allowed me to enter the peritoneum and remove the specimen intact and inside the laparoscopic bag.  After its removal the musculature layers are closed with 0 PDS suture and subcutaneous tissues irrigated.  The 12 mm trocar sites, both the camera port in the air seal site, are closed with the previously placed 0 Vicryl sutures which were done with a Adan Mendoza needle under direct visualization.   Deep 3-0 chromic sutures were placed to approximate Aysha's fascia and subcutaneous fat.  Skin-o-fix was then placed over each of the incisions.        Estimated Blood Loss: 100 mL    Specimens:                Specimens     ID Source Type Tests Collected By Collected At Frozen?      A Kidney, Right Tissue · TISSUE PATHOLOGY EXAM   Quinton Rainey MD 5/13/20 1216 No     Description: Right Kidney            Drains: 16 Stateless Youssef catheter    Complications: none    Plan: Patient be admitted to [facility].      (Please note that portions of this note were completed with a voice recognition program.)  Quinton Rainey MD     Date: 5/13/2020  Time: 14:59

## 2020-05-13 NOTE — PERIOPERATIVE NURSING NOTE
Dr Keita at bedside, aware of oral temp of 95.9, Temporal temp 98.3 this reading is above admission baseline of 97.1 Temporal this am. Dr Keita ok'd to move to floor.

## 2020-05-13 NOTE — ANESTHESIA PREPROCEDURE EVALUATION
Anesthesia Evaluation     Patient summary reviewed   no history of anesthetic complications:  NPO Solid Status: > 8 hours  NPO Liquid Status: > 4 hours           Airway   Mallampati: III  TM distance: >3 FB  Neck ROM: full  Dental          Pulmonary - normal exam    breath sounds clear to auscultation  (-) asthma, recent URI, sleep apnea, not a smoker  Cardiovascular   Exercise tolerance: good (4-7 METS)    ECG reviewed  Rhythm: regular  Rate: normal    (+) hypertension, hyperlipidemia,   (-) pacemaker, past MI, angina, cardiac stents      Neuro/Psych  (-) seizures, TIA, CVA  GI/Hepatic/Renal/Endo    (+)  GERD,  renal disease (Right renal cancer),   (-) liver disease, diabetes, no thyroid disorder    Musculoskeletal     Abdominal    Substance History      OB/GYN          Other      history of cancer                  Anesthesia Plan    ASA 3     general   (Preop arterial line)  intravenous induction     Anesthetic plan, all risks, benefits, and alternatives have been provided, discussed and informed consent has been obtained with: patient.

## 2020-05-14 ENCOUNTER — READMISSION MANAGEMENT (OUTPATIENT)
Dept: CALL CENTER | Facility: HOSPITAL | Age: 75
End: 2020-05-14

## 2020-05-14 VITALS
OXYGEN SATURATION: 96 % | TEMPERATURE: 97.5 F | HEART RATE: 68 BPM | WEIGHT: 138.67 LBS | HEIGHT: 61 IN | RESPIRATION RATE: 18 BRPM | SYSTOLIC BLOOD PRESSURE: 146 MMHG | DIASTOLIC BLOOD PRESSURE: 67 MMHG | BODY MASS INDEX: 26.18 KG/M2

## 2020-05-14 LAB
ALBUMIN SERPL-MCNC: 3.3 G/DL (ref 3.5–5.2)
ALBUMIN/GLOB SERPL: 1.1 G/DL
ALP SERPL-CCNC: 48 U/L (ref 39–117)
ALT SERPL W P-5'-P-CCNC: 40 U/L (ref 1–33)
ANION GAP SERPL CALCULATED.3IONS-SCNC: 14 MMOL/L (ref 5–15)
AST SERPL-CCNC: 47 U/L (ref 1–32)
BASOPHILS # BLD AUTO: 0.03 10*3/MM3 (ref 0–0.2)
BASOPHILS NFR BLD AUTO: 0.2 % (ref 0–1.5)
BILIRUB SERPL-MCNC: 0.2 MG/DL (ref 0.2–1.2)
BUN BLD-MCNC: 20 MG/DL (ref 8–23)
BUN/CREAT SERPL: 12.4 (ref 7–25)
CALCIUM SPEC-SCNC: 8.7 MG/DL (ref 8.6–10.5)
CHLORIDE SERPL-SCNC: 99 MMOL/L (ref 98–107)
CO2 SERPL-SCNC: 19 MMOL/L (ref 22–29)
CREAT BLD-MCNC: 1.61 MG/DL (ref 0.57–1)
DEPRECATED RDW RBC AUTO: 41.1 FL (ref 37–54)
EOSINOPHIL # BLD AUTO: 0 10*3/MM3 (ref 0–0.4)
EOSINOPHIL NFR BLD AUTO: 0 % (ref 0.3–6.2)
ERYTHROCYTE [DISTWIDTH] IN BLOOD BY AUTOMATED COUNT: 13.2 % (ref 12.3–15.4)
GFR SERPL CREATININE-BSD FRML MDRD: 31 ML/MIN/1.73
GLOBULIN UR ELPH-MCNC: 2.9 GM/DL
GLUCOSE BLD-MCNC: 135 MG/DL (ref 65–99)
HCT VFR BLD AUTO: 34.4 % (ref 34–46.6)
HGB BLD-MCNC: 12 G/DL (ref 12–15.9)
IMM GRANULOCYTES # BLD AUTO: 0.06 10*3/MM3 (ref 0–0.05)
IMM GRANULOCYTES NFR BLD AUTO: 0.5 % (ref 0–0.5)
LYMPHOCYTES # BLD AUTO: 0.33 10*3/MM3 (ref 0.7–3.1)
LYMPHOCYTES NFR BLD AUTO: 2.5 % (ref 19.6–45.3)
MCH RBC QN AUTO: 30 PG (ref 26.6–33)
MCHC RBC AUTO-ENTMCNC: 34.9 G/DL (ref 31.5–35.7)
MCV RBC AUTO: 86 FL (ref 79–97)
MONOCYTES # BLD AUTO: 1.2 10*3/MM3 (ref 0.1–0.9)
MONOCYTES NFR BLD AUTO: 9.1 % (ref 5–12)
NEUTROPHILS # BLD AUTO: 11.58 10*3/MM3 (ref 1.7–7)
NEUTROPHILS NFR BLD AUTO: 87.7 % (ref 42.7–76)
NRBC BLD AUTO-RTO: 0 /100 WBC (ref 0–0.2)
PLATELET # BLD AUTO: 179 10*3/MM3 (ref 140–450)
PMV BLD AUTO: 10.5 FL (ref 6–12)
POTASSIUM BLD-SCNC: 4 MMOL/L (ref 3.5–5.2)
PROT SERPL-MCNC: 6.2 G/DL (ref 6–8.5)
RBC # BLD AUTO: 4 10*6/MM3 (ref 3.77–5.28)
SODIUM BLD-SCNC: 132 MMOL/L (ref 136–145)
WBC NRBC COR # BLD: 13.2 10*3/MM3 (ref 3.4–10.8)

## 2020-05-14 PROCEDURE — 85025 COMPLETE CBC W/AUTO DIFF WBC: CPT | Performed by: UROLOGY

## 2020-05-14 PROCEDURE — 80053 COMPREHEN METABOLIC PANEL: CPT | Performed by: UROLOGY

## 2020-05-14 PROCEDURE — 25010000002 KETOROLAC TROMETHAMINE PER 15 MG: Performed by: UROLOGY

## 2020-05-14 RX ORDER — NIFEDIPINE 60 MG/1
60 TABLET, EXTENDED RELEASE ORAL
Status: DISCONTINUED | OUTPATIENT
Start: 2020-05-14 | End: 2020-05-14 | Stop reason: HOSPADM

## 2020-05-14 RX ADMIN — SODIUM CHLORIDE 150 ML/HR: 4.5 INJECTION, SOLUTION INTRAVENOUS at 07:22

## 2020-05-14 RX ADMIN — KETOROLAC TROMETHAMINE 15 MG: 15 INJECTION, SOLUTION INTRAMUSCULAR; INTRAVENOUS at 00:01

## 2020-05-14 RX ADMIN — METOPROLOL SUCCINATE 50 MG: 50 TABLET, EXTENDED RELEASE ORAL at 10:10

## 2020-05-14 RX ADMIN — KETOROLAC TROMETHAMINE 15 MG: 15 INJECTION, SOLUTION INTRAMUSCULAR; INTRAVENOUS at 05:09

## 2020-05-14 RX ADMIN — KETOROLAC TROMETHAMINE 15 MG: 15 INJECTION, SOLUTION INTRAMUSCULAR; INTRAVENOUS at 11:40

## 2020-05-14 RX ADMIN — NIFEDIPINE 60 MG: 60 TABLET, FILM COATED, EXTENDED RELEASE ORAL at 10:11

## 2020-05-14 NOTE — PROGRESS NOTES
Discharge Planning Assessment  [facility]     Patient Name: Neela Awad  MRN: [id number]  Today's Date: 5/14/2020    Admit Date: 5/13/2020    Discharge Needs Assessment     Row Name 05/14/20 1351       Living Environment    Lives With  child(vic), adult    Name(s) of Who Lives With Patient  Miquel Awad    Current Living Arrangements  home/apartment/condo    Primary Care Provided by  self    Provides Primary Care For  no one    Family Caregiver if Needed  child(vic), adult    Quality of Family Relationships  helpful;involved;supportive    Able to Return to Prior Arrangements  yes       Resource/Environmental Concerns    Resource/Environmental Concerns  none       Transition Planning    Patient/Family Anticipates Transition to  home with family    Patient/Family Anticipated Services at Transition  none    Transportation Anticipated  family or friend will provide       Discharge Needs Assessment    Readmission Within the Last 30 Days  no previous admission in last 30 days    Concerns to be Addressed  no discharge needs identified    Equipment Currently Used at Home  cane, straight;commode    Anticipated Changes Related to Illness  none    Equipment Needed After Discharge  none    Discharge Coordination/Progress  Pt lives at home and will return home at d/c. She has been independent and has rx coverage. No needs identified at this time but will follow in case d/c needs arise.         Discharge Plan    No documentation.       Destination      Coordination has not been started for this encounter.      Durable Medical Equipment      Coordination has not been started for this encounter.      Dialysis/Infusion      Coordination has not been started for this encounter.      Home Medical Care      Coordination has not been started for this encounter.      Therapy      Coordination has not been started for this encounter.      Community Resources      Coordination has not been started for this encounter.          Demographic  Summary    No documentation.       Functional Status    No documentation.       Psychosocial    No documentation.       Abuse/Neglect    No documentation.       Legal    No documentation.       Substance Abuse    No documentation.       Patient Forms    No documentation.           HAYDER Montesinos

## 2020-05-14 NOTE — DISCHARGE SUMMARY
Date of Discharge:  5/15/2020    Discharge Diagnosis:   1.  Right renal mass suspicious for renal cell carcinoma  2.  Hypertension    Presenting Problem/History of Present Illness  Right renal mass [N28.89]  Right renal mass [N28.89]       Hospital Course  Patient is a 74 y.o. female presented with right renal mass worrisome for renal cell carcinoma.  She underwent a robot-assisted laparoscopic radical nephrectomy.  Postoperatively was managed on a MedSur unit.  Catheter removed postoperative day 1 she was able to void on her own.  Her hemoglobin was reasonable following the procedure.  She is tolerating regular diet and able to tolerate pain on oral analgesics.  She was discharged on postoperative day 1..      Procedures Performed  Procedure(s):  NEPHRECTOMY LAPAROSCOPIC WITH DAVINCI ROBOT       Consults:   Consults     No orders found from 4/14/2020 to 5/14/2020.          Condition on Discharge: Good    Vital Signs         Discharge Disposition  Home or Self Care    Discharge Medications     Discharge Medications      New Medications      Instructions Start Date   HYDROcodone-acetaminophen 7.5-325 MG per tablet  Commonly known as:  Norco   1 tablet, Oral, Every 6 Hours PRN         Continue These Medications      Instructions Start Date   aspirin 81 MG EC tablet   81 mg, Oral, Daily      calcium carbonate 600 MG tablet  Commonly known as:  OS-EMILIANA   600 mg, Oral, Daily      colchicine-probenecid 0.5-500 MG per tablet  Commonly known as:  COL-BENEMID   1 tablet, Oral, Daily      ferrous sulfate 325 (65 FE) MG tablet   325 mg, Oral, Daily With Breakfast      Garlic 1000 MG capsule   Oral, Daily      hydroCHLOROthiazide 12.5 MG tablet  Commonly known as:  HYDRODIURIL   12.5 mg, Oral, Daily      meclizine 25 MG tablet  Commonly known as:  ANTIVERT   25 mg, Oral, 3 Times Daily PRN      METOPROLOL SUCCINATE ER PO   25 mg, Oral, 2 Times Daily      NIFEdipine XL 60 MG 24 hr tablet  Commonly known as:  PROCARDIA XL   60  mg, Oral, Daily      polyethylene glycol packet  Commonly known as:  MIRALAX   17 g, Oral, Daily PRN      quinapril 20 MG tablet  Commonly known as:  ACCUPRIL   20 mg, Oral, 2 Times Daily      simvastatin 40 MG tablet  Commonly known as:  ZOCOR   40 mg, Oral, Nightly      vitamin B-12 500 MCG tablet  Commonly known as:  CYANOCOBALAMIN   500 mcg, Oral, Every Other Day             Discharge Diet:   Diet Instructions     Diet: Regular      Discharge Diet:  Regular          Activity at Discharge:   Activity Instructions     Driving Restrictions      Type of Restriction:  Driving    Driving Restrictions:  No Driving (Time Limited)    Length:  3 Weeks    Lifting Restrictions      Type of Restriction:  Lifting    Lifting Restrictions:  Lifting Restriction (Indicate Limit)    Weight Limit (Pounds):  10    Length of Lifting Restriction:  4 weeks    Work Restrictions      Type of Restriction:  Work    May Return to Work:  Specific Date    Return To Work Date:  6/15/2020    With / Without Restrictions:  Without Restrictions          Follow-up Appointments  Future Appointments   Date Time Provider Department Center   5/21/2020  8:40 AM Quinton Rainey MD [facility] None   6/22/2020 11:00 AM Michael Loyola MD [facility] None     Additional Instructions for the Follow-ups that You Need to Schedule     Discharge Follow-up with Specified Provider: viviane; 1 Week   As directed      To:  viviane    Follow Up:  1 Week    Follow Up Details:  to discuss path and get lab work               Test Results Pending at Discharge   Order Current Status    Tissue Pathology Exam In process           Quinton Rainey MD  05/15/20  14:28    Time: Discharge 15 min

## 2020-05-14 NOTE — PAYOR COMM NOTE
"Neela Huffman (74 y.o. Female) case-[id number]    [facility] phone    Fax      Admit 5/13      Note pt had auth for April admit [id number]   Guessing surgery was canceled because of covid 19   ???      Date of Birth Social Security Number Address Home Phone MRN    1945  [id number]   [place] [address] [phone] [id number]    Sikhism Marital Status          [facility]        Admission Date Admission Type Admitting Provider Attending Provider Department, Room/Bed    5/13/20 Elective Quinton Rainey MD Spicer, Donald L, MD [facility] 3C, 371/1    Discharge Date Discharge Disposition Discharge Destination                       Attending Provider:  Quinton Rainey MD    Allergies:  Allopurinol    Isolation:  None   Infection:  None   Code Status:  CPR    Ht:  154.5 cm (60.83\")   Wt:  62.9 kg (138 lb 10.7 oz)    Admission Cmt:  None   Principal Problem:  Right renal mass [N28.89]                 Active Insurance as of 5/13/2020     Primary Coverage     Payor Plan Insurance Group Employer/Plan Group    ANTHEM MEDICARE REPLACEMENT ANTHEM MEDICARE ADVANTAGE [id number]     Payor Plan Address Payor Plan Phone Number Payor Plan Fax Number Effective Dates    [address] [phone]  1/1/2016 - None Entered    [facility] [id number]       Subscriber Name Subscriber Birth Date Member ID       NEELA HUFFMAN 1945 [id number]                 Emergency Contacts      (Rel.) Home Phone Work Phone Mobile Phone    Suzette Solano (Daughter) [phone] -- --    EARNESTTIMOTEO (Sister) -- -- [phone]    LYNDA HUFFMAN (Son) -- -- [phone]               History & Physical      Quinton Rainey MD at 05/13/20 1026          Urology H&P    Ms. Huffman is 74 y.o. female    CHIEF COMPLAINT: \" You are taking out my kidney\"    HPI  Patient has a right renal solid, enhancing mass.  This was identified during " evaluation for left flank pain.  She denies right flank pain or hematuria.  No weight loss.  CT findings are consistent with renal cell carcinoma.  Nephrectomy is been recommended although options of biopsy with cryotherapy or biopsy with possible observation has been discussed.    The following portions of the patient's history were reviewed and updated as appropriate: allergies, current medications, past family history, past medical history, past social history, past surgical history and problem list.    Review of Systems   Genitourinary: Positive for flank pain (occasional left).   All other systems reviewed and are negative.      Medications Prior to Admission   Medication Sig Dispense Refill Last Dose   • ferrous sulfate 325 (65 FE) MG tablet Take 325 mg by mouth Daily With Breakfast.   5/11/2020 at Unknown time   • Garlic 1000 MG capsule Take  by mouth Daily.   Past Week at Unknown time   • hydrochlorothiazide (HYDRODIURIL) 12.5 MG tablet Take 12.5 mg by mouth Daily.   5/12/2020 at Unknown time   • METOPROLOL SUCCINATE ER PO Take 25 mg by mouth 2 (Two) Times a Day.   5/13/2020 at Unknown time   • NIFEdipine XL (PROCARDIA XL) 60 MG 24 hr tablet Take 60 mg by mouth Daily.   5/12/2020 at 0800   • polyethylene glycol (MIRALAX) packet Take 17 g by mouth Daily As Needed.      • quinapril (ACCUPRIL) 20 MG tablet Take 20 mg by mouth 2 (Two) Times a Day.   5/12/2020 at 0800   • simvastatin (ZOCOR) 40 MG tablet Take 40 mg by mouth Every Night.   5/12/2020 at 0800   • vitamin B-12 (CYANOCOBALAMIN) 500 MCG tablet Take 500 mcg by mouth Every Other Day.   Past Week at Unknown time   • aspirin 81 MG EC tablet Take 81 mg by mouth Daily.   5/5/2020   • calcium carbonate (OS-EMILIANA) 600 MG tablet Take 600 mg by mouth Daily.   5/11/2020   • colchicine-probenecid (COL-BENEMID) 0.5-500 MG tablet Take 1 tablet by mouth Daily.   More than a month at Unknown time   • meclizine (ANTIVERT) 25 MG tablet Take 25 mg by mouth 3 (Three) Times  a Day As Needed for dizziness.   More than a month at Unknown time         Current Facility-Administered Medications:   •  ceFAZolin Sodium-Dextrose (ANCEF) IVPB (duplex) 2 g, 2 g, Intravenous, Once, Quinton Rainey MD  •  lactated ringers infusion 1,000 mL, 1,000 mL, Intravenous, Continuous, Quinton Rainey MD, Last Rate: 25 mL/hr at 05/13/20 1026, 1,000 mL at 05/13/20 1026  •  lactated ringers infusion 1,000 mL, 1,000 mL, Intravenous, Continuous, Quinton Rainey MD  •  sodium chloride 0.9 % flush 10 mL, 10 mL, Intravenous, PRN, Quinton Rainey MD  •  sodium chloride 0.9 % flush 10 mL, 10 mL, Intravenous, PRN, Quinton Rainey MD    Past Medical History:   Diagnosis Date   • Anemia    • Brain bleed (CMS/HCC)     TOOK SHOTS IN EYES    • Cancer of kidney (CMS/HCC)    • Cataract    • Colon polyp    • Constipation    • Elevated cholesterol    • Gout    • Hyperlipidemia    • Hypertension    • Incontinence    • Insomnia    • Nocturia    • SCC (squamous cell carcinoma), scalp/neck     right neck       Past Surgical History:   Procedure Laterality Date   • COLONOSCOPY     • HYSTERECTOMY     • JOINT REPLACEMENT Right    • KNEE SURGERY Right        Social History     Socioeconomic History   • Marital status: Legally      Spouse name: Not on file   • Number of children: Not on file   • Years of education: Not on file   • Highest education level: Not on file   Tobacco Use   • Smoking status: Never Smoker   • Smokeless tobacco: Never Used   Substance and Sexual Activity   • Alcohol use: No   • Drug use: No   • Sexual activity: Defer       Family History   Problem Relation Age of Onset   • Heart disease Other    • Diabetes Other    • Hypertension Other    • Obesity Other    • Kidney disease Other        BP (!) 183/85 (BP Location: Left arm, Patient Position: Sitting)   Pulse 59   Temp 97.1 °F (36.2 °C) (Temporal)   SpO2 97%   Breastfeeding No     Physical Exam  Constitutional:  They  appear well-developed  and well-nourished. There are no obvious deformities. No distress. The vital signs are reviewed  Pulmonary/Chest: Effort normal.   GI: Soft. The patient exhibits no distension and no mass. There is no tenderness. There is no rebound and no guarding. No hernia.   Neurological: Patient is alert and oriented to person, place, and time.   Skin: Skin is warm and dry. Not diaphoretic.   Psychiatric:  normal mood and affect. Not agitated.       Lab Results   Component Value Date    GLUCOSE 107 (H) 05/11/2020    BUN 22 05/11/2020    CREATININE 0.93 05/11/2020    EGFRIFNONA 59 (L) 05/11/2020    EGFRIFAFRI >60.0 02/27/2020    BCR 23.7 05/11/2020    CO2 28.0 05/11/2020    CALCIUM 9.8 05/11/2020    ALBUMIN 4.20 05/11/2020    AST 17 05/11/2020    ALT 12 05/11/2020     Lab Results   Component Value Date    GLUCOSE 107 (H) 05/11/2020    CALCIUM 9.8 05/11/2020     (L) 05/11/2020    K 4.2 05/11/2020    CO2 28.0 05/11/2020    CL 97 (L) 05/11/2020    BUN 22 05/11/2020    CREATININE 0.93 05/11/2020    EGFRIFAFRI >60.0 02/27/2020    EGFRIFNONA 59 (L) 05/11/2020    BCR 23.7 05/11/2020    ANIONGAP 8.0 05/11/2020     Lab Results   Component Value Date    WBC 6.07 05/11/2020    HGB 13.1 05/11/2020    HCT 38.9 05/11/2020    MCV 88.0 05/11/2020     05/11/2020     No results found for: PSA  No results found for: URINECX  Brief Urine Lab Results  (Last result in the past 365 days)      Color   Clarity   Blood   Leuk Est   Nitrite   Protein   CREAT   Urine HCG        03/26/20 1044 Yellow Clear Negative Small (1+) Negative Negative             MRI ABDOMEN W WO CONTRAST- 3/26/2020 10:11 AM CDT     HISTORY: Left-sided abdominal pain, abnormal CT of the kidneys     COMPARISON: CT abdomen and pelvis contrast 03/14/2020     Technical: Multiplanar, multisequence imaging was performed through the  abdomen before and after the administration of IV contrast.     FINDINGS:  There is no loss of signal within the liver on opposed phase imaging  to  suggest hepatic steatosis. Tiny cysts are seen in the liver. The liver  is otherwise unremarkable.     The gallbladder, spleen, and adrenal glands are unremarkable. Pancreas  is grossly normal in appearance.     Tiny cysts are seen in the left kidney. A T1 and T2 hypointense mass  arises from the medial aspect of the inferior right kidney which  measures 4.4 x 3.4 x 4.1 cm in size. On postcontrast imaging, there is  heterogeneous enhancement of the mass, compatible with a solid lesion  and concerning for renal cell carcinoma. No additional enhancing renal  lesions are identified.     A hiatal hernia is present.     IMPRESSION:  Solid, enhancing mass arising from the inferomedial right  kidney measuring up to 4.4 cm in size. This is most concerning for renal  cell carcinoma.  This report was finalized on 03/26/2020 10:16 by Dr. Yovany Jimenez MD.    CT ABDOMEN PELVIS W CONTRAST-  3/14/2020 8:42 PM CDT     HISTORY: Left-sided abdominal pain    TECHNIQUE: Following the intravenous administration of contrast, helical  CT tomographic images of the abdomen and pelvis were acquired. Coronal  reformatted images were also provided for review.      FINDINGS:   The lung bases and base of the heart are unremarkable.      LIVER: No focal liver lesion. The hepatic vasculature is patent.      BILIARY SYSTEM: The gallbladder is unremarkable. No intrahepatic or  extrahepatic ductal dilatation.      PANCREAS: No focal pancreatic lesion.      SPLEEN: Unremarkable.      KIDNEYS AND ADRENALS: Adrenal glands are visualized. Left renal contour  appears normal. There is a 4.2 cm nonuniform enhancing mass associated  with the lower pole of the right kidney. This may be a proteinaceous or  hemorrhagic cyst however a neoplasm cannot be excluded.. The ureters are  decompressed and normal in appearance.     RETROPERITONEUM: No mass, lymphadenopathy or hemorrhage.      GI TRACT: No evidence of obstruction or bowel wall thickening.  The  appendix is visualized and unremarkable. Large hiatal hernia is present     OTHER: A few normal-sized mesenteric lymph nodes are present this is  nonspecific. Vascular calcifications present abdominal aorta and iliac  arteries.. Subchondral degenerative cyst is noted in the left femoral  head degenerative change in the facet joints is present L5-S1.  Subchondral degenerative cystic changes present right femoral head.     PELVIS: No mass lesion, fluid collection or significant lymphadenopathy  is seen in the pelvis. The urinary bladder is normal in appearance.     IMPRESSION:  1. 42 mm mass lower pole of the right kidney. This may be a  proteinaceous or hemorrhagic cyst however neoplasm cannot be excluded.  2. A few normal-sized mesenteric lymph nodes are present which is  nonspecific.  3. Large hiatal hernia        This report was finalized on 03/14/2020 20:51 by Dr. Dandre Archuleta MD.    Assessment and Plan  Right renal mass.  The inferior medial location of this mass limits the ability do partial nephrectomy.  This is discussed with the patient would proceed with radical not nephrectomy.  Contralateral kidney looks normal.  Her estimated glomerular filtration rate is 59.      (Please note that portions of this note were completed with a voice recognition program.)  Quinton Rainey MD  05/13/20  10:27              Electronically signed by Quinton Rainey MD at 05/13/20 1038         Current Facility-Administered Medications   Medication Dose Route Frequency Provider Last Rate Last Dose   • bisacodyl (DULCOLAX) suppository 10 mg  10 mg Rectal Daily PRN Quinton Rainey MD       • docusate sodium (COLACE) capsule 100 mg  100 mg Oral BID PRN Quinton Rainey MD       • metoprolol succinate XL (TOPROL-XL) 24 hr tablet 50 mg  50 mg Oral Q24H Quinton Rainey MD   50 mg at 05/14/20 1010   • NIFEdipine XL (PROCARDIA XL) 24 hr tablet 60 mg  60 mg Oral Q24H Quinton Rainey MD   60 mg at 05/14/20 1011   • sodium  chloride 0.45 % infusion  150 mL/hr Intravenous Continuous Quinton Rainey  mL/hr at 05/14/20 0722 150 mL/hr at 05/14/20 0722        Operative/Procedure Notes (last 48 hours) (Notes from 05/12/20 1425 through 05/14/20 1425)      Quinton Rainey MD at 05/13/20 1103          Operative Summary    Neela Awad  Date of Procedure: 5/13/2020    Pre-op Diagnosis:   Right renal mass [N28.89]    Post-op Diagnosis:     Post-Op Diagnosis Codes:     * Right renal mass [N28.89]    Procedure/CPT® Codes:      Procedure(s):  RIGHT NEPHRECTOMY LAPAROSCOPIC WITH DAVINCI ROBOT    Surgeon(s):  Quinton Rainey MD    Anesthesia: General    Staff:   Circulator: Shahzad Matute, RN  Scrub Person: Umer Samson; Ashleigh Ni; Miguel Askew; Meredith Chatman    Indications for procedure:  Enhancing right renal mass likely renal cell carcinoma    Findings:   Solid-appearing mass medially that was not invading the psoas muscle.  There is no evidence of any significant adenopathy.  2 renal veins were present inserting into the vena cava separately.  Patient had a single renal artery.    Procedure details:  After appropriate anesthesia, positioning, prep and drape, timeout protocol was observed.  For positioning the patient is placed in essentially a left lateral decubitus position but with the kidney rest raised in slight flexed table.  An axillary roll was placed.  Legs were padded appropriately over the sequential compression devices and the patient's right arm is supported by a Campos arm board.    The abdomen is inspected for previous incisions which shows low midline from previous hysterectomy.  Pneumoperitoneum is achieved using a Veress needle technique in the right mid abdomen just lateral to the midclavicular line.  CO2 insufflation was carried out to the level of 15 mmHg.  This is maintained throughout the case.    5 mm trocar with visual obturator is used in the right paramedian line midway between the  "umbilicus and xiphoid process.  So that I could visualize the layers of subcutaneous fat, anterior fascia, muscular layer, posterior fascia and then peritoneum.  The abdomen is then closely inspected.  There is no evidence of metastatic disease.  The liver itself appears minimally enlarged but is without mass. Adhesions are prominent in the left lower quadrant and then the omentum was stuck over the anterior surface of Gerota's fascia.  The duodenum of the course pulled taut over the vena cava..:  Small intestine are inspected and found to be free of any abnormality.  The visualized omentum and mesentery is without excessive adenopathy or significant mass.    Four 8 [place]an trochars are placed in the right paramedian area to the right of midline in a straight line.  This did require removal of the 5 [place]an port which is later placed as a liver retractor as described.  The robot is now docked in standard fashion.  At this point the 5 mm trocar which was removed for placement of the camera port is then placed a couple fingerbreadths lateral and 3 to 4 fingerbreadths inferior to the xiphoid process.      The ascending colon and hepatic flexure are then mobilized.  Posterior mesentery was  from the anterior surface of Gerota's fascia.  At this point Iwas able to identify the location of the mass as it was surrounded by Gerota's fascia.  After the colon was safely mobilized the duodenum is identified.  A Kocher maneuver was performed on the duodenum to expose the anterior surface of the vena cava.    The right gonadal vein is identified going into the anterior surface of the vena cava.  I was able to identify the gonadal vein below the lower pole of the kidney and traced this up to the cava.  The vena cava is exposed with an anterior \"split and roll \"technique using the robotic instruments below the lower pole of the kidney and up to the upper pole of the kidney.  There was no evidence of a caval thrombus.  I was " able to identify the 2 right renal veins with separate insertions into the vena cava..  No evidence of thrombus.  At this point I dissected out both of these.  Once I had mobilized this circumferentially I could retracted anteriorly until the renal arterial supply could be identified.  There was a single renal artery with an early posterior branch just lateral to the vena cava.  This could be located between the 2 renal veins.  I was able to put a vascular white load on the artery.. The veins are both divided with a single staple line from the excised stapler with white load..     Now that the blood flow been interrupted to the kidney I mobilized the inferior medial pole as well as the inferior pole posterior aspect of the kidney itself.  The ureter is divided by placing hemo-lock clips on it and dividing it.  The lateral inferior aspect of the kidney was then mobilized.  I retracted this anteriorly with the fourth arm of the robot with a pro-grasp forceps.  This let me carry out the lateral and posterior dissections above the level of the hilum.  I then turned my attention to the upper medial portion of the kidney which is mobilized off the cava.  Both bipolar monopolar energy are used throughout this dissection with PK forceps and monopolar scissors.  Care was taken to avoid cautery around bile.  The remaining portion of the kidney was the upper pole which is then dissected.  The adrenal gland is spared as there was no evidence of mass in the lower pole the kidney was involved..  At this point I was able to remove the kidney so I placed it on top of the liver.  I removed the arm for trocar and placed a large laparoscopic specimen removal bag.  I inserted the specimen into this and closed it and pulled the string out the right lower quadrant incision which would later be extended to remove the entire device.  I drop the CO2 insufflation pressure to 8 mmHg and saw no active evidence of bleeding.  This is then  splayed over the hilum and the underside of the liver which showed no evidence of damage.  Hemostasis was good.    Robot is undocked in standard fashion.  Right lower quadrant incision is extended through the external/internal Bleich musculature as well as transversalis fascia and muscle which allowed me to enter the peritoneum and remove the specimen intact and inside the laparoscopic bag.  After its removal the musculature layers are closed with 0 PDS suture and subcutaneous tissues irrigated.  The 12 mm trocar sites, both the camera port in the air seal site, are closed with the previously placed 0 Vicryl sutures which were done with a Adan Mendoza needle under direct visualization.   Deep 3-0 chromic sutures were placed to approximate Aysha's fascia and subcutaneous fat.  Skin-o-fix was then placed over each of the incisions.        Estimated Blood Loss: 100 mL    Specimens:                Specimens     ID Source Type Tests Collected By Collected At Frozen?      A Kidney, Right Tissue · TISSUE PATHOLOGY EXAM   Quinton Rainey MD 5/13/20 1216 No     Description: Right Kidney            Drains: 16 Swedish Youssef catheter    Complications: none    Plan: Patient be admitted to [facility].      (Please note that portions of this note were completed with a voice recognition program.)  Quinton Rainey MD     Date: 5/13/2020  Time: 14:59        Electronically signed by Quinton Rainey MD at 05/13/20 1517          ivfl 150hr  Dilaudid iv x3    Nurse note:  Patient c/o mild pain this shift. Scheduled tordol given IV. IVF and IV abx infusing per orders. Youssef patient. VSS. Incisions open to air and c/d/i. VSS. Safety maintained. Will continue to monitor.

## 2020-05-14 NOTE — PLAN OF CARE
Problem: Patient Care Overview  Goal: Plan of Care Review  Outcome: Ongoing (interventions implemented as appropriate)  Flowsheets (Taken 5/14/2020 9183)  Progress: improving  Plan of Care Reviewed With: patient  Note:   Pt tolerated full liquids. Ambulated in the halls. DTV. If patient is able to void, she can go home this afternoon.

## 2020-05-14 NOTE — PLAN OF CARE
Problem: Patient Care Overview  Goal: Plan of Care Review  Outcome: Ongoing (interventions implemented as appropriate)  Flowsheets (Taken 5/14/2020 0319)  Progress: no change  Plan of Care Reviewed With: patient  Outcome Summary: Patient c/o mild pain this shift. Scheduled tordol given IV. IVF and IV abx infusing per orders. Youssef patient. VSS. Incisions open to air and c/d/i. VSS. Safety maintained. Will continue to monitor.

## 2020-05-14 NOTE — ANESTHESIA POSTPROCEDURE EVALUATION
"Patient: Neela Awad    Procedure Summary     Date:  05/13/20 Room / Location:  [facility] OR 14 /  [facility] OR    Anesthesia Start:  1109 Anesthesia Stop:  1514    Procedure:  NEPHRECTOMY LAPAROSCOPIC WITH DAVINCI ROBOT (N/A Abdomen) Diagnosis:       Right renal mass      (Right renal mass [N28.89])    Surgeon:  Quinton Rainey MD Provider:  Neri Milian CRNA    Anesthesia Type:  general ASA Status:  3          Anesthesia Type: general    Vitals  Vitals Value Taken Time   /66 5/13/2020  5:40 PM   Temp 98 °F (36.7 °C) 5/13/2020  5:40 PM   Pulse 59 5/13/2020  5:42 PM   Resp 15 5/13/2020  5:40 PM   SpO2 94 % 5/13/2020  5:42 PM   Vitals shown include unvalidated device data.        Post Anesthesia Care and Evaluation    Patient location during evaluation: PACU  Patient participation: complete - patient participated  Level of consciousness: awake and alert  Pain management: adequate  Airway patency: patent  Anesthetic complications: No anesthetic complications    Cardiovascular status: acceptable  Respiratory status: acceptable  Hydration status: acceptable    Comments: Blood pressure 146/67, pulse 68, temperature 97.5 °F (36.4 °C), temperature source Oral, resp. rate 18, height 154.5 cm (60.83\"), weight 62.9 kg (138 lb 10.7 oz), SpO2 96 %, not currently breastfeeding.    Pt discharged from PACU based on cathy score >8      "

## 2020-05-15 ENCOUNTER — TELEPHONE (OUTPATIENT)
Dept: UROLOGY | Facility: CLINIC | Age: 75
End: 2020-05-15

## 2020-05-15 LAB
CYTO UR: NORMAL
LAB AP CASE REPORT: NORMAL
LAB AP SYNOPTIC CHECKLIST: NORMAL
PATH REPORT.FINAL DX SPEC: NORMAL
PATH REPORT.GROSS SPEC: NORMAL

## 2020-05-15 RX ORDER — HYDROCODONE BITARTRATE AND ACETAMINOPHEN 7.5; 325 MG/1; MG/1
1 TABLET ORAL EVERY 6 HOURS PRN
Qty: 12 TABLET | Refills: 0 | Status: SHIPPED | OUTPATIENT
Start: 2020-05-15 | End: 2020-05-18

## 2020-05-15 NOTE — PAYOR COMM NOTE
"MT HOME 5-14-20   Case-[id number]   [phone]    Neela Huffman (74 y.o. Female)     Date of Birth Social Security Number Address Home Phone MRN    1945  [id number] [place] [address] [phone] [id number]    Mandaen Marital Status          [facility]        Admission Date Admission Type Admitting Provider Attending Provider Department, Room/Bed    5/13/20 Elective Quinton Rainey MD  [facility] 3C, 371/1    Discharge Date Discharge Disposition Discharge Destination        5/14/2020 Home or Self Care              Attending Provider:  (none)   Allergies:  Allopurinol    Isolation:  None   Infection:  None   Code Status:  Prior    Ht:  154.5 cm (60.83\")   Wt:  62.9 kg (138 lb 10.7 oz)    Admission Cmt:  None   Principal Problem:  Right renal mass [N28.89]                 Active Insurance as of 5/13/2020     Primary Coverage     Payor Plan Insurance Group Employer/Plan Group    ANTHEM MEDICARE REPLACEMENT ANTHEM MEDICARE ADVANTAGE [id number]     Payor Plan Address Payor Plan Phone Number Payor Plan Fax Number Effective Dates    [address] [phone]  1/1/2016 - None Entered    [facility] [address]       Subscriber Name Subscriber Birth Date Member ID       NEELA HUFFMAN 1945 [id number]                 Emergency Contacts      (Rel.) Home Phone Work Phone Mobile Phone    Suzette Solano (Daughter) [phone] -- --    TIMOTEO TINOCO (Sister) -- -- [phone]    ARMIDALYNDA CHILDRESS (Son) -- -- [phone]            Discharge Summary    No notes of this type exist for this encounter.         "

## 2020-05-15 NOTE — OUTREACH NOTE
Prep Survey      Responses   [facility] facility patient discharged from?  [place]   Is LACE score < 7 ?  Yes   Eligibility  Not Eligible [LACE <7, not CM, not covid related]   What are the reasons patient is not eligible?  Other   COVID-19 Test Status  Negative   Does the patient have one of the following disease processes/diagnoses(primary or secondary)?  General Surgery   Prep survey completed?  Yes          Mimi Hansen RN

## 2020-05-15 NOTE — TELEPHONE ENCOUNTER
Dr. Rainey has sent the medication to [facility]. Pt daughter has been notified.

## 2020-05-17 LAB
BH BB BLOOD EXPIRATION DATE: NORMAL
BH BB BLOOD EXPIRATION DATE: NORMAL
BH BB BLOOD TYPE BARCODE: 6200
BH BB BLOOD TYPE BARCODE: 6200
BH BB DISPENSE STATUS: NORMAL
BH BB DISPENSE STATUS: NORMAL
BH BB PRODUCT CODE: NORMAL
BH BB PRODUCT CODE: NORMAL
BH BB UNIT NUMBER: NORMAL
BH BB UNIT NUMBER: NORMAL
CROSSMATCH INTERPRETATION: NORMAL
CROSSMATCH INTERPRETATION: NORMAL
UNIT  ABO: NORMAL
UNIT  ABO: NORMAL
UNIT  RH: NORMAL
UNIT  RH: NORMAL

## 2020-05-18 NOTE — PROGRESS NOTES
Ms. Awad is 74 y.o. female    CHIEF COMPLAINT: I am here today for my post op and to discuss my pathology report for my Right Renal Mass.     HPI  Postop radical nephrectomy.  Doing well.  Some incisional tenderness especially the bigger incision to remove the mass.  No wound drainage.  Appetite good.      History related to this issue:   -05/2020: Right robot assisted laparoscopic nephrectomy  Final Diagnosis   Right kidney, right nephrectomy:  Clear-cell renal cell carcinoma, WHO/ISUP grade 2  Tumor measures 4.3 cm in greatest dimension.  Tumor is confined to kidney.  Margins of excision free of tumor.  No evidence of sarcomatoid or rhabdoid features.  No evidence of lymph vascular space invasion.     AJCC pathologic stage:  pT1b Nx   Electronically signed by Jadiel Bergman MD on 5/15/2020 at 1442         The following portions of the patient's history were reviewed and updated as appropriate: allergies, current medications, past family history, past medical history, past social history, past surgical history and problem list.      Review of Systems   Constitutional: Negative for chills and fever.   Gastrointestinal: Negative for abdominal pain, anal bleeding and blood in stool.   Genitourinary: Negative for dysuria, frequency, hematuria and urgency.         Current Outpatient Medications:   •  aspirin 81 MG EC tablet, Take 81 mg by mouth Daily., Disp: , Rfl:   •  calcium carbonate (OS-EMILIANA) 600 MG tablet, Take 600 mg by mouth Daily., Disp: , Rfl:   •  colchicine-probenecid (COL-BENEMID) 0.5-500 MG tablet, Take 1 tablet by mouth Daily., Disp: , Rfl:   •  ferrous sulfate 325 (65 FE) MG tablet, Take 325 mg by mouth Daily With Breakfast., Disp: , Rfl:   •  Garlic 1000 MG capsule, Take  by mouth Daily., Disp: , Rfl:   •  hydrochlorothiazide (HYDRODIURIL) 12.5 MG tablet, Take 12.5 mg by mouth Daily., Disp: , Rfl:   •  meclizine (ANTIVERT) 25 MG tablet, Take 25 mg by mouth 3 (Three) Times a Day As Needed for  "dizziness., Disp: , Rfl:   •  METOPROLOL SUCCINATE ER PO, Take 25 mg by mouth 2 (Two) Times a Day., Disp: , Rfl:   •  NIFEdipine XL (PROCARDIA XL) 60 MG 24 hr tablet, Take 60 mg by mouth Daily., Disp: , Rfl:   •  polyethylene glycol (MIRALAX) packet, Take 17 g by mouth Daily As Needed., Disp: , Rfl:   •  quinapril (ACCUPRIL) 20 MG tablet, Take 20 mg by mouth 2 (Two) Times a Day., Disp: , Rfl:   •  simvastatin (ZOCOR) 40 MG tablet, Take 40 mg by mouth Every Night., Disp: , Rfl:   •  vitamin B-12 (CYANOCOBALAMIN) 500 MCG tablet, Take 500 mcg by mouth Every Other Day., Disp: , Rfl:     Past Medical History:   Diagnosis Date   • Anemia    • Brain bleed (CMS/HCC)     TOOK SHOTS IN EYES    • Cancer of kidney (CMS/HCC)    • Cataract    • Colon polyp    • Constipation    • Elevated cholesterol    • Gout    • Hyperlipidemia    • Hypertension    • Incontinence    • Insomnia    • Nocturia    • SCC (squamous cell carcinoma), scalp/neck     right neck       Past Surgical History:   Procedure Laterality Date   • COLONOSCOPY     • HYSTERECTOMY     • JOINT REPLACEMENT Right    • KNEE SURGERY Right    • NEPHRECTOMY N/A 5/13/2020    Procedure: NEPHRECTOMY LAPAROSCOPIC WITH DAVINCI ROBOT;  Surgeon: Quinton Rainey MD;  Location: [facility];  Service: [facility];  Laterality: N/A;       Social History     Socioeconomic History   • Marital status: Legally      Spouse name: Not on file   • Number of children: Not on file   • Years of education: Not on file   • Highest education level: Not on file   Tobacco Use   • Smoking status: Never Smoker   • Smokeless tobacco: Never Used   Substance and Sexual Activity   • Alcohol use: No   • Drug use: No   • Sexual activity: Defer       Family History   Problem Relation Age of Onset   • Heart disease Other    • Diabetes Other    • Hypertension Other    • Obesity Other    • Kidney disease Other          Temp 96.9 °F (36.1 °C)   Ht 154.9 cm (61\")   Wt 63.5 kg (140 lb)   BMI 26.45 kg/m² "       Physical Exam  Incisions look good.  There is a little bit of erythema at the most cephalad aspect of the incision with the kidneys removed in the right lower quadrant.  That incision is paramedian.  But there is no drainage nor any evidence of infection.    Data  Results for orders placed or performed in visit on 05/21/20   POC Urinalysis Dipstick, Multipro   Result Value Ref Range    Color Yellow Yellow, Straw, Dark Yellow, Kelly    Clarity, UA Clear Clear    Glucose, UA Negative Negative, 1000 mg/dL (3+) mg/dL    Bilirubin Negative Negative    Ketones, UA Negative Negative    Specific Gravity  1.015 1.005 - 1.030    Blood, UA Negative Negative    pH, Urine 6.0 5.0 - 8.0    Protein, POC 2+ (A) Negative mg/dL    Urobilinogen, UA Normal Normal    Nitrite, UA Negative Negative    Leukocytes Trace (A) Negative       Assessment and Plan  Diagnoses and all orders for this visit:    Clear cell renal cell carcinoma, right (CMS/HCC)  -     POC Urinalysis Dipstick, Multipro  -     CBC Auto Differential; Future  -     CBC Auto Differential  -     Basic Metabolic Panel  -     Comprehensive metabolic panel; Future  -     eGFR-Glomerular Filtration; Future      CBC and CMP in 3 months.  We will image her at 6 months.        (Please note that portions of this note were completed with a voice recognition program.)  Quinton Rainey MD  05/21/20  09:25

## 2020-05-21 ENCOUNTER — OFFICE VISIT (OUTPATIENT)
Dept: UROLOGY | Facility: CLINIC | Age: 75
End: 2020-05-21

## 2020-05-21 VITALS — BODY MASS INDEX: 26.43 KG/M2 | TEMPERATURE: 96.9 F | HEIGHT: 61 IN | WEIGHT: 140 LBS

## 2020-05-21 DIAGNOSIS — C64.1 CLEAR CELL RENAL CELL CARCINOMA, RIGHT (HCC): Primary | ICD-10-CM

## 2020-05-21 LAB
BILIRUB BLD-MCNC: NEGATIVE MG/DL
CLARITY, POC: CLEAR
COLOR UR: YELLOW
GLUCOSE UR STRIP-MCNC: NEGATIVE MG/DL
KETONES UR QL: NEGATIVE
LEUKOCYTE EST, POC: ABNORMAL
NITRITE UR-MCNC: NEGATIVE MG/ML
PH UR: 6 [PH] (ref 5–8)
PROT UR STRIP-MCNC: ABNORMAL MG/DL
RBC # UR STRIP: NEGATIVE /UL
SP GR UR: 1.01 (ref 1–1.03)
UROBILINOGEN UR QL: NORMAL

## 2020-05-21 PROCEDURE — 99024 POSTOP FOLLOW-UP VISIT: CPT | Performed by: UROLOGY

## 2020-05-21 PROCEDURE — 81003 URINALYSIS AUTO W/O SCOPE: CPT | Performed by: UROLOGY

## 2020-05-22 DIAGNOSIS — C64.1 CLEAR CELL RENAL CELL CARCINOMA, RIGHT (HCC): Primary | ICD-10-CM

## 2020-05-22 LAB
BASOPHILS # BLD AUTO: 0.04 10*3/MM3 (ref 0–0.2)
BASOPHILS NFR BLD AUTO: 0.5 % (ref 0–1.5)
BUN SERPL-MCNC: 30 MG/DL (ref 8–23)
BUN/CREAT SERPL: 14.3 (ref 7–25)
CALCIUM SERPL-MCNC: 10.4 MG/DL (ref 8.6–10.5)
CHLORIDE SERPL-SCNC: 93 MMOL/L (ref 98–107)
CO2 SERPL-SCNC: 31.3 MMOL/L (ref 22–29)
CREAT SERPL-MCNC: 2.1 MG/DL (ref 0.57–1)
EOSINOPHIL # BLD AUTO: 0.43 10*3/MM3 (ref 0–0.4)
EOSINOPHIL NFR BLD AUTO: 5.6 % (ref 0.3–6.2)
ERYTHROCYTE [DISTWIDTH] IN BLOOD BY AUTOMATED COUNT: 12.8 % (ref 12.3–15.4)
GLUCOSE SERPL-MCNC: 118 MG/DL (ref 65–99)
HCT VFR BLD AUTO: 38.4 % (ref 34–46.6)
HGB BLD-MCNC: 13 G/DL (ref 12–15.9)
IMM GRANULOCYTES # BLD AUTO: 0.05 10*3/MM3 (ref 0–0.05)
IMM GRANULOCYTES NFR BLD AUTO: 0.6 % (ref 0–0.5)
LYMPHOCYTES # BLD AUTO: 0.65 10*3/MM3 (ref 0.7–3.1)
LYMPHOCYTES NFR BLD AUTO: 8.4 % (ref 19.6–45.3)
MCH RBC QN AUTO: 30.3 PG (ref 26.6–33)
MCHC RBC AUTO-ENTMCNC: 33.9 G/DL (ref 31.5–35.7)
MCV RBC AUTO: 89.5 FL (ref 79–97)
MONOCYTES # BLD AUTO: 0.9 10*3/MM3 (ref 0.1–0.9)
MONOCYTES NFR BLD AUTO: 11.7 % (ref 5–12)
NEUTROPHILS # BLD AUTO: 5.63 10*3/MM3 (ref 1.7–7)
NEUTROPHILS NFR BLD AUTO: 73.2 % (ref 42.7–76)
NRBC BLD AUTO-RTO: 0 /100 WBC (ref 0–0.2)
PLATELET # BLD AUTO: 276 10*3/MM3 (ref 140–450)
POTASSIUM SERPL-SCNC: 4.8 MMOL/L (ref 3.5–5.2)
RBC # BLD AUTO: 4.29 10*6/MM3 (ref 3.77–5.28)
SODIUM SERPL-SCNC: 133 MMOL/L (ref 136–145)
WBC # BLD AUTO: 7.7 10*3/MM3 (ref 3.4–10.8)

## 2020-06-22 DIAGNOSIS — E04.2 NONTOXIC MULTINODULAR GOITER: Primary | ICD-10-CM

## 2020-07-06 ENCOUNTER — HOSPITAL ENCOUNTER (OUTPATIENT)
Dept: ULTRASOUND IMAGING | Facility: HOSPITAL | Age: 75
Discharge: HOME | End: 2020-07-06
Admitting: OTOLARYNGOLOGY

## 2020-07-06 DIAGNOSIS — E04.2 NONTOXIC MULTINODULAR GOITER: ICD-10-CM

## 2020-07-06 PROCEDURE — 76536 US EXAM OF HEAD AND NECK: CPT

## 2020-07-08 ENCOUNTER — OFFICE VISIT (OUTPATIENT)
Dept: OTOLARYNGOLOGY | Facility: CLINIC | Age: 75
End: 2020-07-08

## 2020-07-08 VITALS
BODY MASS INDEX: 25.71 KG/M2 | HEIGHT: 61 IN | DIASTOLIC BLOOD PRESSURE: 100 MMHG | WEIGHT: 136.2 LBS | HEART RATE: 66 BPM | TEMPERATURE: 97.2 F | SYSTOLIC BLOOD PRESSURE: 137 MMHG

## 2020-07-08 DIAGNOSIS — E04.1 THYROID NODULE: ICD-10-CM

## 2020-07-08 DIAGNOSIS — E04.2 NONTOXIC MULTINODULAR GOITER: Primary | ICD-10-CM

## 2020-07-08 PROCEDURE — 99213 OFFICE O/P EST LOW 20 MIN: CPT | Performed by: OTOLARYNGOLOGY

## 2020-07-08 NOTE — PROGRESS NOTES
Irene Arellano RN   Patient Intake Note    Review of Systems  Review of Systems   Constitutional: Negative for chills and fever.   HENT: Negative for trouble swallowing.    Respiratory: Negative for cough, choking and shortness of breath.    Gastrointestinal: Negative for diarrhea, nausea and vomiting.   Musculoskeletal: Negative for neck pain and neck stiffness.   Neurological: Positive for light-headedness. Negative for dizziness and headaches.   Hematological: Does not bruise/bleed easily.   Psychiatric/Behavioral: Negative for sleep disturbance.   All other systems reviewed and are negative.      Tobacco Use: Screening and Cessation Intervention  Social History    Tobacco Use      Smoking status: Never Smoker      Smokeless tobacco: Never Used        Irene Arellano RN  7/8/2020  10:53

## 2020-07-08 NOTE — PROGRESS NOTES
Chief Complaint   Patient presents with   • Thyroid Problem     FOLLOW UP       Subjective   History of Present Illness:  Neela Awad is a 74 y.o. female returns for follow up. The patient denies complaints. The patient has not had a neck mass, trouble swallowing or lymphadenopathy. The patient has not had  weight gain, fluid retention or trouble concentrating.    Review of Systems   HENT: No neck mass, no trouble swallowing; CONSTITUTIONAL: No fever, chills, no weight gain; GI: no nausea    Past History:  History reviewed on the chart and updated as necessary.  Allergies:  Allopurinol     Objective   Vital Signs  Temp:  [97.2 °F (36.2 °C)] 97.2 °F (36.2 °C)  Heart Rate:  [66] 66  BP: (137)/(100) 137/100    Physical Exam  CONSTITUTIONAL: well nourished, well-developed, alert, oriented, in no acute distress   COMMUNICATION AND VOICE: able to communicate normally, normal voice quality  HEAD: normocephalic, no lesions, atraumatic, no tenderness, no masses   FACE: appearance normal, no lesions, no tenderness, no deformities, facial motion symmetric  EYES: ocular motility normal, eyelids normal, orbits normal, no proptosis, conjunctiva normal , pupils equal, round   EARS:  Hearing: hearing to conversational voice intact bilaterally   External Ears: normal bilaterally, no lesions  NOSE:  External Nose: external nasal structure normal, no tenderness on palpation, no nasal discharge, no lesions, no evidence of trauma, nostrils patent   ORAL:  Lips: upper and lower lips without lesion   NECK:  Inspection and Palpation: neck appearance normal, no masses or tenderness  THYROID: nodular texture present  CHEST/RESPIRATORY: normal respiratory effort   CARDIOVASCULAR: no cyanosis or edema   NEUROLOGICAL/PSYCHIATRIC: oriented to time, place and person, mood normal, affect appropriate, CN II-XII intact grossly    Results Review:   I reviewed the patient's new clinical results.  Lab Results   Component Value Date    TSH 1.19  03/05/2019     Thyroid Ultrasound: IMPRESSION:  1. Enlarged multinodular thyroid gland, similar to 06/14/2019.  This report was finalized on 07/06/2020 13:21 by Dr. Ludivina Molina MD.     Assessment/Plan   Assessment:  1. Nontoxic multinodular goiter    2. Thyroid nodule        Plan:     Orders Placed This Encounter   Procedures   • US thyroid- hospital in 1 year       Return in 1 year (on 7/8/2021).     Michael Loyola MD  07/08/20  11:10

## 2020-07-14 ENCOUNTER — HOSPITAL ENCOUNTER (OUTPATIENT)
Dept: ULTRASOUND IMAGING | Age: 75
Discharge: HOME | End: 2020-07-14
Payer: MEDICARE

## 2020-07-14 PROCEDURE — 76770 US EXAM ABDO BACK WALL COMP: CPT

## 2020-08-05 ENCOUNTER — TELEPHONE (OUTPATIENT)
Dept: UROLOGY | Facility: CLINIC | Age: 75
End: 2020-08-05

## 2020-08-05 NOTE — TELEPHONE ENCOUNTER
LVM with daughter that this was normal after procedure and it would be appropriate to keep appt for 8/24/20. If they had any other questions or concerns, to please call me back.

## 2020-08-05 NOTE — TELEPHONE ENCOUNTER
Pt daughter called, pt has a f/u on 8/24. However, where her incision is, there is a hard knot that hurts. She said it may be scar tissue but they weren't sure. She denies N&V and has no fever. She feels fine. She saw her Nephrologist and her Kidney function looked good. She didn't know if they needed to be seen sooner or keep appt on 8/24/20.

## 2020-08-19 ENCOUNTER — RESULTS ENCOUNTER (OUTPATIENT)
Dept: UROLOGY | Facility: CLINIC | Age: 75
End: 2020-08-19

## 2020-08-19 DIAGNOSIS — C64.1 CLEAR CELL RENAL CELL CARCINOMA, RIGHT (HCC): ICD-10-CM

## 2020-08-21 NOTE — PROGRESS NOTES
Ms. Awad is 74 y.o. female    CHIEF COMPLAINT: f/u kidney cancer  HPI  Renal cell carcinoma  Location: right kidney  Quality:  Clear cell RCCa  Severity: Organ confined 4.3 cm (t1b)  Duration: diagnosed 05/2020  Context: Incidental CT finding  Modifying factors: Underwent right robot assisted laparoscopic nephrectomy  Associated signs or symptoms: No hematuria, flank pain or weight loss.     History related to this issue:   -05/2020: Right robot assisted laparoscopic nephrectomy  Final Diagnosis   Right kidney, right nephrectomy:  Clear-cell renal cell carcinoma, WHO/ISUP grade 2  Tumor measures 4.3 cm in greatest dimension.  Tumor is confined to kidney.  Margins of excision free of tumor.  No evidence of sarcomatoid or rhabdoid features.  No evidence of lymph vascular space invasion.     AJCC pathologic stage:  pT1b Nx   Electronically signed by Jadiel Bergman MD on 5/15/2020 at 1442        The following portions of the patient's history were reviewed and updated as appropriate: allergies, current medications, past family history, past medical history, past social history, past surgical history and problem list.      Review of Systems   Constitutional: Negative for chills and fever.   Gastrointestinal: Negative for abdominal pain, anal bleeding and blood in stool.   Genitourinary: Negative for dysuria, frequency, hematuria and urgency.         Current Outpatient Medications:   •  aspirin 81 MG EC tablet, Take 81 mg by mouth Daily., Disp: , Rfl:   •  calcium carbonate (OS-EMILIANA) 600 MG tablet, Take 600 mg by mouth Daily., Disp: , Rfl:   •  colchicine-probenecid (COL-BENEMID) 0.5-500 MG tablet, Take 1 tablet by mouth Daily., Disp: , Rfl:   •  ferrous sulfate 325 (65 FE) MG tablet, Take 325 mg by mouth Daily With Breakfast., Disp: , Rfl:   •  Garlic 1000 MG capsule, Take  by mouth Daily., Disp: , Rfl:   •  hydrochlorothiazide (HYDRODIURIL) 12.5 MG tablet, Take 12.5 mg by mouth Daily., Disp: , Rfl:   •  meclizine  (ANTIVERT) 25 MG tablet, Take 25 mg by mouth 3 (Three) Times a Day As Needed for dizziness., Disp: , Rfl:   •  metoprolol tartrate (LOPRESSOR) 25 MG tablet, , Disp: , Rfl:   •  NIFEdipine XL (PROCARDIA XL) 60 MG 24 hr tablet, Take 60 mg by mouth Daily., Disp: , Rfl:   •  polyethylene glycol (MIRALAX) packet, Take 17 g by mouth Daily As Needed., Disp: , Rfl:   •  quinapril (ACCUPRIL) 20 MG tablet, Take 20 mg by mouth 2 (Two) Times a Day., Disp: , Rfl:   •  simvastatin (ZOCOR) 40 MG tablet, Take 40 mg by mouth Every Night., Disp: , Rfl:   •  vitamin B-12 (CYANOCOBALAMIN) 500 MCG tablet, Take 500 mcg by mouth Every Other Day., Disp: , Rfl:     Past Medical History:   Diagnosis Date   • Anemia    • Brain bleed (CMS/HCC)     TOOK SHOTS IN EYES    • Cancer of kidney (CMS/HCC)    • Cataract    • Colon polyp    • Constipation    • Elevated cholesterol    • Gout    • Hyperlipidemia    • Hypertension    • Incontinence    • Insomnia    • Nocturia    • SCC (squamous cell carcinoma), scalp/neck     right neck       Past Surgical History:   Procedure Laterality Date   • COLONOSCOPY     • HYSTERECTOMY     • JOINT REPLACEMENT Right    • KNEE SURGERY Right    • NEPHRECTOMY N/A 5/13/2020    Procedure: NEPHRECTOMY LAPAROSCOPIC WITH DAVINCI ROBOT;  Surgeon: Quinton Rainey MD;  Location: [facility];  Service: [facility];  Laterality: N/A;       Social History     Socioeconomic History   • Marital status: Legally      Spouse name: Not on file   • Number of children: Not on file   • Years of education: Not on file   • Highest education level: Not on file   Tobacco Use   • Smoking status: Never Smoker   • Smokeless tobacco: Never Used   Substance and Sexual Activity   • Alcohol use: No   • Drug use: No   • Sexual activity: Defer       Family History   Problem Relation Age of Onset   • Heart disease Other    • Diabetes Other    • Hypertension Other    • Obesity Other    • Kidney disease Other          Temp 97.3 °F (36.3 °C)   Ht  "154.9 cm (61\")   Wt 61.2 kg (135 lb)   BMI 25.51 kg/m²       Physical Exam  Constitutional: Patient is without distress or deformity.  Vital signs are reviewed as above.    Neuro: No confusion; No disorientation; Alert and oriented  Pulmonary: No respiratory distress.   Skin: No pallor or diaphoresis  Incisions have healed nicely. No hernia.         Data  Results for orders placed or performed in visit on 08/24/20   POC Urinalysis Dipstick, Multipro   Result Value Ref Range    Color Yellow Yellow, Straw, Dark Yellow, Kelly    Clarity, UA Clear Clear    Glucose, UA Negative Negative, 1000 mg/dL (3+) mg/dL    Bilirubin Negative Negative    Ketones, UA Negative Negative    Specific Gravity  1.020 1.005 - 1.030    Blood, UA Trace (A) Negative    pH, Urine 6.5 5.0 - 8.0    Protein,  mg/dL (A) Negative mg/dL    Urobilinogen, UA Normal Normal    Nitrite, UA Negative Negative    Leukocytes Small (1+) (A) Negative     Assessment and Plan  Diagnoses and all orders for this visit:    Clear cell renal cell carcinoma, right (CMS/HCC)  -     POC Urinalysis Dipstick, Multipro  -     CBC & Differential; Future  -     CT abdomen wo contrast; Future      CT in 3 months without contrast.         (Please note that portions of this note were completed with a voice recognition program.)  Quinton Rainey MD  08/24/20  14:26      "

## 2020-08-24 ENCOUNTER — LAB (OUTPATIENT)
Dept: LAB | Facility: HOSPITAL | Age: 75
End: 2020-08-24

## 2020-08-24 ENCOUNTER — OFFICE VISIT (OUTPATIENT)
Dept: UROLOGY | Facility: CLINIC | Age: 75
End: 2020-08-24

## 2020-08-24 VITALS — TEMPERATURE: 97.3 F | BODY MASS INDEX: 25.49 KG/M2 | HEIGHT: 61 IN | WEIGHT: 135 LBS

## 2020-08-24 DIAGNOSIS — C64.1 CLEAR CELL RENAL CELL CARCINOMA, RIGHT (HCC): Primary | ICD-10-CM

## 2020-08-24 DIAGNOSIS — C64.1 CLEAR CELL RENAL CELL CARCINOMA, RIGHT (HCC): ICD-10-CM

## 2020-08-24 LAB
BASOPHILS # BLD AUTO: 0.03 10*3/MM3 (ref 0–0.2)
BASOPHILS NFR BLD AUTO: 0.5 % (ref 0–1.5)
BILIRUB BLD-MCNC: NEGATIVE MG/DL
CLARITY, POC: CLEAR
COLOR UR: YELLOW
DEPRECATED RDW RBC AUTO: 43.2 FL (ref 37–54)
EOSINOPHIL # BLD AUTO: 0.23 10*3/MM3 (ref 0–0.4)
EOSINOPHIL NFR BLD AUTO: 3.8 % (ref 0.3–6.2)
ERYTHROCYTE [DISTWIDTH] IN BLOOD BY AUTOMATED COUNT: 13.3 % (ref 12.3–15.4)
GLUCOSE UR STRIP-MCNC: NEGATIVE MG/DL
HCT VFR BLD AUTO: 36.2 % (ref 34–46.6)
HGB BLD-MCNC: 12.1 G/DL (ref 12–15.9)
IMM GRANULOCYTES # BLD AUTO: 0.02 10*3/MM3 (ref 0–0.05)
IMM GRANULOCYTES NFR BLD AUTO: 0.3 % (ref 0–0.5)
KETONES UR QL: NEGATIVE
LEUKOCYTE EST, POC: ABNORMAL
LYMPHOCYTES # BLD AUTO: 0.68 10*3/MM3 (ref 0.7–3.1)
LYMPHOCYTES NFR BLD AUTO: 11.1 % (ref 19.6–45.3)
MCH RBC QN AUTO: 29.6 PG (ref 26.6–33)
MCHC RBC AUTO-ENTMCNC: 33.4 G/DL (ref 31.5–35.7)
MCV RBC AUTO: 88.5 FL (ref 79–97)
MONOCYTES # BLD AUTO: 0.51 10*3/MM3 (ref 0.1–0.9)
MONOCYTES NFR BLD AUTO: 8.3 % (ref 5–12)
NEUTROPHILS NFR BLD AUTO: 4.65 10*3/MM3 (ref 1.7–7)
NEUTROPHILS NFR BLD AUTO: 76 % (ref 42.7–76)
NITRITE UR-MCNC: NEGATIVE MG/ML
NRBC BLD AUTO-RTO: 0 /100 WBC (ref 0–0.2)
PH UR: 6.5 [PH] (ref 5–8)
PLATELET # BLD AUTO: 223 10*3/MM3 (ref 140–450)
PMV BLD AUTO: 10.3 FL (ref 6–12)
PROT UR STRIP-MCNC: ABNORMAL MG/DL
RBC # BLD AUTO: 4.09 10*6/MM3 (ref 3.77–5.28)
RBC # UR STRIP: ABNORMAL /UL
SP GR UR: 1.02 (ref 1–1.03)
UROBILINOGEN UR QL: NORMAL
WBC # BLD AUTO: 6.12 10*3/MM3 (ref 3.4–10.8)

## 2020-08-24 PROCEDURE — 85025 COMPLETE CBC W/AUTO DIFF WBC: CPT | Performed by: UROLOGY

## 2020-08-24 PROCEDURE — 99213 OFFICE O/P EST LOW 20 MIN: CPT | Performed by: UROLOGY

## 2020-08-24 PROCEDURE — 36415 COLL VENOUS BLD VENIPUNCTURE: CPT

## 2020-08-24 PROCEDURE — 81003 URINALYSIS AUTO W/O SCOPE: CPT | Performed by: UROLOGY

## 2020-10-30 ENCOUNTER — HOSPITAL ENCOUNTER (OUTPATIENT)
Dept: CT IMAGING | Facility: HOSPITAL | Age: 75
Discharge: HOME | End: 2020-10-30
Admitting: UROLOGY

## 2020-10-30 DIAGNOSIS — C64.1 CLEAR CELL RENAL CELL CARCINOMA, RIGHT (HCC): ICD-10-CM

## 2020-10-30 PROCEDURE — 74150 CT ABDOMEN W/O CONTRAST: CPT

## 2020-11-16 NOTE — PROGRESS NOTES
Ms. Awad is 75 y.o. female    CHIEF COMPLAINT: 4 month follow up for Right Renal Cell Carcinoma. Imaging was done at [facility]    Renal cell carcinoma  The location, severity of disease, quality, duration of onset, and treatment initiated are reviewed. This is unchanged from last visit except where updated as need. Her interval history is as follows:     She has been doing very well.  She continues to follow-up with nephrology because she has chronic kidney disease stage IIIb.  She has had no hematuria, flank pain or unexplained weight loss.    Location: right kidney  Quality:  Clear cell RCCa  Severity: Organ confined 4.3 cm (t1b)  Duration: diagnosed 05/2020  Context: Incidental CT finding  Modifying factors: Underwent right robot assisted laparoscopic nephrectomy          History related to this issue:   -05/2020: Right robot assisted laparoscopic nephrectomy  Final Diagnosis   Right kidney, right nephrectomy:  Clear-cell renal cell carcinoma, WHO/ISUP grade 2  Tumor measures 4.3 cm in greatest dimension.  Tumor is confined to kidney.  Margins of excision free of tumor.  No evidence of sarcomatoid or rhabdoid features.  No evidence of lymph vascular space invasion.     AJCC pathologic stage:  pT1b Nx   Electronically signed by Jadiel Bergman MD on 5/15/2020 at 1442       The following portions of the patient's history were reviewed and updated as appropriate: allergies, current medications, past family history, past medical history, past social history, past surgical history and problem list.      Review of Systems   Constitutional: Negative for chills and fever.   Gastrointestinal: Negative for abdominal pain, anal bleeding and blood in stool.   Genitourinary: Negative for dysuria, frequency, hematuria and urgency.         Current Outpatient Medications:   •  aspirin 81 MG EC tablet, Take 81 mg by mouth Daily., Disp: , Rfl:   •  calcium carbonate (OS-EMILIANA) 600 MG tablet, Take 600 mg by mouth Daily.,  Disp: , Rfl:   •  colchicine-probenecid (COL-BENEMID) 0.5-500 MG tablet, Take 1 tablet by mouth Daily., Disp: , Rfl:   •  ferrous sulfate 325 (65 FE) MG tablet, Take 325 mg by mouth Daily With Breakfast., Disp: , Rfl:   •  Garlic 1000 MG capsule, Take  by mouth Daily., Disp: , Rfl:   •  hydrochlorothiazide (HYDRODIURIL) 12.5 MG tablet, Take 12.5 mg by mouth Daily., Disp: , Rfl:   •  meclizine (ANTIVERT) 25 MG tablet, Take 25 mg by mouth 3 (Three) Times a Day As Needed for dizziness., Disp: , Rfl:   •  metoprolol tartrate (LOPRESSOR) 25 MG tablet, , Disp: , Rfl:   •  NIFEdipine XL (PROCARDIA XL) 60 MG 24 hr tablet, Take 60 mg by mouth Daily., Disp: , Rfl:   •  polyethylene glycol (MIRALAX) packet, Take 17 g by mouth Daily As Needed., Disp: , Rfl:   •  quinapril (ACCUPRIL) 20 MG tablet, Take 20 mg by mouth 2 (Two) Times a Day., Disp: , Rfl:   •  simvastatin (ZOCOR) 40 MG tablet, Take 40 mg by mouth Every Night., Disp: , Rfl:   •  vitamin B-12 (CYANOCOBALAMIN) 500 MCG tablet, Take 500 mcg by mouth Every Other Day., Disp: , Rfl:     Past Medical History:   Diagnosis Date   • Anemia    • Brain bleed (CMS/HCC)     TOOK SHOTS IN EYES    • Cancer of kidney (CMS/HCC)    • Cataract    • Colon polyp    • Constipation    • Elevated cholesterol    • Gout    • Hyperlipidemia    • Hypertension    • Incontinence    • Insomnia    • Nocturia    • SCC (squamous cell carcinoma), scalp/neck     right neck       Past Surgical History:   Procedure Laterality Date   • COLONOSCOPY     • HYSTERECTOMY     • JOINT REPLACEMENT Right    • KNEE SURGERY Right    • NEPHRECTOMY N/A 5/13/2020    Procedure: NEPHRECTOMY LAPAROSCOPIC WITH DAVINCI ROBOT;  Surgeon: Quinton Rainey MD;  [facility]: [facility];  Service: [facility];  Laterality: N/A;       Social History     Socioeconomic History   • Marital status: Legally      Spouse name: Not on file   • Number of children: Not on file   • Years of education: Not on file   • Highest education level:  Not on file   Tobacco Use   • Smoking status: Never Smoker   • Smokeless tobacco: Never Used   Substance and Sexual Activity   • Alcohol use: No   • Drug use: No   • Sexual activity: Defer       Family History   Problem Relation Age of Onset   • Heart disease Other    • Diabetes Other    • Hypertension Other    • Obesity Other    • Kidney disease Other          BP (!) 211/94 (BP Location: Right arm, Patient Position: Sitting)   Pulse 60   Temp 97.9 °F (36.6 °C)       Physical Exam  Constitutional: Patient is without distress or deformity.  Vital signs are reviewed as above.    Neuro: No confusion; No disorientation; Alert and oriented  Pulmonary: No respiratory distress.   Skin: No pallor or diaphoresis        Data  Results for orders placed or performed in visit on 11/24/20   POC Urinalysis Dipstick, Multipro    Specimen: Urine   Result Value Ref Range    Color Yellow Yellow, Straw, Dark Yellow, Kelly    Clarity, UA Clear Clear    Glucose, UA Negative Negative, 1000 mg/dL (3+) mg/dL    Bilirubin Negative Negative    Ketones, UA Negative Negative    Specific Gravity  1.015 1.005 - 1.030    Blood, UA Negative Negative    pH, Urine 7.0 5.0 - 8.0    Protein,  mg/dL (A) Negative mg/dL    Urobilinogen, UA Normal Normal    Nitrite, UA Negative Negative    Leukocytes Negative Negative         Imaging Results (Last 7 Days)     ** No results found for the last 168 hours. **      EXAMINATION:   CT ABDOMEN WO CONTRAST-  10/30/2020 9:56 AM CDT     HISTORY: Right nephrectomy     COMPARISON: March 14, 2020     Automatic exposure control utilized to decrease radiation dose.  Radiation  mg centimeters.     Images are obtained without intravenous or oral contrast. Images are  obtained from the diaphragm to the iliac crest.     The pelvis was not included on this exam.     Large hiatal hernia is present.     The liver demonstrates normal uniform attenuation.     The gallbladder is visualized.     Region the pancreas  is normal. Spleen is unremarkable. Vascular  calcifications are present abdominal aorta and splenic and iliac  arteries. The left renal contour is unremarkable. The right renal  contour is surgically absent.     Mild edema is noted in the subcutaneous tissues the anterior abdominal  wall.     The lumbar vertebra normally aligned.     IMPRESSION:  Status post right nephrectomy.  2. Large hiatal hernia  This report was finalized on 10/30/2020 09:59 by Dr. Dandre Archuleta MD.  .  These images were made available to me to review independently.  I also reviewed the radiologist's report described above with regard to the urologic findings.   /Quinton Rainey MD        Assessment and Plan  Diagnoses and all orders for this visit:    1. Clear cell renal cell carcinoma, right (CMS/HCC) (Primary)  -     POC Urinalysis Dipstick, Multipro    -Shows no evidence of recurrent disease.  CT is without adenopathy or abnormality in the renal contour.  Given no abnormality I do not feel she needs a contrasted study due to the risk of deterioration in renal function.  I will get another CT without contrast in 6 months.          (Please note that portions of this note were completed with a voice recognition program.)  Quinton Rainey MD  11/24/20  11:26 CST

## 2020-11-17 ENCOUNTER — RESULTS ENCOUNTER (OUTPATIENT)
Dept: UROLOGY | Facility: CLINIC | Age: 75
End: 2020-11-17

## 2020-11-17 DIAGNOSIS — C64.1 CLEAR CELL RENAL CELL CARCINOMA, RIGHT (HCC): ICD-10-CM

## 2020-11-24 ENCOUNTER — OFFICE VISIT (OUTPATIENT)
Dept: UROLOGY | Facility: CLINIC | Age: 75
End: 2020-11-24

## 2020-11-24 VITALS — HEART RATE: 60 BPM | TEMPERATURE: 97.9 F | DIASTOLIC BLOOD PRESSURE: 94 MMHG | SYSTOLIC BLOOD PRESSURE: 211 MMHG

## 2020-11-24 DIAGNOSIS — C64.1 CLEAR CELL RENAL CELL CARCINOMA, RIGHT (HCC): Primary | ICD-10-CM

## 2020-11-24 LAB
BILIRUB BLD-MCNC: NEGATIVE MG/DL
CLARITY, POC: CLEAR
COLOR UR: YELLOW
GLUCOSE UR STRIP-MCNC: NEGATIVE MG/DL
KETONES UR QL: NEGATIVE
LEUKOCYTE EST, POC: NEGATIVE
NITRITE UR-MCNC: NEGATIVE MG/ML
PH UR: 7 [PH] (ref 5–8)
PROT UR STRIP-MCNC: ABNORMAL MG/DL
RBC # UR STRIP: NEGATIVE /UL
SP GR UR: 1.01 (ref 1–1.03)
UROBILINOGEN UR QL: NORMAL

## 2020-11-24 PROCEDURE — 99213 OFFICE O/P EST LOW 20 MIN: CPT | Performed by: UROLOGY

## 2020-11-24 PROCEDURE — 81003 URINALYSIS AUTO W/O SCOPE: CPT | Performed by: UROLOGY

## 2021-02-17 NOTE — TELEPHONE ENCOUNTER
Sunni from pre work called and stated that the case request doesn't have laterality.  Can you please add this to the case request     Routing comment

## 2021-03-09 LAB
ALBUMIN SERPL-MCNC: 4 G/DL (ref 3.5–5.2)
ALP BLD-CCNC: 82 U/L (ref 35–104)
ALT SERPL-CCNC: 13 U/L (ref 5–33)
ANION GAP SERPL CALCULATED.3IONS-SCNC: 8 MMOL/L (ref 7–19)
AST SERPL-CCNC: 17 U/L (ref 5–32)
BASOPHILS ABSOLUTE: 0 K/UL (ref 0–0.2)
BASOPHILS RELATIVE PERCENT: 0.6 % (ref 0–1)
BILIRUB SERPL-MCNC: 0.3 MG/DL (ref 0.2–1.2)
BUN BLDV-MCNC: 22 MG/DL (ref 8–23)
CALCIUM SERPL-MCNC: 9.5 MG/DL (ref 8.8–10.2)
CHLORIDE BLD-SCNC: 102 MMOL/L (ref 98–111)
CO2: 28 MMOL/L (ref 22–29)
CREAT SERPL-MCNC: 1.5 MG/DL (ref 0.5–0.9)
CREATININE URINE: 229 MG/DL (ref 4.2–622)
EOSINOPHILS ABSOLUTE: 0.4 K/UL (ref 0–0.6)
EOSINOPHILS RELATIVE PERCENT: 7.4 % (ref 0–5)
GFR AFRICAN AMERICAN: 41
GFR NON-AFRICAN AMERICAN: 34
GLUCOSE BLD-MCNC: 78 MG/DL (ref 74–109)
HCT VFR BLD CALC: 43.2 % (ref 37–47)
HEMOGLOBIN: 13.7 G/DL (ref 12–16)
IMMATURE GRANULOCYTES #: 0 K/UL
LYMPHOCYTES ABSOLUTE: 0.9 K/UL (ref 1.1–4.5)
LYMPHOCYTES RELATIVE PERCENT: 16.6 % (ref 20–40)
MCH RBC QN AUTO: 30.3 PG (ref 27–31)
MCHC RBC AUTO-ENTMCNC: 31.7 G/DL (ref 33–37)
MCV RBC AUTO: 95.6 FL (ref 81–99)
MONOCYTES ABSOLUTE: 0.6 K/UL (ref 0–0.9)
MONOCYTES RELATIVE PERCENT: 10.8 % (ref 0–10)
NEUTROPHILS ABSOLUTE: 3.3 K/UL (ref 1.5–7.5)
NEUTROPHILS RELATIVE PERCENT: 64.2 % (ref 50–65)
PARATHYROID HORMONE INTACT: 72.9 PG/ML (ref 15–65)
PDW BLD-RTO: 13.4 % (ref 11.5–14.5)
PLATELET # BLD: 193 K/UL (ref 130–400)
PMV BLD AUTO: 11 FL (ref 9.4–12.3)
POTASSIUM SERPL-SCNC: 5 MMOL/L (ref 3.5–5)
PROTEIN PROTEIN: 173 MG/DL (ref 15–45)
RBC # BLD: 4.52 M/UL (ref 4.2–5.4)
SODIUM BLD-SCNC: 138 MMOL/L (ref 136–145)
TOTAL PROTEIN: 7.2 G/DL (ref 6.6–8.7)
WBC # BLD: 5.1 K/UL (ref 4.8–10.8)

## 2021-05-20 ENCOUNTER — HOSPITAL ENCOUNTER (OUTPATIENT)
Dept: CT IMAGING | Facility: HOSPITAL | Age: 76
Discharge: HOME | End: 2021-05-20
Admitting: UROLOGY

## 2021-05-20 DIAGNOSIS — C64.1 CLEAR CELL RENAL CELL CARCINOMA, RIGHT (HCC): ICD-10-CM

## 2021-05-20 PROCEDURE — 74150 CT ABDOMEN W/O CONTRAST: CPT

## 2021-05-25 ENCOUNTER — OFFICE VISIT (OUTPATIENT)
Dept: UROLOGY | Facility: CLINIC | Age: 76
End: 2021-05-25

## 2021-05-25 VITALS — HEIGHT: 61 IN | WEIGHT: 135 LBS | TEMPERATURE: 97.6 F | BODY MASS INDEX: 25.49 KG/M2

## 2021-05-25 DIAGNOSIS — C64.1 CLEAR CELL RENAL CELL CARCINOMA, RIGHT (HCC): Primary | ICD-10-CM

## 2021-05-25 LAB
BILIRUB BLD-MCNC: NEGATIVE MG/DL
CLARITY, POC: CLEAR
COLOR UR: YELLOW
GLUCOSE UR STRIP-MCNC: NEGATIVE MG/DL
KETONES UR QL: NEGATIVE
LEUKOCYTE EST, POC: ABNORMAL
NITRITE UR-MCNC: NEGATIVE MG/ML
PH UR: 6 [PH] (ref 5–8)
PROT UR STRIP-MCNC: ABNORMAL MG/DL
RBC # UR STRIP: ABNORMAL /UL
SP GR UR: 1.02 (ref 1–1.03)
UROBILINOGEN UR QL: NORMAL

## 2021-05-25 PROCEDURE — 99213 OFFICE O/P EST LOW 20 MIN: CPT | Performed by: UROLOGY

## 2021-05-25 PROCEDURE — 81003 URINALYSIS AUTO W/O SCOPE: CPT | Performed by: UROLOGY

## 2021-05-25 RX ORDER — NIFEDIPINE 90 MG/1
TABLET, EXTENDED RELEASE ORAL
COMMUNITY
Start: 2021-03-01 | End: 2021-07-12

## 2021-07-06 ENCOUNTER — HOSPITAL ENCOUNTER (OUTPATIENT)
Dept: ULTRASOUND IMAGING | Facility: HOSPITAL | Age: 76
End: 2021-07-06

## 2021-07-09 ENCOUNTER — HOSPITAL ENCOUNTER (OUTPATIENT)
Dept: ULTRASOUND IMAGING | Facility: HOSPITAL | Age: 76
Discharge: HOME | End: 2021-07-09
Admitting: OTOLARYNGOLOGY

## 2021-07-09 DIAGNOSIS — E04.2 NONTOXIC MULTINODULAR GOITER: ICD-10-CM

## 2021-07-09 DIAGNOSIS — E04.1 THYROID NODULE: ICD-10-CM

## 2021-07-09 PROCEDURE — 76536 US EXAM OF HEAD AND NECK: CPT

## 2021-07-12 ENCOUNTER — OFFICE VISIT (OUTPATIENT)
Dept: OTOLARYNGOLOGY | Facility: CLINIC | Age: 76
End: 2021-07-12

## 2021-07-12 VITALS
HEART RATE: 72 BPM | DIASTOLIC BLOOD PRESSURE: 98 MMHG | TEMPERATURE: 97.5 F | SYSTOLIC BLOOD PRESSURE: 158 MMHG | BODY MASS INDEX: 26.83 KG/M2 | WEIGHT: 142 LBS

## 2021-07-12 DIAGNOSIS — E04.2 NONTOXIC MULTINODULAR GOITER: Primary | ICD-10-CM

## 2021-07-12 PROCEDURE — 99213 OFFICE O/P EST LOW 20 MIN: CPT | Performed by: EMERGENCY MEDICINE

## 2021-07-12 NOTE — ASSESSMENT & PLAN NOTE
Continue current management.  Continue to follow the thyroid with routine ultrasounds.  Call or return to clinic if increasing size of nodule or thyroid, trouble swallowing, noisy breathing, racing heartrate, or neck mass.

## 2021-07-12 NOTE — PROGRESS NOTES
JANE Thompson      Chief Complaint   Patient presents with   • Thyroid Problem       Subjective   History of Present Illness:  Neela Awad is a 75 y.o. female returns for follow up. The patient denies complaints. The patient has not had a neck mass, trouble swallowing or lymphadenopathy. The patient has not had  weight gain, fluid retention or trouble concentrating.     Objective   Vital Signs  Temp:  [97.5 °F (36.4 °C)] 97.5 °F (36.4 °C)  Heart Rate:  [72] 72  BP: (158)/(98) 158/98    Physical Exam  Constitutional:       Appearance: She is normal weight.   HENT:      Head: Normocephalic.   Neck:      Thyroid: No thyroid mass, thyromegaly or thyroid tenderness.      Trachea: Trachea and phonation normal.      Comments: Nodular thyroid  Musculoskeletal:      Cervical back: Full passive range of motion without pain, normal range of motion and neck supple.   Neurological:      Mental Status: She is alert.         Results Review:   I reviewed the patient's new clinical results.  No results for input(s): TSH, D6UHGIS, Y6WEGNH, TGRIA, THYROGLOBULN, THGAB in the last 40441 hours.    US Thyroid (07/09/2021 12:50) stable nodules       Assessment/Plan   Assessment:    1. Nontoxic multinodular goiter        Plan:          Return in about 1 year (around 7/12/2022) for Follow up with Ultrasound.     JANE Thompson  07/12/21  11:45 CDT

## 2021-07-12 NOTE — PATIENT INSTRUCTIONS
Continue current management.  Continue to follow the thyroid with routine ultrasounds.  Call or return to clinic if increasing size of nodule or thyroid, trouble swallowing, noisy breathing, racing heartrate, or neck mass.     CONTACT INFORMATION:  The main office phone number is [phone]. For emergencies after hours and on weekends, this number will convert over to our answering service and the on call provider will answer. Please try to keep non emergent phone calls/ questions to office hours 9am-5pm Monday through Friday.     [employer]  As an alternative, you can sign up and use the Epic MyChart system for more direct and quicker access for non emergent questions/ problems.  amSTATZ [facility] [employer] allows you to send messages to your doctor, view your test results, renew your prescriptions, schedule appointments, and more. To sign up, go to [employer] and click on the Sign Up Now link in the New User? box. Enter your [employer] Activation Code exactly as it appears below along with the last four digits of your Social Security Number and your Date of Birth () to complete the sign-up process. If you do not sign up before the expiration date, you must request a new code.    [employer] Activation Code: [email or web address]  Expires: 2021 12:03 PM    If you have questions, you can email [email or web address] or call [phone] to talk to our [employer] staff. Remember, [employer] is NOT to be used for urgent needs. For medical emergencies, dial 911.

## 2021-09-27 LAB
ALBUMIN SERPL-MCNC: 4.3 G/DL (ref 3.5–5.2)
ALP BLD-CCNC: 80 U/L (ref 35–104)
ALT SERPL-CCNC: 14 U/L (ref 5–33)
ANION GAP SERPL CALCULATED.3IONS-SCNC: 10 MMOL/L (ref 7–19)
AST SERPL-CCNC: 16 U/L (ref 5–32)
BACTERIA: NEGATIVE /HPF
BASOPHILS ABSOLUTE: 0.1 K/UL (ref 0–0.2)
BASOPHILS RELATIVE PERCENT: 0.6 % (ref 0–1)
BILIRUB SERPL-MCNC: 0.4 MG/DL (ref 0.2–1.2)
BILIRUBIN URINE: NEGATIVE
BLOOD, URINE: NEGATIVE
BUN BLDV-MCNC: 23 MG/DL (ref 8–23)
CALCIUM SERPL-MCNC: 10 MG/DL (ref 8.8–10.2)
CHLORIDE BLD-SCNC: 99 MMOL/L (ref 98–111)
CLARITY: CLEAR
CO2: 29 MMOL/L (ref 22–29)
COLOR: YELLOW
CREAT SERPL-MCNC: 1.6 MG/DL (ref 0.5–0.9)
CREATININE URINE: 142.3 MG/DL (ref 4.2–622)
CRYSTALS, UA: ABNORMAL /HPF
EOSINOPHILS ABSOLUTE: 0.4 K/UL (ref 0–0.6)
EOSINOPHILS RELATIVE PERCENT: 4.2 % (ref 0–5)
EPITHELIAL CELLS, UA: 6 /HPF (ref 0–5)
GFR AFRICAN AMERICAN: 38
GFR NON-AFRICAN AMERICAN: 31
GLUCOSE BLD-MCNC: 90 MG/DL (ref 74–109)
GLUCOSE URINE: NEGATIVE MG/DL
HCT VFR BLD CALC: 44.1 % (ref 37–47)
HEMOGLOBIN: 14 G/DL (ref 12–16)
HYALINE CASTS: 1 /HPF (ref 0–8)
IMMATURE GRANULOCYTES #: 0 K/UL
KETONES, URINE: NEGATIVE MG/DL
LEUKOCYTE ESTERASE, URINE: ABNORMAL
LYMPHOCYTES ABSOLUTE: 0.9 K/UL (ref 1.1–4.5)
LYMPHOCYTES RELATIVE PERCENT: 11.3 % (ref 20–40)
MCH RBC QN AUTO: 30.4 PG (ref 27–31)
MCHC RBC AUTO-ENTMCNC: 31.7 G/DL (ref 33–37)
MCV RBC AUTO: 95.7 FL (ref 81–99)
MONOCYTES ABSOLUTE: 0.8 K/UL (ref 0–0.9)
MONOCYTES RELATIVE PERCENT: 9.7 % (ref 0–10)
NEUTROPHILS ABSOLUTE: 6.1 K/UL (ref 1.5–7.5)
NEUTROPHILS RELATIVE PERCENT: 73.7 % (ref 50–65)
NITRITE, URINE: NEGATIVE
PARATHYROID HORMONE INTACT: 57.3 PG/ML (ref 15–65)
PDW BLD-RTO: 13.4 % (ref 11.5–14.5)
PH UA: 7 (ref 5–8)
PLATELET # BLD: 201 K/UL (ref 130–400)
PMV BLD AUTO: 10.5 FL (ref 9.4–12.3)
POTASSIUM SERPL-SCNC: 5 MMOL/L (ref 3.5–5)
PROTEIN PROTEIN: 94 MG/DL (ref 15–45)
PROTEIN UA: 100 MG/DL
RBC # BLD: 4.61 M/UL (ref 4.2–5.4)
RBC UA: 3 /HPF (ref 0–4)
SODIUM BLD-SCNC: 138 MMOL/L (ref 136–145)
SPECIFIC GRAVITY UA: 1.02 (ref 1–1.03)
TOTAL PROTEIN: 7.3 G/DL (ref 6.6–8.7)
UROBILINOGEN, URINE: 0.2 E.U./DL
WBC # BLD: 8.2 K/UL (ref 4.8–10.8)
WBC UA: 5 /HPF (ref 0–5)

## 2021-11-22 ENCOUNTER — APPOINTMENT (OUTPATIENT)
Dept: CT IMAGING | Facility: HOSPITAL | Age: 76
End: 2021-11-22

## 2021-12-09 ENCOUNTER — OFFICE VISIT (OUTPATIENT)
Dept: UROLOGY | Facility: CLINIC | Age: 76
End: 2021-12-09

## 2021-12-09 ENCOUNTER — HOSPITAL ENCOUNTER (OUTPATIENT)
Dept: CT IMAGING | Facility: HOSPITAL | Age: 76
Discharge: HOME | End: 2021-12-09
Admitting: UROLOGY

## 2021-12-09 VITALS — WEIGHT: 139 LBS | TEMPERATURE: 98.1 F | HEIGHT: 61 IN | BODY MASS INDEX: 26.24 KG/M2

## 2021-12-09 DIAGNOSIS — C64.1 CLEAR CELL RENAL CELL CARCINOMA, RIGHT (HCC): ICD-10-CM

## 2021-12-09 DIAGNOSIS — C64.1 CLEAR CELL RENAL CELL CARCINOMA, RIGHT (HCC): Primary | ICD-10-CM

## 2021-12-09 DIAGNOSIS — N18.32 STAGE 3B CHRONIC KIDNEY DISEASE (HCC): ICD-10-CM

## 2021-12-09 LAB
BILIRUB BLD-MCNC: NEGATIVE MG/DL
CLARITY, POC: CLEAR
COLOR UR: YELLOW
GLUCOSE UR STRIP-MCNC: NEGATIVE MG/DL
KETONES UR QL: NEGATIVE
LEUKOCYTE EST, POC: ABNORMAL
NITRITE UR-MCNC: NEGATIVE MG/ML
PH UR: 5.5 [PH] (ref 5–8)
PROT UR STRIP-MCNC: ABNORMAL MG/DL
RBC # UR STRIP: NEGATIVE /UL
SP GR UR: 1.02 (ref 1–1.03)
UROBILINOGEN UR QL: NORMAL

## 2021-12-09 PROCEDURE — 81003 URINALYSIS AUTO W/O SCOPE: CPT | Performed by: UROLOGY

## 2021-12-09 PROCEDURE — 74150 CT ABDOMEN W/O CONTRAST: CPT

## 2021-12-09 PROCEDURE — 99213 OFFICE O/P EST LOW 20 MIN: CPT | Performed by: UROLOGY

## 2021-12-09 NOTE — PROGRESS NOTES
Chief Complaint  Kidney cancer    Subjective          Neela Awad presents to [facility] for follow-up of renal cell carcinoma.  She had clear-cell renal cell carcinoma of the right kidney.  She had T1b disease diagnosed in May 2020.  She underwent a right robot-assisted laparoscopic nephrectomy by me.  She denies any night sweats, flank pain musculoskeletal pain of nonarthritic origin, hematuria, or any noticeable adenopathy.      Current Outpatient Medications:   •  aspirin 81 MG EC tablet, Take 81 mg by mouth Daily., Disp: , Rfl:   •  calcium carbonate (OS-EMILIANA) 600 MG tablet, Take 600 mg by mouth Daily., Disp: , Rfl:   •  colchicine-probenecid (COL-BENEMID) 0.5-500 MG tablet, Take 1 tablet by mouth Daily., Disp: , Rfl:   •  ferrous sulfate 325 (65 FE) MG tablet, Take 325 mg by mouth Daily With Breakfast., Disp: , Rfl:   •  Garlic 1000 MG capsule, Take  by mouth Daily., Disp: , Rfl:   •  meclizine (ANTIVERT) 25 MG tablet, Take 25 mg by mouth 3 (Three) Times a Day As Needed for dizziness., Disp: , Rfl:   •  metoprolol tartrate (LOPRESSOR) 25 MG tablet, , Disp: , Rfl:   •  NIFEdipine XL (PROCARDIA XL) 60 MG 24 hr tablet, Take 60 mg by mouth Daily., Disp: , Rfl:   •  polyethylene glycol (MIRALAX) packet, Take 17 g by mouth Daily As Needed., Disp: , Rfl:   •  quinapril (ACCUPRIL) 20 MG tablet, Take 20 mg by mouth 2 (Two) Times a Day., Disp: , Rfl:   •  simvastatin (ZOCOR) 40 MG tablet, Take 40 mg by mouth Every Night., Disp: , Rfl:   •  vitamin B-12 (CYANOCOBALAMIN) 500 MCG tablet, Take 500 mcg by mouth Every Other Day., Disp: , Rfl:   Past Medical History:   Diagnosis Date   • Anemia    • Brain bleed (HCC)     TOOK SHOTS IN EYES    • Cancer of kidney (HCC)    • Cataract    • Colon polyp    • Constipation    • Elevated cholesterol    • Gout    • Hyperlipidemia    • Hypertension    • Incontinence    • Insomnia    • Nocturia    • SCC (squamous cell carcinoma), scalp/neck     right neck  "    Past Surgical History:   Procedure Laterality Date   • COLONOSCOPY     • HYSTERECTOMY     • JOINT REPLACEMENT Right    • KNEE SURGERY Right    • NEPHRECTOMY N/A 5/13/2020    Procedure: NEPHRECTOMY LAPAROSCOPIC WITH DAVINCI ROBOT;  Surgeon: Quinton Rainey MD;  Location: [facility];  Service: [facility];  Laterality: N/A;           Review  of systems  Constitutional: Negative for chills or fever.   Gastrointestinal: Negative for abdominal pain, anal bleeding or blood in stool.   Genitourinary: No flank pain or hematuria        Objective   PHYSICAL EXAM  Vital Signs:   Temp 98.1 °F (36.7 °C)   Ht 154.9 cm (61\")   Wt 63 kg (139 lb)   BMI 26.26 kg/m²     Constitutional: Patient is without distress or deformity.  Vital signs are reviewed as above.    Neuro: No confusion; No disorientation; Alert and oriented  Pulmonary: No respiratory distress.   Skin: No pallor or diaphoresis      DATA  Result Review :     CMP    CMP 2/12/21 3/9/21 9/27/21   Glucose  78 90   Glucose 218 (A)     BUN 27 (A) 22 23   Creatinine 1.78 (A) 1.5 (A) 1.6 (A)   eGFR Non African Am 27.7 (A) 34 (A) 31 (A)   eGFR  Am 33.5 (A) 41 (A) 38 (A)   Sodium 136 138 138   Potassium 3.4 (A) 5.0 5.0   Chloride 99 102 99   Calcium 8.8 9.5 10.0   Albumin 3.2 (A) 4 4.3   Total Bilirubin 0.2 0.3 0.4   Alkaline Phosphatase 74 82 80   AST (SGOT) 18 17 16   ALT (SGPT) 20 13 14   (A) Abnormal value       Comments are available for some flowsheets but are not being displayed.           CBC    CBC 3/9/21 9/27/21   WBC 5.1 8.2   RBC 4.52 4.61   Hemoglobin 13.7 14.0   Hematocrit 43.2 44.1   MCV 95.6 95.7   MCH 30.3 30.4   MCHC 31.7 (A) 31.7 (A)   RDW 13.4 13.4   Platelets 193 201   (A) Abnormal value            Data reviewed: Radiologic studies CT abdomen without contrast       Results for orders placed or performed in visit on 12/09/21   POC Urinalysis Dipstick, Multipro    Specimen: Urine   Result Value Ref Range    Color Yellow Yellow, Straw, Dark Yellow, " "Kelly    Clarity, UA Clear Clear    Glucose, UA Negative Negative, 1000 mg/dL (3+) mg/dL    Bilirubin Negative Negative    Ketones, UA Negative Negative    Specific Gravity  1.020 1.005 - 1.030    Blood, UA Negative Negative    pH, Urine 5.5 5.0 - 8.0    Protein,  mg/dL (A) Negative mg/dL    Urobilinogen, UA Normal Normal    Nitrite, UA Negative Negative    Leukocytes Small (1+) (A) Negative     CT abdomen wo contrast (12/09/2021 11:22)    The images for the above \"link(s)\" were made available to me to review independently.  I also reviewed the radiologist's report described above with regard to the urologic findings. My interpretation is as follows:  Study limited by lack of contrast. NO lesions noted.   /Quinton Rainey MD         ASSESSMENT AND PLAN          Problem List Items Addressed This Visit     None      Visit Diagnoses     Clear cell renal cell carcinoma, right (HCC)    -  Primary    Relevant Orders    POC Urinalysis Dipstick, Multipro (Completed)    Stage 3b chronic kidney disease (HCC)          NO evidence of recurrent disease. Limited in our studies due to lack of contrast.       FOLLOW UP     No follow-ups on file.        (Please note that portions of this note were completed with a voice recognition program.)  Quinton Rainey MD  12/09/21  14:16 CST    "

## 2022-03-21 ENCOUNTER — TRANSCRIBE ORDERS (OUTPATIENT)
Dept: ADMINISTRATIVE | Facility: HOSPITAL | Age: 77
End: 2022-03-21

## 2022-03-21 DIAGNOSIS — R07.89 DISCOMFORT IN CHEST: Primary | ICD-10-CM

## 2022-03-21 DIAGNOSIS — R06.02 SHORTNESS OF BREATH: ICD-10-CM

## 2022-03-25 ENCOUNTER — HOSPITAL ENCOUNTER (OUTPATIENT)
Dept: CT IMAGING | Facility: HOSPITAL | Age: 77
Discharge: HOME | End: 2022-03-25
Admitting: INTERNAL MEDICINE

## 2022-03-25 VITALS
HEIGHT: 61 IN | WEIGHT: 133.6 LBS | BODY MASS INDEX: 25.22 KG/M2 | HEART RATE: 60 BPM | RESPIRATION RATE: 18 BRPM | DIASTOLIC BLOOD PRESSURE: 97 MMHG | OXYGEN SATURATION: 98 % | TEMPERATURE: 97.8 F | SYSTOLIC BLOOD PRESSURE: 154 MMHG

## 2022-03-25 DIAGNOSIS — R06.02 SHORTNESS OF BREATH: ICD-10-CM

## 2022-03-25 DIAGNOSIS — R07.89 DISCOMFORT IN CHEST: ICD-10-CM

## 2022-03-25 LAB
CREAT SERPL-MCNC: 1.61 MG/DL (ref 0.57–1)
EGFRCR SERPLBLD CKD-EPI 2021: 32.8 ML/MIN/1.73

## 2022-03-25 PROCEDURE — 82565 ASSAY OF CREATININE: CPT | Performed by: INTERNAL MEDICINE

## 2022-03-25 RX ORDER — METOPROLOL TARTRATE 5 MG/5ML
5 INJECTION INTRAVENOUS
Status: DISCONTINUED | OUTPATIENT
Start: 2022-03-25 | End: 2022-03-26 | Stop reason: HOSPADM

## 2022-03-25 RX ORDER — METOPROLOL TARTRATE 100 MG/1
100 TABLET ORAL ONCE AS NEEDED
Status: DISCONTINUED | OUTPATIENT
Start: 2022-03-25 | End: 2022-03-26 | Stop reason: HOSPADM

## 2022-03-25 RX ORDER — METOPROLOL TARTRATE 50 MG/1
50 TABLET, FILM COATED ORAL ONCE AS NEEDED
Status: DISCONTINUED | OUTPATIENT
Start: 2022-03-25 | End: 2022-03-26 | Stop reason: HOSPADM

## 2022-03-25 RX ORDER — LIDOCAINE HYDROCHLORIDE 10 MG/ML
5 INJECTION, SOLUTION EPIDURAL; INFILTRATION; INTRACAUDAL; PERINEURAL AS NEEDED
Status: DISCONTINUED | OUTPATIENT
Start: 2022-03-25 | End: 2022-03-26 | Stop reason: HOSPADM

## 2022-03-25 RX ORDER — SODIUM CHLORIDE 0.9 % (FLUSH) 0.9 %
3 SYRINGE (ML) INJECTION EVERY 12 HOURS SCHEDULED
Status: DISCONTINUED | OUTPATIENT
Start: 2022-03-25 | End: 2022-03-26 | Stop reason: HOSPADM

## 2022-03-25 RX ORDER — SODIUM CHLORIDE 0.9 % (FLUSH) 0.9 %
10 SYRINGE (ML) INJECTION AS NEEDED
Status: DISCONTINUED | OUTPATIENT
Start: 2022-03-25 | End: 2022-03-26 | Stop reason: HOSPADM

## 2022-05-09 ENCOUNTER — TELEPHONE (OUTPATIENT)
Dept: UROLOGY | Facility: CLINIC | Age: 77
End: 2022-05-09

## 2022-05-09 DIAGNOSIS — C64.1 CLEAR CELL RENAL CELL CARCINOMA, RIGHT: Primary | ICD-10-CM

## 2022-05-09 DIAGNOSIS — N18.32 STAGE 3B CHRONIC KIDNEY DISEASE: ICD-10-CM

## 2022-05-09 NOTE — TELEPHONE ENCOUNTER
Caller: NIKKIE ARMIDA    Relationship: SELF     Best call back number: [phone]    What orders are you requesting (i.e. lab or imaging): CT ABDOMEN    In what timeframe would the patient need to come in: BEFORE APPT ON 6/14/22. PT WILL OUT OF TOWN 6/9/22 TO 6/12/22.     Where will you receive your lab/imaging services: [facility]     Additional notes:   PT HAS A F/U W/ DR. DAVIS ON 6/14/22 W/ CT ABDOMEN AND NEEDS ORDERS UPLOADED. PT WANTS TO GET CT DONE IN THE MORNING ON 6/9/22.

## 2022-05-11 ENCOUNTER — OFFICE VISIT (OUTPATIENT)
Dept: CARDIOLOGY | Facility: CLINIC | Age: 77
End: 2022-05-11

## 2022-05-11 VITALS
DIASTOLIC BLOOD PRESSURE: 80 MMHG | HEART RATE: 95 BPM | HEIGHT: 61 IN | WEIGHT: 132 LBS | OXYGEN SATURATION: 93 % | BODY MASS INDEX: 24.92 KG/M2 | SYSTOLIC BLOOD PRESSURE: 150 MMHG

## 2022-05-11 DIAGNOSIS — E78.2 MIXED HYPERLIPIDEMIA: ICD-10-CM

## 2022-05-11 DIAGNOSIS — I10 PRIMARY HYPERTENSION: ICD-10-CM

## 2022-05-11 DIAGNOSIS — G25.0 ESSENTIAL TREMOR: Primary | ICD-10-CM

## 2022-05-11 DIAGNOSIS — E04.2 NONTOXIC MULTINODULAR GOITER: Chronic | ICD-10-CM

## 2022-05-11 DIAGNOSIS — R06.09 DOE (DYSPNEA ON EXERTION): ICD-10-CM

## 2022-05-11 DIAGNOSIS — R94.31 ABNORMAL ECG: ICD-10-CM

## 2022-05-11 PROCEDURE — 93000 ELECTROCARDIOGRAM COMPLETE: CPT | Performed by: INTERNAL MEDICINE

## 2022-05-11 PROCEDURE — 99204 OFFICE O/P NEW MOD 45 MIN: CPT | Performed by: INTERNAL MEDICINE

## 2022-05-11 NOTE — PROGRESS NOTES
Neela Awad  [id number]  1945  76 y.o.  female    Referring Provider: Antonieta Soto MD    Reason for  Visit:    Initial visit for shortness of breath       Subjective    Mild chronic exertional shortness of breath on exertion relieved with rest  No significant cough or wheezing    No palpitations  No associated chest pain  No significant pedal edema    No fever or chills  No significant expectoration    No hemoptysis  No presyncope or syncope    Tolerating current medications well with no untoward side effects   Compliant with prescribed medication regimen. Tries to adhere to cardiac diet.     BP well controlled at home.     No bleeding, excessive bruising, gait instability or fall risks        History of present illness:  Neela Awad is a 76 y.o. yo female with essential hypertension  Renal cancer s/p nephrectomy 2 years ago  who presents today for   Chief Complaint   Patient presents with   • Establish Care     EVALUATION OF CTA CORONARY CREATININE - KIDNEY FUNCTION IS 40    .    History  Past Medical History:   Diagnosis Date   • Anemia    • Brain bleed (HCC)     TOOK SHOTS IN EYES    • Cancer of kidney (HCC)    • Cataract    • Colon polyp    • Constipation    • Elevated cholesterol    • Gout    • Hyperlipidemia    • Hypertension    • Incontinence    • Insomnia    • Nocturia    • SCC (squamous cell carcinoma), scalp/neck     right neck   ,   Past Surgical History:   Procedure Laterality Date   • COLONOSCOPY     • HYSTERECTOMY     • JOINT REPLACEMENT Right    • KNEE SURGERY Right    • NEPHRECTOMY N/A 5/13/2020    Procedure: NEPHRECTOMY LAPAROSCOPIC WITH DAVINCI ROBOT;  Surgeon: Quinton Rainey MD;  Location: [facility];  Service: [facility];  Laterality: N/A;   ,   Family History   Problem Relation Age of Onset   • Heart disease Other    • Diabetes Other    • Hypertension Other    • Obesity Other    • Kidney disease Other    ,   Social History     Tobacco Use   • Smoking status: Never Smoker   •  "Smokeless tobacco: Never Used   Vaping Use   • Vaping Use: Never used   Substance Use Topics   • Alcohol use: No   • Drug use: No   ,     Medications  Current Outpatient Medications   Medication Sig Dispense Refill   • aspirin 81 MG EC tablet Take 81 mg by mouth Daily.     • calcium carbonate (OS-EMILIANA) 600 MG tablet Take 600 mg by mouth Daily.     • colchicine-probenecid (COL-BENEMID) 0.5-500 MG tablet Take 1 tablet by mouth Daily.     • ferrous sulfate 325 (65 FE) MG tablet Take 325 mg by mouth Daily With Breakfast.     • Garlic 1000 MG capsule Take  by mouth Daily.     • meclizine (ANTIVERT) 25 MG tablet Take 25 mg by mouth 3 (Three) Times a Day As Needed for dizziness.     • metoprolol tartrate (LOPRESSOR) 25 MG tablet      • NIFEdipine XL (PROCARDIA XL) 60 MG 24 hr tablet Take 60 mg by mouth Daily.     • polyethylene glycol (MIRALAX) packet Take 17 g by mouth Daily As Needed.     • quinapril (ACCUPRIL) 20 MG tablet Take 20 mg by mouth 2 (Two) Times a Day.     • simvastatin (ZOCOR) 40 MG tablet Take 40 mg by mouth Every Night.     • vitamin B-12 (CYANOCOBALAMIN) 500 MCG tablet Take 500 mcg by mouth Every Other Day.       No current facility-administered medications for this visit.       Allergies:  Allopurinol and Morphine    Review of Systems  Review of Systems   Constitutional: Negative.   HENT: Negative.    Eyes: Negative.    Cardiovascular: Positive for dyspnea on exertion. Negative for chest pain, claudication, cyanosis, irregular heartbeat, leg swelling, near-syncope, orthopnea, palpitations, paroxysmal nocturnal dyspnea and syncope.   Respiratory: Negative.    Endocrine: Negative.    Hematologic/Lymphatic: Negative.    Skin: Negative.    Musculoskeletal: Positive for arthritis and back pain.   Gastrointestinal: Negative for anorexia.   Genitourinary: Negative.    Neurological: Negative.    Psychiatric/Behavioral: Negative.        Objective     Physical Exam:  /80   Pulse 95   Ht 154.9 cm (61\")   Wt " 59.9 kg (132 lb)   SpO2 93%   BMI 24.94 kg/m²     Physical Exam  Constitutional:       Appearance: Normal appearance.   HENT:      Head: Normocephalic.   Eyes:      General: Lids are normal.   Neck:      Vascular: No carotid bruit.   Cardiovascular:      Rate and Rhythm: Regular rhythm.      Heart sounds: S1 normal and S2 normal. Murmur heard.    Systolic murmur is present with a grade of 2/6.  Pulmonary:      Effort: Pulmonary effort is normal.   Abdominal:      Palpations: Abdomen is soft.   Neurological:      Mental Status: She is alert.   Psychiatric:         Speech: Speech normal.         Behavior: Behavior normal.         Thought Content: Thought content normal.         Results Review:     ____________________________________________________________________________________________________________________________________________  Health maintenance and recommendations    Low salt/ HTN/ Heart healthy carbohydrate restricted cardiac diet   The patient is advised to reduce or avoid caffeine or other cardiac stimulants.   Minimize or avoid  NSAID-type medications      Monitor for any signs of bleeding including red or dark stools. Fall precautions.   Advised staying uptodate with immunizations per established standard guidelines.    Offered to give patient  a copy of my notes     Questions were encouraged, asked and answered to the patient's  understanding and satisfaction. Questions if any regarding current medications and side effects, need for refills and importance of compliance to medications stressed.    Reviewed available prior notes, consults, prior visits, laboratory findings, radiology and cardiology relevant reports. Updated chart as applicable. I have reviewed the patient's medical history in detail and updated the computerized patient record as relevant.      Updated patient regarding any new or relevant abnormalities on review of records or any new findings on physical exam. Mentioned to patient  about purpose of visit and desirable health short and long term goals and objectives.    Primary to monitor CBC CMP Lipid panel and TSH as applicable    ___________________________________________________________________________________________________________________________________________     ECG 12 Lead    Date/Time: 5/11/2022 12:29 PM  Performed by: Les Saucedo MD  Authorized by: Les Saucedo MD   Comparison: compared with previous ECG from 3/14/2020  Similar to previous ECG  Rhythm: sinus bradycardia  Rate: bradycardic  Q waves: V1, V2 and V3    QRS axis: normal    Clinical impression: abnormal EKG            Assessment & Plan   Diagnoses and all orders for this visit:    1. Essential tremor (Primary)    2. Primary hypertension    3. Mixed hyperlipidemia    4. Abnormal ECG  -     Adult Transthoracic Echo Complete w/ Color, Spectral and Contrast if necessary per protocol; Future    5. Nontoxic multinodular goiter    6. LUNA (dyspnea on exertion)  -     Stress Test With Myocardial Perfusion (1 Day); Future    Other orders  -     ECG 12 Lead          Plan      Orders Placed This Encounter   Procedures   • Stress Test With Myocardial Perfusion (1 Day)     Standing Status:   Future     Standing Expiration Date:   5/11/2023     Order Specific Question:   What stress agent will be used?     Answer:   Regadenoson (Lexiscan)     Order Specific Question:   Difficulty walking criteria?     Answer:   Musculoskeletal (hips, knees, feet, back, amputee)     Order Specific Question:   Reason for exam?     Answer:   Other Reasons (uncertain in most circumstances)     Order Specific Question:   Other Reasons (uncertain in most circumstances)?     Answer:   High Suspicion of Cardiac Disease     Order Specific Question:   Release to patient     Answer:   Immediate   • ECG 12 Lead     This order was created via procedure documentation     Order Specific Question:   Release to patient     Answer:   Immediate   • Adult  Transthoracic Echo Complete w/ Color, Spectral and Contrast if necessary per protocol     Standing Status:   Future     Standing Expiration Date:   5/11/2023     Scheduling Instructions:      Myocardial strain to be performed during echocardiogram as long as technically feasible     Order Specific Question:   Reason for exam?     Answer:   Dyspnea     Order Specific Question:   Release to patient     Answer:   Immediate      Check BP and heart rates twice daily initially till blood pressures and heart rates under good control and then at least 3x / week,   If blood pressures continue to be well-controlled then can check week a month  at home and bring a recording for review next visit  If BP >130/85 or < 100/60 persistently over 3 reading 30 mins apart or if heart rates persistently above 100 bpm or less than 55 bpm call sooner for evaluation and advise     Follow up with Fatmata LARA , Joe LARA  or myself        Return in about 6 weeks (around 6/22/2022).

## 2022-06-09 ENCOUNTER — HOSPITAL ENCOUNTER (OUTPATIENT)
Dept: CT IMAGING | Facility: HOSPITAL | Age: 77
Discharge: HOME | End: 2022-06-09
Admitting: UROLOGY

## 2022-06-09 DIAGNOSIS — C64.1 CLEAR CELL RENAL CELL CARCINOMA, RIGHT: ICD-10-CM

## 2022-06-09 PROCEDURE — 74176 CT ABD & PELVIS W/O CONTRAST: CPT

## 2022-06-10 ENCOUNTER — APPOINTMENT (OUTPATIENT)
Dept: CT IMAGING | Facility: HOSPITAL | Age: 77
End: 2022-06-10

## 2022-06-10 NOTE — PROGRESS NOTES
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  Having undergone robot-assisted laparoscopic right radical nephrectomy in May 2020Op Note by Quinton Rainey MD (05/13/2020 11:03)   She continues to do well since his procedure.  She denies any hematuria, flank pain, night sweats or weight loss.        Current Outpatient Medications:   •  aspirin 81 MG EC tablet, Take 81 mg by mouth Daily., Disp: , Rfl:   •  calcium carbonate (OS-EMILIANA) 600 MG tablet, Take 600 mg by mouth Daily., Disp: , Rfl:   •  colchicine-probenecid (COL-BENEMID) 0.5-500 MG tablet, Take 1 tablet by mouth Daily., Disp: , Rfl:   •  ferrous sulfate 325 (65 FE) MG tablet, Take 325 mg by mouth Daily With Breakfast., Disp: , Rfl:   •  Garlic 1000 MG capsule, Take  by mouth Daily., Disp: , Rfl:   •  meclizine (ANTIVERT) 25 MG tablet, Take 25 mg by mouth 3 (Three) Times a Day As Needed for dizziness., Disp: , Rfl:   •  metoprolol tartrate (LOPRESSOR) 25 MG tablet, , Disp: , Rfl:   •  NIFEdipine XL (PROCARDIA XL) 60 MG 24 hr tablet, Take 60 mg by mouth Daily., Disp: , Rfl:   •  polyethylene glycol (MIRALAX) packet, Take 17 g by mouth Daily As Needed., Disp: , Rfl:   •  quinapril (ACCUPRIL) 20 MG tablet, Take 20 mg by mouth 2 (Two) Times a Day., Disp: , Rfl:   •  simvastatin (ZOCOR) 40 MG tablet, Take 40 mg by mouth Every Night., Disp: , Rfl:   •  vitamin B-12 (CYANOCOBALAMIN) 500 MCG tablet, Take 500 mcg by mouth Every Other Day., Disp: , Rfl:   Past Medical History:   Diagnosis Date   • Anemia    • Brain bleed (HCC)     TOOK SHOTS IN EYES    • Cancer of kidney (HCC)    • Cataract    • Colon polyp    • Constipation    • Elevated cholesterol    • Gout    • Hyperlipidemia    • Hypertension    • Incontinence    • Insomnia    • Nocturia    • SCC (squamous cell carcinoma), scalp/neck     right neck     Past Surgical  "History:   Procedure Laterality Date   • COLONOSCOPY     • HYSTERECTOMY     • JOINT REPLACEMENT Right    • KNEE SURGERY Right    • NEPHRECTOMY N/A 5/13/2020    Procedure: NEPHRECTOMY LAPAROSCOPIC WITH DAVINCI ROBOT;  Surgeon: Quinton Rainey MD;  Location: [facility];  Service: [facility];  Laterality: N/A;           Review  of systems  Constitutional: Negative for chills or fever.   Gastrointestinal: Negative for abdominal pain, anal bleeding or blood in stool.           Objective   PHYSICAL EXAM  Vital Signs:   Temp 96.5 °F (35.8 °C) (Temporal)   Ht 154.9 cm (61\")   Wt 59.5 kg (131 lb 3.2 oz)   BMI 24.79 kg/m²     Constitutional: Patient is without distress or deformity.  Vital signs are reviewed as above.    Neuro: No confusion; No disorientation; Alert and oriented  Pulmonary: No respiratory distress.   Skin: No pallor or diaphoresis      DATA  Result Review :              Results for orders placed or performed in visit on 06/14/22   POC Urinalysis Dipstick, Multipro    Specimen: Urine   Result Value Ref Range    Color Yellow Yellow, Straw, Dark Yellow, Kelly    Clarity, UA Clear Clear    Glucose, UA Negative Negative, 1000 mg/dL (3+) mg/dL    Bilirubin Negative Negative    Ketones, UA Negative Negative    Specific Gravity  1.015 1.005 - 1.030    Blood, UA Trace (A) Negative    pH, Urine 5.5 5.0 - 8.0    Protein,  mg/dL (A) Negative mg/dL    Urobilinogen, UA Normal Normal    Nitrite, UA Negative Negative    Leukocytes Large (3+) (A) Negative       CT Abdomen Pelvis Without Contrast (06/09/2022 09:10)    The images for the above \"link(s)\" were made available to me to review independently.  I also reviewed the radiologist's report described above with regard to the urologic findings. My interpretation is as follows:  Left kidney is without significant renal lesion.  The right renal fossa shows no evidence of local recurrence.  There is no significant retroperitoneal adenopathy.  Large Bochdalek hernia " noted.  /Quinton Rainey MD         ASSESSMENT AND PLAN          Problem List Items Addressed This Visit    None     Visit Diagnoses     Clear cell renal cell carcinoma, right (HCC)    -  Primary    Relevant Orders    POC Urinalysis Dipstick, Multipro (Completed)    Hiatal hernia without gangrene and obstruction          - No evidence of recurrent disease  -Has mild post prandial satiety           FOLLOW UP     Return in about 6 months (around 12/14/2022).        (Please note that portions of this note were completed with a voice recognition program.)  Quinton Rainey MD  06/14/22  10:39 CDT

## 2022-06-14 ENCOUNTER — OFFICE VISIT (OUTPATIENT)
Dept: UROLOGY | Facility: CLINIC | Age: 77
End: 2022-06-14

## 2022-06-14 VITALS — BODY MASS INDEX: 24.77 KG/M2 | HEIGHT: 61 IN | TEMPERATURE: 96.5 F | WEIGHT: 131.2 LBS

## 2022-06-14 DIAGNOSIS — C64.1 CLEAR CELL RENAL CELL CARCINOMA, RIGHT: Primary | ICD-10-CM

## 2022-06-14 DIAGNOSIS — K44.9 HIATAL HERNIA WITHOUT GANGRENE AND OBSTRUCTION: ICD-10-CM

## 2022-06-14 LAB
BILIRUB BLD-MCNC: NEGATIVE MG/DL
CLARITY, POC: CLEAR
COLOR UR: YELLOW
GLUCOSE UR STRIP-MCNC: NEGATIVE MG/DL
KETONES UR QL: NEGATIVE
LEUKOCYTE EST, POC: ABNORMAL
NITRITE UR-MCNC: NEGATIVE MG/ML
PH UR: 5.5 [PH] (ref 5–8)
PROT UR STRIP-MCNC: ABNORMAL MG/DL
RBC # UR STRIP: ABNORMAL /UL
SP GR UR: 1.01 (ref 1–1.03)
UROBILINOGEN UR QL: NORMAL

## 2022-06-14 PROCEDURE — 99213 OFFICE O/P EST LOW 20 MIN: CPT | Performed by: UROLOGY

## 2022-06-14 PROCEDURE — 81003 URINALYSIS AUTO W/O SCOPE: CPT | Performed by: UROLOGY

## 2022-07-07 ENCOUNTER — HOSPITAL ENCOUNTER (OUTPATIENT)
Dept: CARDIOLOGY | Facility: HOSPITAL | Age: 77
Discharge: HOME | End: 2022-07-07

## 2022-07-07 VITALS
SYSTOLIC BLOOD PRESSURE: 153 MMHG | DIASTOLIC BLOOD PRESSURE: 75 MMHG | BODY MASS INDEX: 24.77 KG/M2 | HEIGHT: 61 IN | HEART RATE: 56 BPM | WEIGHT: 131.17 LBS

## 2022-07-07 DIAGNOSIS — R06.09 DOE (DYSPNEA ON EXERTION): ICD-10-CM

## 2022-07-07 DIAGNOSIS — R94.31 ABNORMAL ECG: ICD-10-CM

## 2022-07-07 LAB
BH CV REST NUCLEAR ISOTOPE DOSE: 10.7 MCI
BH CV STRESS BP STAGE 1: NORMAL
BH CV STRESS COMMENTS STAGE 1: NORMAL
BH CV STRESS DOSE REGADENOSON STAGE 1: 0.4
BH CV STRESS DURATION MIN STAGE 1: 0
BH CV STRESS DURATION SEC STAGE 1: 10
BH CV STRESS HR STAGE 1: 83
BH CV STRESS NUCLEAR ISOTOPE DOSE: 34.6 MCI
BH CV STRESS PROTOCOL 1: NORMAL
BH CV STRESS RECOVERY BP: NORMAL MMHG
BH CV STRESS RECOVERY HR: 76 BPM
BH CV STRESS STAGE 1: 1
LV EF NUC BP: 72 %
MAXIMAL PREDICTED HEART RATE: 143 BPM
PERCENT MAX PREDICTED HR: 58.04 %
STRESS BASELINE BP: NORMAL MMHG
STRESS BASELINE HR: 58 BPM
STRESS PERCENT HR: 68 %
STRESS POST EXERCISE DUR SEC: 10 SEC
STRESS POST PEAK BP: NORMAL MMHG
STRESS POST PEAK HR: 83 BPM
STRESS TARGET HR: 122 BPM

## 2022-07-07 PROCEDURE — 25010000002 REGADENOSON 0.4 MG/5ML SOLUTION: Performed by: INTERNAL MEDICINE

## 2022-07-07 PROCEDURE — 93356 MYOCRD STRAIN IMG SPCKL TRCK: CPT | Performed by: INTERNAL MEDICINE

## 2022-07-07 PROCEDURE — 93306 TTE W/DOPPLER COMPLETE: CPT | Performed by: INTERNAL MEDICINE

## 2022-07-07 PROCEDURE — 0 TECHNETIUM SESTAMIBI: Performed by: INTERNAL MEDICINE

## 2022-07-07 PROCEDURE — 93018 CV STRESS TEST I&R ONLY: CPT | Performed by: INTERNAL MEDICINE

## 2022-07-07 PROCEDURE — 78452 HT MUSCLE IMAGE SPECT MULT: CPT | Performed by: INTERNAL MEDICINE

## 2022-07-07 PROCEDURE — 78452 HT MUSCLE IMAGE SPECT MULT: CPT

## 2022-07-07 PROCEDURE — 93306 TTE W/DOPPLER COMPLETE: CPT

## 2022-07-07 PROCEDURE — 93017 CV STRESS TEST TRACING ONLY: CPT

## 2022-07-07 PROCEDURE — 93356 MYOCRD STRAIN IMG SPCKL TRCK: CPT

## 2022-07-07 PROCEDURE — A9500 TC99M SESTAMIBI: HCPCS | Performed by: INTERNAL MEDICINE

## 2022-07-07 PROCEDURE — 25010000002 PERFLUTREN 6.52 MG/ML SUSPENSION: Performed by: INTERNAL MEDICINE

## 2022-07-07 RX ADMIN — TECHNETIUM TC 99M SESTAMIBI 1 DOSE: 1 INJECTION INTRAVENOUS at 11:15

## 2022-07-07 RX ADMIN — REGADENOSON 0.4 MG: 0.08 INJECTION, SOLUTION INTRAVENOUS at 11:17

## 2022-07-07 RX ADMIN — TECHNETIUM TC 99M SESTAMIBI 1 DOSE: 1 INJECTION INTRAVENOUS at 09:50

## 2022-07-07 RX ADMIN — PERFLUTREN 1.17 MG: 6.52 INJECTION, SUSPENSION INTRAVENOUS at 10:07

## 2022-07-09 LAB
BH CV ECHO LEFT VENTRICLE GLOBAL LONGITUDINAL STRAIN: -21 %
BH CV ECHO MEAS - AO MAX PG: 8 MMHG
BH CV ECHO MEAS - AO MEAN PG: 4 MMHG
BH CV ECHO MEAS - AO ROOT DIAM: 3.3 CM
BH CV ECHO MEAS - AO V2 MAX: 141 CM/SEC
BH CV ECHO MEAS - AO V2 VTI: 31.6 CM
BH CV ECHO MEAS - AVA(I,D): 2.6 CM2
BH CV ECHO MEAS - EDV(CUBED): 82.3 ML
BH CV ECHO MEAS - EDV(MOD-SP2): 77.4 ML
BH CV ECHO MEAS - EDV(MOD-SP4): 68.8 ML
BH CV ECHO MEAS - EF(MOD-BP): 66.4 %
BH CV ECHO MEAS - EF(MOD-SP2): 64 %
BH CV ECHO MEAS - EF(MOD-SP4): 71.1 %
BH CV ECHO MEAS - ESV(CUBED): 8.5 ML
BH CV ECHO MEAS - ESV(MOD-SP2): 27.9 ML
BH CV ECHO MEAS - ESV(MOD-SP4): 19.9 ML
BH CV ECHO MEAS - FS: 53.1 %
BH CV ECHO MEAS - IVS/LVPW: 0.78 CM
BH CV ECHO MEAS - IVSD: 0.62 CM
BH CV ECHO MEAS - LA DIMENSION: 2.8 CM
BH CV ECHO MEAS - LAT PEAK E' VEL: 4.1 CM/SEC
BH CV ECHO MEAS - LV DIASTOLIC VOL/BSA (35-75): 43.6 CM2
BH CV ECHO MEAS - LV MASS(C)D: 92 GRAMS
BH CV ECHO MEAS - LV MAX PG: 2.7 MMHG
BH CV ECHO MEAS - LV MEAN PG: 1 MMHG
BH CV ECHO MEAS - LV SYSTOLIC VOL/BSA (12-30): 12.6 CM2
BH CV ECHO MEAS - LV V1 MAX: 82 CM/SEC
BH CV ECHO MEAS - LV V1 VTI: 23.5 CM
BH CV ECHO MEAS - LVIDD: 4.4 CM
BH CV ECHO MEAS - LVIDS: 2.04 CM
BH CV ECHO MEAS - LVOT AREA: 3.5 CM2
BH CV ECHO MEAS - LVOT DIAM: 2.1 CM
BH CV ECHO MEAS - LVPWD: 0.8 CM
BH CV ECHO MEAS - MED PEAK E' VEL: 3.7 CM/SEC
BH CV ECHO MEAS - MV A MAX VEL: 108.4 CM/SEC
BH CV ECHO MEAS - MV DEC TIME: 0.23 MSEC
BH CV ECHO MEAS - MV E MAX VEL: 91.9 CM/SEC
BH CV ECHO MEAS - MV E/A: 0.85
BH CV ECHO MEAS - PI END-D VEL: 157 CM/SEC
BH CV ECHO MEAS - RVSP: 36 MMHG
BH CV ECHO MEAS - SI(MOD-SP2): 31.4 ML/M2
BH CV ECHO MEAS - SI(MOD-SP4): 31 ML/M2
BH CV ECHO MEAS - SV(LVOT): 81.4 ML
BH CV ECHO MEAS - SV(MOD-SP2): 49.5 ML
BH CV ECHO MEAS - SV(MOD-SP4): 48.9 ML
BH CV ECHO MEAS - TR MAX PG: 30.9 MMHG
BH CV ECHO MEAS - TR MAX VEL: 278 CM/SEC
BH CV ECHO MEASUREMENTS AVERAGE E/E' RATIO: 23.56
LEFT ATRIUM VOLUME INDEX: 26.2 ML/M2
LEFT ATRIUM VOLUME: 41.1 ML
MAXIMAL PREDICTED HEART RATE: 143 BPM
STRESS TARGET HR: 122 BPM

## 2022-07-12 ENCOUNTER — OFFICE VISIT (OUTPATIENT)
Dept: CARDIOLOGY | Facility: CLINIC | Age: 77
End: 2022-07-12

## 2022-07-12 ENCOUNTER — OFFICE VISIT (OUTPATIENT)
Dept: OTOLARYNGOLOGY | Facility: CLINIC | Age: 77
End: 2022-07-12

## 2022-07-12 VITALS — TEMPERATURE: 97.8 F | BODY MASS INDEX: 24.13 KG/M2 | WEIGHT: 127.8 LBS | HEIGHT: 61 IN

## 2022-07-12 VITALS
HEART RATE: 67 BPM | BODY MASS INDEX: 24.35 KG/M2 | OXYGEN SATURATION: 98 % | DIASTOLIC BLOOD PRESSURE: 80 MMHG | HEIGHT: 61 IN | WEIGHT: 129 LBS | SYSTOLIC BLOOD PRESSURE: 140 MMHG

## 2022-07-12 DIAGNOSIS — I10 PRIMARY HYPERTENSION: Primary | ICD-10-CM

## 2022-07-12 DIAGNOSIS — E78.2 MIXED HYPERLIPIDEMIA: ICD-10-CM

## 2022-07-12 DIAGNOSIS — C64.1 CARCINOMA OF RIGHT KIDNEY: ICD-10-CM

## 2022-07-12 DIAGNOSIS — R06.09 DOE (DYSPNEA ON EXERTION): ICD-10-CM

## 2022-07-12 DIAGNOSIS — E04.2 NONTOXIC MULTINODULAR GOITER: Primary | ICD-10-CM

## 2022-07-12 PROCEDURE — 99213 OFFICE O/P EST LOW 20 MIN: CPT | Performed by: EMERGENCY MEDICINE

## 2022-07-12 PROCEDURE — 99214 OFFICE O/P EST MOD 30 MIN: CPT | Performed by: NURSE PRACTITIONER

## 2022-07-12 NOTE — PROGRESS NOTES
[facility]  [address], [address]  [place] [address]  Fax [phone]  Phone [phone]      Visit Type: FOLLOW UP   Chief Complaint   Patient presents with   • Thyroid Problem        HPI  She presents for a follow up evaluation. She has had no current complaints. The patient has not had complaints of: comression symptoms, dysphagia, neck tightness and a visable thyroid enlargement.     Past Medical History:   Diagnosis Date   • Anemia    • Brain bleed (HCC)     TOOK SHOTS IN EYES    • Cancer of kidney (HCC)    • Cataract    • Colon polyp    • Constipation    • Elevated cholesterol    • Gout    • Hyperlipidemia    • Hypertension    • Incontinence    • Insomnia    • Nocturia    • SCC (squamous cell carcinoma), scalp/neck     right neck       Past Surgical History:   Procedure Laterality Date   • COLONOSCOPY     • HYSTERECTOMY     • JOINT REPLACEMENT Right    • KNEE SURGERY Right    • NEPHRECTOMY N/A 5/13/2020    Procedure: NEPHRECTOMY LAPAROSCOPIC WITH DAVINCI ROBOT;  Surgeon: Quinton Rainey MD;  Location: [facility];  Service: [facility];  Laterality: N/A;       Family History: Her family history includes Diabetes in an other family member; Heart disease in an other family member; Hypertension in an other family member; Kidney disease in an other family member; Obesity in an other family member.     Social History: She  reports that she has never smoked. She has never used smokeless tobacco. She reports that she does not drink alcohol and does not use drugs.    Home Medications:  Garlic, NIFEdipine XL, aspirin, calcium carbonate, colchicine-probenecid, ferrous sulfate, meclizine, metoprolol tartrate, polyethylene glycol, quinapril, simvastatin, and vitamin B-12    Allergies:  She is allergic to allopurinol and morphine.       Vital Signs:   Temp:  [97.8 °F (36.6 °C)] 97.8 °F (36.6 °C)  ENT Physical  Exam  Constitutional  Appearance: patient appears well-developed, well-nourished and well-groomed,  Communication/Voice: communication appropriate for developmental age; vocal quality normal;  Head and Face  Appearance: head appears normal, face appears normal and face appears atraumatic;  Palpation: facial palpation normal;  Salivary: glands normal;  Ear  Hearing: intact;  Auricles: right auricle normal; left auricle normal;  External Mastoids: right external mastoid normal; left external mastoid normal;  Ear Canals: right ear canal normal; left ear canal normal;  Tympanic Membranes: right tympanic membrane normal; left tympanic membrane normal;  Nose  External Nose: nares patent bilaterally; external nose normal;  Internal Nose: nasal mucosa normal; septum normal; bilateral inferior turbinates normal;  Oral Cavity/Oropharynx  Lips: normal;  Teeth: normal;  Gums: gingiva normal;  Tongue: normal;  Oral mucosa: normal;  Hard palate: normal;  Neck  Neck: neck normal;  Thyroid: nodules present on bilateral lobes;         Result Review    RESULTS REVIEW    I have reviewed the patients old records in the chart.   The following results/records were reviewed:   US Thyroid (07/12/2022 10:38) right mid lobe nodule increased in size greater than 5mm          Assessment:    1. Nontoxic multinodular goiter            Plan:       Medical and surgical options were discussed including observation with serial ultrasounds and fine needle aspiration. Risks, benefits and alternatives were discussed and questions were answered. After considering the options, the patient decided to proceed with fine needle aspiration.        Orders Placed This Encounter   Procedures   • US Guided Thyroid Biopsy       Return in about 6 weeks (around 8/23/2022) for Follow up with me when Dr. Loyola in clinic.       JANE Thompson  07/12/22  11:19 CDT

## 2022-07-12 NOTE — PROGRESS NOTES
Subjective:     Encounter Date: 07/12/2022      Patient ID: Neela Awad is a 76 y.o. female with hypertension, renal cancer s/p nephrectomy 5/15/2020.     Chief Complaint: test results   History of Present Illness  Patient presents today for test results. Patient saw Dr. Saucedo on 5/11/2022 for shortness of breath. She had an echo done 7/7/2022 that showed LVEF 61-65%, grade I diastolic dysfunction, mild pulmonary hypertension. She also had a lexiscan done 7/7/2022 that was low risk. She reports dyspnea on exertion that had been ongoing for awhile. She reports that she has remained active and keeps grandchildren and keeps going.   She reports that she monitors her blood pressure occasionally she sees it running 130-140/70-80. She denies any heart racing or palpitations. She denies any dizziness or lightheadedness. She denies any chest pain. She reports some indigestion. Follows with Dr Rainey and they have been monitoring a hernia. Follows with Dr Dickens. She reports that her  was a former smoker, she denies any smoking history. Patient follows with Dr Soto as PCP.    The following portions of the patient's history were reviewed and updated as appropriate: allergies, current medications, past family history, past medical history, past social history, past surgical history and problem list.    Allergies   Allergen Reactions   • Allopurinol Other (See Comments) and Unknown - High Severity     Elevates blood pressure.   • Morphine Unknown - High Severity       Current Outpatient Medications:   •  aspirin 81 MG EC tablet, Take 81 mg by mouth Daily., Disp: , Rfl:   •  calcium carbonate (OS-EMILIANA) 600 MG tablet, Take 600 mg by mouth Daily., Disp: , Rfl:   •  ferrous sulfate 325 (65 FE) MG tablet, Take 325 mg by mouth Daily With Breakfast., Disp: , Rfl:   •  Garlic 1000 MG capsule, Take  by mouth Daily., Disp: , Rfl:   •  meclizine (ANTIVERT) 25 MG tablet, Take 25 mg by mouth 3 (Three) Times a Day As Needed for  dizziness., Disp: , Rfl:   •  metoprolol tartrate (LOPRESSOR) 25 MG tablet, , Disp: , Rfl:   •  NIFEdipine XL (PROCARDIA XL) 60 MG 24 hr tablet, Take 90 mg by mouth Daily., Disp: , Rfl:   •  polyethylene glycol (MIRALAX) packet, Take 17 g by mouth Daily As Needed., Disp: , Rfl:   •  quinapril (ACCUPRIL) 20 MG tablet, Take 20 mg by mouth 2 (Two) Times a Day., Disp: , Rfl:   •  simvastatin (ZOCOR) 40 MG tablet, Take 40 mg by mouth Every Night., Disp: , Rfl:   •  vitamin B-12 (CYANOCOBALAMIN) 500 MCG tablet, Take 500 mcg by mouth Every Other Day., Disp: , Rfl:   Past Medical History:   Diagnosis Date   • Anemia    • Brain bleed (HCC)     TOOK SHOTS IN EYES    • Cancer of kidney (HCC)    • Cataract    • Colon polyp    • Constipation    • Elevated cholesterol    • Gout    • Hyperlipidemia    • Hypertension    • Incontinence    • Insomnia    • Nocturia    • SCC (squamous cell carcinoma), scalp/neck     right neck     Social History     Socioeconomic History   • Marital status: Legally    Tobacco Use   • Smoking status: Never Smoker   • Smokeless tobacco: Never Used   Vaping Use   • Vaping Use: Never used   Substance and Sexual Activity   • Alcohol use: No   • Drug use: No   • Sexual activity: Defer       Review of Systems   Constitutional: Negative for malaise/fatigue, weight gain and weight loss.   HENT: Negative for nosebleeds.    Cardiovascular: Positive for dyspnea on exertion. Negative for chest pain, leg swelling, near-syncope, orthopnea, palpitations, paroxysmal nocturnal dyspnea and syncope.   Respiratory: Negative for shortness of breath.    Hematologic/Lymphatic: Does not bruise/bleed easily.   Genitourinary: Negative for hematuria.   Neurological: Negative for dizziness and weakness.   All other systems reviewed and are negative.         Objective:     Vitals reviewed.   Constitutional:       General: Not in acute distress.     Appearance: Normal appearance. Well-developed.   Eyes:      Pupils: Pupils  "are equal, round, and reactive to light.   HENT:      Head: Normocephalic and atraumatic.      Nose: Nose normal.   Neck:      Vascular: No carotid bruit.   Pulmonary:      Effort: Pulmonary effort is normal. No respiratory distress.      Breath sounds: Normal breath sounds. No wheezing. No rales.   Cardiovascular:      Normal rate. Regular rhythm.      Murmurs: There is no murmur.   Edema:     Peripheral edema absent.   Abdominal:      General: There is no distension.      Palpations: Abdomen is soft.   Musculoskeletal: Normal range of motion.      Cervical back: Normal range of motion and neck supple. Skin:     General: Skin is warm.      Findings: No erythema or rash.   Neurological:      General: No focal deficit present.      Mental Status: Alert and oriented to person, place, and time.   Psychiatric:         Attention and Perception: Attention normal.         Mood and Affect: Mood normal.         Speech: Speech normal.         Behavior: Behavior normal.         Thought Content: Thought content normal.         Judgment: Judgment normal.         /80   Pulse 67   Ht 154.9 cm (61\")   Wt 58.5 kg (129 lb)   SpO2 98%   BMI 24.37 kg/m²     Procedures    Lab Review:      [employer] 7/7/2022:   Interpretation Summary     · Breast attenuation and GI artifacts are present.  · Left ventricular ejection fraction is hyperdynamic (Calculated EF > 70%). .  · Myocardial perfusion imaging indicates a normal myocardial perfusion study with no evidence of ischemia.  · Impressions are consistent with a low risk study.     Results for orders placed during the hospital encounter of 07/07/22    Adult Transthoracic Echo Complete w/ Color, Spectral and Contrast if necessary per protocol    Interpretation Summary  · Left ventricular ejection fraction appears to be 61 - 65%.  · Left ventricular diastolic function is consistent with (grade I) impaired relaxation.  · Normal global longitudinal LV strain (GLS) = -21.0%.  · Mild " pulmonary hypertension is present.    Lab Results   Component Value Date    TRIG 77 10/01/2019    HDL 58 10/01/2019     (H) 10/01/2019     I have personally reviewed echo , nuclear stress test, and past office notes  Assessment:          Diagnosis Plan   1. Primary hypertension     2. LUNA (dyspnea on exertion)     3. Mixed hyperlipidemia     4. Carcinoma of right kidney (HCC)            Plan:       1. Hypertension: well controlled. Continue current medications    2. LUNA: Nuclear stress test done 7/7/2022 low risk for ischemia. Echo done 7/7/2022 LVEF 61-65%, grade I diastolic function, mild pulmonary hypertension. Discussed low sodium diet (0536-3790 mg sodium/day), leg elevation and compression. Dr Dickens informed patient that he felt she was stable enough to proceed with CTA coronaries if warranted. Will continue to monitor and consult Dr Dickens in future if felt necessary.     3. Hyperlipidemia: LDL of 121 2019. Managed and followed by PCP. Continue simvastatin    4. Renal cancer of right kidney: s/p nephrectomy 5/15/2020. patient continues to follow up with Dr Rainey and Dr Dickens. She reports everything has looked stable.     Patient is to follow up in 3 months or sooner if needed

## 2022-08-10 ENCOUNTER — HOSPITAL ENCOUNTER (OUTPATIENT)
Dept: ULTRASOUND IMAGING | Facility: HOSPITAL | Age: 77
Discharge: HOME | End: 2022-08-10
Admitting: EMERGENCY MEDICINE

## 2022-08-10 DIAGNOSIS — E04.2 NONTOXIC MULTINODULAR GOITER: ICD-10-CM

## 2022-08-10 PROCEDURE — 88172 CYTP DX EVAL FNA 1ST EA SITE: CPT | Performed by: EMERGENCY MEDICINE

## 2022-08-10 PROCEDURE — 76942 ECHO GUIDE FOR BIOPSY: CPT

## 2022-08-10 PROCEDURE — 88112 CYTOPATH CELL ENHANCE TECH: CPT | Performed by: EMERGENCY MEDICINE

## 2022-08-10 RX ORDER — LIDOCAINE HYDROCHLORIDE 10 MG/ML
5 INJECTION, SOLUTION INFILTRATION; PERINEURAL ONCE
Status: DISPENSED | OUTPATIENT
Start: 2022-08-10

## 2022-08-11 LAB
BEAKER LAB AP INTRAOPERATIVE CONSULTATION: NORMAL
CYTO UR: NORMAL
LAB AP CASE REPORT: NORMAL
LAB AP CLINICAL INFORMATION: NORMAL
Lab: NORMAL
PATH REPORT.FINAL DX SPEC: NORMAL
PATH REPORT.GROSS SPEC: NORMAL

## 2022-08-25 ENCOUNTER — OFFICE VISIT (OUTPATIENT)
Dept: OTOLARYNGOLOGY | Facility: CLINIC | Age: 77
End: 2022-08-25

## 2022-08-25 VITALS — TEMPERATURE: 97.4 F | HEART RATE: 97 BPM

## 2022-08-25 DIAGNOSIS — E04.2 NONTOXIC MULTINODULAR GOITER: Primary | Chronic | ICD-10-CM

## 2022-08-25 PROCEDURE — 99213 OFFICE O/P EST LOW 20 MIN: CPT | Performed by: EMERGENCY MEDICINE

## 2022-08-25 NOTE — PROGRESS NOTES
JANE Thompson   Chief complaint : Follow up thyroid problems     History of Present Illness  Neela Awad is a 76 y.o. female who presents for follow up of thyroid problems. She has had a recent fine needle aspiration with benign results.      History  Past medical and surgical history, family history and social history reviewed and updated when appropriate.  Current medications and allergies reviewed and updated when appropriate.  Allergies:  Allopurinol and Morphine        Vital Signs  Temp:  [97.4 °F (36.3 °C)] 97.4 °F (36.3 °C)  Heart Rate:  [97] 97    Physical Exam  CONSTITUTIONAL: well nourished, well-developed, alert, oriented, in no acute distress   COMMUNICATION AND VOICE: able to communicate normally, normal voice quality  HEAD: normocephalic, no lesions, atraumatic, no tenderness, no masses   FACE: appearance normal, no lesions, no tenderness, no deformities, facial motion symmetric  EYES: ocular motility normal, eyelids normal, orbits normal, no proptosis, conjunctiva normal , pupils equal, round   EARS:  Hearing: hearing to conversational voice intact bilaterally   External Ears: normal bilaterally, no lesions  NOSE:  External Nose: external nasal structure normal, no tenderness on palpation, no nasal discharge, no lesions, no evidence of trauma, nostrils patent   ORAL:  Lips: upper and lower lips without lesion   NECK:  Inspection and Palpation: neck appearance normal, no masses or tenderness  THYROID: non-tender, no mass, no thyromegaly,  CHEST/RESPIRATORY: normal respiratory effort   CARDIOVASCULAR: no cyanosis or edema   NEUROLOGICAL/PSYCHIATRIC: oriented to time, place and person, mood normal, affect appropriate, CN II-XII intact grossly    Results Review:          Lab Results   Component Value Date    FINALDX  08/10/2022     Thyroid, right lobe, fine-needle aspiration:  [place] category 2: Benign follicular nodule.          Assessment   Thyroid nodule, benign    Plan   Medical and  surgical options were discussed including observation with serial ultrasounds vs thyroidectomy. Risks, benefits and alternatives were discussed and questions were answered. After considering the options, the patient decided to proceed with observation with serial ultrasounds.  Patient Instructions   Call or return to clinic if increasing size of nodule or thyroid, trouble swallowing, noisy breathing, racing heartrate, or neck mass.                 Return in about 1 year (around 8/25/2023) for Follow up with Ultrasound, Follow up with JANE Soni.     JANE Thompson  08/25/22  14:10 CDT

## 2022-10-13 ENCOUNTER — OFFICE VISIT (OUTPATIENT)
Dept: CARDIOLOGY | Facility: CLINIC | Age: 77
End: 2022-10-13

## 2022-10-13 VITALS
HEIGHT: 61 IN | DIASTOLIC BLOOD PRESSURE: 78 MMHG | HEART RATE: 57 BPM | WEIGHT: 126 LBS | SYSTOLIC BLOOD PRESSURE: 156 MMHG | BODY MASS INDEX: 23.79 KG/M2

## 2022-10-13 DIAGNOSIS — R06.09 DOE (DYSPNEA ON EXERTION): ICD-10-CM

## 2022-10-13 DIAGNOSIS — E78.2 MIXED HYPERLIPIDEMIA: ICD-10-CM

## 2022-10-13 DIAGNOSIS — C64.1 CARCINOMA OF RIGHT KIDNEY: ICD-10-CM

## 2022-10-13 DIAGNOSIS — I10 PRIMARY HYPERTENSION: Primary | ICD-10-CM

## 2022-10-13 PROCEDURE — 99214 OFFICE O/P EST MOD 30 MIN: CPT | Performed by: NURSE PRACTITIONER

## 2022-10-13 NOTE — PROGRESS NOTES
Subjective:     Encounter Date: 10/13/2022      Patient ID: Neela Awad is a 77 y.o. female with hypertension, renal cancer s/p nephrectomy 5/15/2020.     Chief Complaint: 3 month follow up  Hypertension  Pertinent negatives include no chest pain, malaise/fatigue, orthopnea, palpitations, PND or shortness of breath.      Patient saw Dr. Saucedo on 5/11/2022 for shortness of breath. She had an echo done 7/7/2022 that showed LVEF 61-65%, grade I diastolic dysfunction, mild pulmonary hypertension. She also had a lexiscan done 7/7/2022 that was low risk. She had a colonoscopy in September and lost a lot of blood following. She reports that the pathology came back normal. She states that her Hgb was checked and had remained stable around 11. She reports that she has been having pain in her left flank pain on and off. She states that It will last about a week and be severe and then be gone for awhile. Dr Aponte did a KUB that showed moderate stool amount and hiatal hernia.   Today she reports that she has been doing well despite her abd pain. She reports that her dyspnea on exertion has been about the same. She reports that she has been wanting to get back to walking and getting a little exercise. She denies any difficulty today coming into the office from the parking lot. She denies any chest pain. She denies any leg swelling, orthopnea or PND. She denies any heart racing or palpitations. She denies any dizziness or lightheadedness. She reports BP reading 130-140/70-80.  She is going on a cruise next week to [place]. Follows with Dr Rainey and they have been monitoring a hernia. Follows with Dr Dickens. She reports that her  was a former smoker, she denies any smoking history. Patient was a little flustered this am due to running late and going to the wrong office. Patient follows with Dr Soto/Abdiaziz as PCP.    The following portions of the patient's history were reviewed and updated as appropriate: allergies,  current medications, past family history, past medical history, past social history, past surgical history and problem list.    Allergies   Allergen Reactions   • Allopurinol Other (See Comments) and Unknown - High Severity     Elevates blood pressure.   • Morphine Unknown - High Severity       Current Outpatient Medications:   •  aspirin 81 MG EC tablet, Take 81 mg by mouth Daily., Disp: , Rfl:   •  calcium carbonate (OS-EMILIANA) 600 MG tablet, Take 600 mg by mouth Daily., Disp: , Rfl:   •  ferrous sulfate 325 (65 FE) MG tablet, Take 325 mg by mouth Daily With Breakfast., Disp: , Rfl:   •  Garlic 1000 MG capsule, Take  by mouth Daily., Disp: , Rfl:   •  meclizine (ANTIVERT) 25 MG tablet, Take 25 mg by mouth 3 (Three) Times a Day As Needed for dizziness., Disp: , Rfl:   •  metoprolol tartrate (LOPRESSOR) 25 MG tablet, , Disp: , Rfl:   •  NIFEdipine XL (PROCARDIA XL) 60 MG 24 hr tablet, Take 90 mg by mouth Daily., Disp: , Rfl:   •  polyethylene glycol (MIRALAX) packet, Take 17 g by mouth Daily As Needed., Disp: , Rfl:   •  quinapril (ACCUPRIL) 20 MG tablet, Take 20 mg by mouth 2 (Two) Times a Day., Disp: , Rfl:   •  simvastatin (ZOCOR) 40 MG tablet, Take 40 mg by mouth Every Night., Disp: , Rfl:   •  vitamin B-12 (CYANOCOBALAMIN) 500 MCG tablet, Take 500 mcg by mouth Every Other Day., Disp: , Rfl:     Current Facility-Administered Medications:   •  lidocaine (XYLOCAINE) 1 % injection 5 mL, 5 mL, Infiltration, Once, Ludivina Yu APRN  Past Medical History:   Diagnosis Date   • Anemia    • Brain bleed (HCC)     TOOK SHOTS IN EYES    • Cancer of kidney (HCC)    • Cataract    • Colon polyp    • Constipation    • Elevated cholesterol    • Gout    • Hyperlipidemia    • Hypertension    • Incontinence    • Insomnia    • Nocturia    • SCC (squamous cell carcinoma), scalp/neck     right neck     Social History     Socioeconomic History   • Marital status: Legally    Tobacco Use   • Smoking status: Never   •  Smokeless tobacco: Never   Vaping Use   • Vaping Use: Never used   Substance and Sexual Activity   • Alcohol use: No   • Drug use: No   • Sexual activity: Defer       Review of Systems   Constitutional: Negative for malaise/fatigue, weight gain and weight loss.   HENT: Negative for nosebleeds.    Cardiovascular: Positive for dyspnea on exertion. Negative for chest pain, leg swelling, near-syncope, orthopnea, palpitations, paroxysmal nocturnal dyspnea and syncope.   Respiratory: Negative for shortness of breath.    Hematologic/Lymphatic: Does not bruise/bleed easily.   Genitourinary: Negative for hematuria.   Neurological: Negative for dizziness and weakness.   All other systems reviewed and are negative.         Objective:     Vitals reviewed.   Constitutional:       General: Not in acute distress.     Appearance: Normal appearance. Well-developed.   Eyes:      Pupils: Pupils are equal, round, and reactive to light.   HENT:      Head: Normocephalic and atraumatic.      Nose: Nose normal.   Neck:      Vascular: No carotid bruit.   Pulmonary:      Effort: Pulmonary effort is normal. No respiratory distress.      Breath sounds: Normal breath sounds. No wheezing. No rales.   Cardiovascular:      Normal rate. Regular rhythm.      Murmurs: There is no murmur.   Edema:     Peripheral edema absent.   Abdominal:      General: There is no distension.      Palpations: Abdomen is soft.   Musculoskeletal: Normal range of motion.      Cervical back: Normal range of motion and neck supple. Skin:     General: Skin is warm.      Findings: No erythema or rash.   Neurological:      General: No focal deficit present.      Mental Status: Alert and oriented to person, place, and time.   Psychiatric:         Attention and Perception: Attention normal.         Mood and Affect: Mood normal.         Speech: Speech normal.         Behavior: Behavior normal.         Thought Content: Thought content normal.         Judgment: Judgment normal.  "        /78   Pulse 57   Ht 154.9 cm (61\")   Wt 57.2 kg (126 lb)   BMI 23.81 kg/m²     Procedures    Lab Review:      [employer] 7/7/2022:   Interpretation Summary     · Breast attenuation and GI artifacts are present.  · Left ventricular ejection fraction is hyperdynamic (Calculated EF > 70%). .  · Myocardial perfusion imaging indicates a normal myocardial perfusion study with no evidence of ischemia.  · Impressions are consistent with a low risk study.     Results for orders placed during the hospital encounter of 07/07/22    Adult Transthoracic Echo Complete w/ Color, Spectral and Contrast if necessary per protocol    Interpretation Summary  · Left ventricular ejection fraction appears to be 61 - 65%.  · Left ventricular diastolic function is consistent with (grade I) impaired relaxation.  · Normal global longitudinal LV strain (GLS) = -21.0%.  · Mild pulmonary hypertension is present.    Lab Results   Component Value Date    TRIG 77 10/01/2019    HDL 58 10/01/2019     (H) 10/01/2019     I have personally reviewed echo , nuclear stress test, and past office notes  Assessment:          Diagnosis Plan   1. Primary hypertension        2. LUNA (dyspnea on exertion)        3. Mixed hyperlipidemia        4. Carcinoma of right kidney (HCC)               Plan:       1. Hypertension: BP elevated in office today; recheck improved. Recommend to continue to monitor routinely and notify PCP If BP consistently > 150/90.      2. LUNA: Nuclear stress test done 7/7/2022 low risk for ischemia. Echo done 7/7/2022 LVEF 61-65%, grade I diastolic function, mild pulmonary hypertension. Discussed low sodium diet (2476-8009 mg sodium/day), leg elevation and compression. Dr Dickens informed patient that he felt she was stable enough to proceed with CTA coronaries if warranted. Discussed ordering CTA coronaries to further evaluate dyspnea on exertion. Patient fills that her symptoms have improved and would like to continue to " monitor at this time. She is to notify office if symptoms worsen. Will continue to monitor and consult Dr Dickens in future if felt necessary.     3. Hyperlipidemia: LDL of 121 2019. Managed and followed by PCP. Continue simvastatin    4. Renal cancer of right kidney: s/p nephrectomy 5/15/2020. patient continues to follow up with Dr Rainey and Dr Dickens. She reports everything has looked stable.     I attest that all portions of this note reviewed and all information has been updated by myself to reflect the patient's current status.      Patient is to follow up in 6 months or sooner if needed

## 2022-11-09 LAB
ALBUMIN SERPL-MCNC: 4.1 G/DL (ref 3.5–5.2)
ALP BLD-CCNC: 66 U/L (ref 35–104)
ALT SERPL-CCNC: 12 U/L (ref 5–33)
ANION GAP SERPL CALCULATED.3IONS-SCNC: 11 MMOL/L (ref 7–19)
AST SERPL-CCNC: 18 U/L (ref 5–32)
BACTERIA: NEGATIVE /HPF
BASOPHILS ABSOLUTE: 0 K/UL (ref 0–0.2)
BASOPHILS RELATIVE PERCENT: 0.5 % (ref 0–1)
BILIRUB SERPL-MCNC: 0.4 MG/DL (ref 0.2–1.2)
BILIRUBIN URINE: NEGATIVE
BLOOD, URINE: NEGATIVE
BUN BLDV-MCNC: 27 MG/DL (ref 8–23)
CALCIUM SERPL-MCNC: 9.6 MG/DL (ref 8.8–10.2)
CHLORIDE BLD-SCNC: 103 MMOL/L (ref 98–111)
CLARITY: CLEAR
CO2: 25 MMOL/L (ref 22–29)
COLOR: YELLOW
CREAT SERPL-MCNC: 1.7 MG/DL (ref 0.5–0.9)
CREATININE URINE: 218.7 MG/DL (ref 4.2–622)
CRYSTALS, UA: ABNORMAL /HPF
EOSINOPHILS ABSOLUTE: 0.4 K/UL (ref 0–0.6)
EOSINOPHILS RELATIVE PERCENT: 4.5 % (ref 0–5)
EPITHELIAL CELLS, UA: 6 /HPF (ref 0–5)
GFR SERPL CREATININE-BSD FRML MDRD: 30 ML/MIN/{1.73_M2}
GLUCOSE BLD-MCNC: 80 MG/DL (ref 74–109)
GLUCOSE URINE: NEGATIVE MG/DL
HCT VFR BLD CALC: 41.7 % (ref 37–47)
HEMOGLOBIN: 13.3 G/DL (ref 12–16)
HYALINE CASTS: 2 /HPF (ref 0–8)
IMMATURE GRANULOCYTES #: 0 K/UL
KETONES, URINE: NEGATIVE MG/DL
LEUKOCYTE ESTERASE, URINE: ABNORMAL
LYMPHOCYTES ABSOLUTE: 1.2 K/UL (ref 1.1–4.5)
LYMPHOCYTES RELATIVE PERCENT: 14.8 % (ref 20–40)
MCH RBC QN AUTO: 30.4 PG (ref 27–31)
MCHC RBC AUTO-ENTMCNC: 31.9 G/DL (ref 33–37)
MCV RBC AUTO: 95.4 FL (ref 81–99)
MONOCYTES ABSOLUTE: 0.7 K/UL (ref 0–0.9)
MONOCYTES RELATIVE PERCENT: 9.5 % (ref 0–10)
NEUTROPHILS ABSOLUTE: 5.5 K/UL (ref 1.5–7.5)
NEUTROPHILS RELATIVE PERCENT: 70.3 % (ref 50–65)
NITRITE, URINE: NEGATIVE
PDW BLD-RTO: 13.2 % (ref 11.5–14.5)
PH UA: 5.5 (ref 5–8)
PLATELET # BLD: 199 K/UL (ref 130–400)
PMV BLD AUTO: 10.9 FL (ref 9.4–12.3)
POTASSIUM SERPL-SCNC: 4.4 MMOL/L (ref 3.5–5)
PROTEIN PROTEIN: 85 MG/DL (ref 15–45)
PROTEIN UA: 100 MG/DL
RBC # BLD: 4.37 M/UL (ref 4.2–5.4)
RBC UA: 1 /HPF (ref 0–4)
SODIUM BLD-SCNC: 139 MMOL/L (ref 136–145)
SPECIFIC GRAVITY UA: 1.02 (ref 1–1.03)
TOTAL PROTEIN: 7.3 G/DL (ref 6.6–8.7)
UROBILINOGEN, URINE: 0.2 E.U./DL
VITAMIN D 25-HYDROXY: 44.4 NG/ML
WBC # BLD: 7.8 K/UL (ref 4.8–10.8)
WBC UA: 4 /HPF (ref 0–5)

## 2022-12-12 ENCOUNTER — TELEPHONE (OUTPATIENT)
Dept: UROLOGY | Facility: CLINIC | Age: 77
End: 2022-12-12

## 2022-12-12 NOTE — TELEPHONE ENCOUNTER
Caller: NIKKIE HUFFMAN        Best call back number: [phone]    What orders are you requesting (i.e. lab or imaging): IMAGING    In what timeframe would the patient need to come in: 1/26/23    Where will you receive your lab/imaging services:     Additional notes: APPT NOTES STATE DR DAVIS WANTS PT TO HAVE CT PRIOR TO VISIT. VISIT IS SCHEDULED 1/26/23 AT 2:30    PLEASE CALL PT WHEN ORDER IS IN CHART SO SHE CAN SCHEDULING CT THRU CENTRALIZED SCHEDULING.

## 2022-12-15 DIAGNOSIS — C64.1 CLEAR CELL RENAL CELL CARCINOMA, RIGHT: Primary | ICD-10-CM

## 2023-01-25 NOTE — PROGRESS NOTES
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  She underwent her nephrectomy in 05/2020.   Hx:  05/13/2020: RIGHT NEPHRECTOMY LAPAROSCOPIC WITH DAVINCI ROBOT Op Note by Quinton Rainey MD (05/13/2020 11:03) for pT1b, Grade 2, Clear cell renal cell carcinoma      Current Outpatient Medications:   •  aspirin 81 MG EC tablet, Take 81 mg by mouth Daily., Disp: , Rfl:   •  calcium carbonate (OS-EMILIANA) 600 MG tablet, Take 600 mg by mouth Daily., Disp: , Rfl:   •  ferrous sulfate 325 (65 FE) MG tablet, Take 325 mg by mouth Daily With Breakfast., Disp: , Rfl:   •  Garlic 1000 MG capsule, Take  by mouth Daily., Disp: , Rfl:   •  meclizine (ANTIVERT) 25 MG tablet, Take 25 mg by mouth 3 (Three) Times a Day As Needed for dizziness., Disp: , Rfl:   •  metoprolol tartrate (LOPRESSOR) 25 MG tablet, , Disp: , Rfl:   •  NIFEdipine XL (PROCARDIA XL) 60 MG 24 hr tablet, Take 90 mg by mouth Daily., Disp: , Rfl:   •  polyethylene glycol (MIRALAX) packet, Take 17 g by mouth Daily As Needed., Disp: , Rfl:   •  quinapril (ACCUPRIL) 20 MG tablet, Take 20 mg by mouth 2 (Two) Times a Day., Disp: , Rfl:   •  simvastatin (ZOCOR) 40 MG tablet, Take 40 mg by mouth Every Night., Disp: , Rfl:   •  vitamin B-12 (CYANOCOBALAMIN) 500 MCG tablet, Take 500 mcg by mouth Every Other Day., Disp: , Rfl:     Current Facility-Administered Medications:   •  lidocaine (XYLOCAINE) 1 % injection 5 mL, 5 mL, Infiltration, Once, Ludivina Yu APRN  Past Medical History:   Diagnosis Date   • Anemia    • Brain bleed (HCC)     TOOK SHOTS IN EYES    • Cancer of kidney (HCC)    • Cataract    • Colon polyp    • Constipation    • Elevated cholesterol    • Gout    • Hyperlipidemia    • Hypertension    • Incontinence    • Insomnia    • Nocturia    • SCC (squamous cell carcinoma), scalp/neck     right neck  "    Past Surgical History:   Procedure Laterality Date   • COLONOSCOPY     • HYSTERECTOMY     • JOINT REPLACEMENT Right    • KNEE SURGERY Right    • NEPHRECTOMY N/A 05/13/2020    Procedure: NEPHRECTOMY LAPAROSCOPIC WITH DAVINCI ROBOT;  Surgeon: Quinton Rainey MD;  Location: [facility];  Service: [facility];  Laterality: N/A;   • SKIN CANCER EXCISION Left     left hand 10/2022           Review of Systems      Objective   PHYSICAL EXAM  Vital Signs:   Ht 154.9 cm (61\")   Wt 58.9 kg (129 lb 12.8 oz)   BMI 24.53 kg/m²     Physical Exam  Constitutional: Patient is without distress or deformity.  Vital signs are reviewed as above.    Neuro: No confusion; No disorientation; Alert and oriented  Pulmonary: No respiratory distress.   Skin: No pallor or diaphoresis          DATA  Result Review :              Results for orders placed or performed during the hospital encounter of 08/10/22   Fine Needle Aspiration    Specimen: Thyroid; Body Fluid   Result Value Ref Range    Note to Patients       This report may contain a detailed description of human tissue sent by a health care provider to the laboratory for pathologic evaluation. The content of this report is essential for diagnosis and may provide important critical findings. This information may be unfamiliar to patients to review without a medical professional present. It is advised that the patient review this report in the presence of a health care provider who can answer questions and explain the results.      Case Report       Medical Cytology Report                           Case: [id number]                                 Authorizing Provider:  Ludivina Yu APRN    Collected:           08/10/2022 11:20 AM          Ordering Location:     [facility]  Received:            08/10/2022 01:16 PM          Pathologist:           Jadiel Bergman MD                                                        Specimen:    Thyroid, right                       " "                                                      Final Diagnosis       Thyroid, right lobe, fine-needle aspiration:  [place] category 2: Benign follicular nodule.      Clinical Information       Hx: 1.5 X 0.7 X 1.3 cm right thyroid nodule, multiple thyroid nodules      Gross Description       1. Thyroid.  Received in cytolyt labeled with patient name and  designated as right thyroid FNA.  30 mls with blood tinged aspirate.  2 smears fixed in 95% alcohol, 2 smears air dried, and 1 thin prep slide prepared.      Intraoperative Consultation       1. Thyroid.  Fine Needle Aspiration, Right Thyroid:         Pass 1: Adequate        Pass 2: Not reviewed       Pass 3: Cytolyt       Pass 4: Cytolyt       Pass 5: Cytolyt         ASHLEIGH De Leon(ASCP)HTL                 08/10/2022      Microscopic Description       Cytologic evaluation shows a low cellularity aspirate consisting of scattered macrofollicular groups of bland appearing follicular cells in a background of watery colloid and scattered macrophages.  Benign oncocytic changes are present.  No cytologic features of malignancy are seen.       CT abdomen pelvis wo contrast (2023 11:10)    The images for the above \"link(s)\" were made available to me to review independently.  I also reviewed the radiologist's report described above with regard to the urologic findings. My interpretation is as follows:  Patient has no evidence of tumor recurrence in the nephrectomy bed.  There is no retroperitoneal adenopathy present.  Contralateral kidney is without renal mass.  /Quinton Rainey MD          Lab Results   Component Value Date    WBC 7.8 2022    HGB 13.3 2022    HCT 41.7 2022    MCV 95.4 2022     2022       Lab Results   Component Value Date    GLUCOSE 90 2021    BUN 27 (H) 10/04/2022    CREATININE 1.58 (H) 10/04/2022    EGFRIFNONA 31.7 (L) 10/04/2022    EGFRIFAFRI 38.4 (L) 10/04/2022    BCR 17.1 10/04/2022    K 4.7 " 10/04/2022    CO2 25 10/04/2022    CALCIUM 9.2 10/04/2022    ALBUMIN 3.9 10/04/2022    AST 17 10/04/2022    ALT 20 10/04/2022                  ASSESSMENT AND PLAN          Problem List Items Addressed This Visit    None  Visit Diagnoses     Clear cell renal cell carcinoma, right (HCC)    -  Primary    Relevant Orders    XR Chest 2 View    XR Chest 2 View      For low risk renal cell carcinoma(she is pT1b&, AUA guidelines dictate cross-sectional imaging at and 24 months, then alternating renal ultrasound with cross-sectional imaging annually until 5 years.  She will need chest x-ray annually until 5 years.  After this informed decision making will be used on imaging.               FOLLOW UP     Return in about 1 year (around 1/26/2024) for ct renal mass protocol before next visit and chest xray before next visit. .        (Please note that portions of this note were completed with a voice recognition program.)  Quinton Rainey MD  01/26/23  14:55 CST

## 2023-01-26 ENCOUNTER — HOSPITAL ENCOUNTER (OUTPATIENT)
Dept: CT IMAGING | Facility: HOSPITAL | Age: 78
Discharge: HOME | End: 2023-01-26
Payer: MEDICARE

## 2023-01-26 ENCOUNTER — HOSPITAL ENCOUNTER (OUTPATIENT)
Dept: GENERAL RADIOLOGY | Facility: HOSPITAL | Age: 78
Discharge: HOME | End: 2023-01-26
Payer: MEDICARE

## 2023-01-26 ENCOUNTER — OFFICE VISIT (OUTPATIENT)
Dept: UROLOGY | Facility: CLINIC | Age: 78
End: 2023-01-26
Payer: MEDICARE

## 2023-01-26 VITALS — HEIGHT: 61 IN | BODY MASS INDEX: 24.51 KG/M2 | WEIGHT: 129.8 LBS

## 2023-01-26 DIAGNOSIS — C64.1 CLEAR CELL RENAL CELL CARCINOMA, RIGHT: ICD-10-CM

## 2023-01-26 DIAGNOSIS — C64.1 CLEAR CELL RENAL CELL CARCINOMA, RIGHT: Primary | ICD-10-CM

## 2023-01-26 PROCEDURE — 99213 OFFICE O/P EST LOW 20 MIN: CPT | Performed by: UROLOGY

## 2023-01-26 PROCEDURE — 71046 X-RAY EXAM CHEST 2 VIEWS: CPT

## 2023-01-26 PROCEDURE — 74176 CT ABD & PELVIS W/O CONTRAST: CPT

## 2023-02-02 NOTE — TELEPHONE ENCOUNTER
I contacted her. She hasn't experienced one of these episodes over the last several years.  We did discuss a couple other important issues regarding the scheduling of the procedure.  At present time we have her scheduled for Wednesday, 4/22/2020.  I explained to her that the situation with the coronavirus is very fluid.  If we are experiencing a significant increase in the number of cases it would probably be safer to postpone her procedure because the threat of the coronavirus would probably be more of an immediate threat to her life than the renal mass suspected to be cancer.    Quinton Rainey MD  4/3/2020  08:43       None

## 2023-02-15 DIAGNOSIS — K44.9 HIATAL HERNIA WITHOUT GANGRENE AND OBSTRUCTION: Primary | ICD-10-CM

## 2023-02-20 ENCOUNTER — TELEPHONE (OUTPATIENT)
Dept: UROLOGY | Facility: CLINIC | Age: 78
End: 2023-02-20

## 2023-02-20 NOTE — TELEPHONE ENCOUNTER
Caller: NIKKIE HUFFMAN    Relationship: SELF    Best call back number: [phone]    What form or medical record are you requesting: IMAGING AND OFFICE NOTE WHERE HERNIA WAS FOUND, OPERATIVE REPORT FROM KIDNEY REMOVAL SURGERY    Who is requesting this form or medical record from you: DR LARA BARROS    How would you like to receive the form or medical records (pick-up, mail, fax): FAX  If fax, what is the fax number: [phone]    Timeframe paperwork needed: HAS APPT 3/2/23 @ [place]    Additional notes:

## 2023-02-21 ENCOUNTER — TELEPHONE (OUTPATIENT)
Dept: UROLOGY | Facility: CLINIC | Age: 78
End: 2023-02-21
Payer: MEDICARE

## 2023-02-28 ENCOUNTER — TELEPHONE (OUTPATIENT)
Dept: UROLOGY | Facility: CLINIC | Age: 78
End: 2023-02-28
Payer: MEDICARE

## 2023-02-28 NOTE — TELEPHONE ENCOUNTER
Pt called asking if her sister could come  a disc for her referral to [facility]. Advised that she would need to talk to radiology regarding any discs. Transferred pt to radiology main desk to help patient

## 2023-08-28 ENCOUNTER — OFFICE VISIT (OUTPATIENT)
Dept: OTOLARYNGOLOGY | Facility: CLINIC | Age: 78
End: 2023-08-28
Payer: MEDICARE

## 2023-08-28 VITALS
BODY MASS INDEX: 23.79 KG/M2 | HEART RATE: 75 BPM | HEIGHT: 61 IN | SYSTOLIC BLOOD PRESSURE: 151 MMHG | TEMPERATURE: 97.6 F | WEIGHT: 126 LBS | DIASTOLIC BLOOD PRESSURE: 75 MMHG

## 2023-08-28 DIAGNOSIS — E04.2 NONTOXIC MULTINODULAR GOITER: Primary | ICD-10-CM

## 2023-08-28 RX ORDER — LISINOPRIL 10 MG/1
TABLET ORAL
COMMUNITY
Start: 2023-07-14

## 2024-01-12 NOTE — PROGRESS NOTES
Chief Complaint  Renal Cell Clear Cell Carcinoma     Subjective          Neela Awad presents to [facility] to follow-up renal cell carcinoma.  She has had no hematuria or flank pain since.  She is now 3-1/2 years status post right robot-assisted laparoscopic radical nephrectomy for clear-cell carcinoma.  The patient denies any hematuria, flank pain patient denies gross hematuria, flank pain, abdominal pain, night sweats, unexplained weight loss, unusual musculoskeletal pain, hemoptysis, or unexplained shortness of breath.      Hx:  05/13/2020: RIGHT NEPHRECTOMY LAPAROSCOPIC WITH DAVINCI ROBOT Op Note by Quinton Rainey MD (05/13/2020 11:03) for pT1b, Grade 2, Clear cell renal cell carcinoma            Current Outpatient Medications:     aspirin 81 MG EC tablet, Take 1 tablet by mouth Daily., Disp: , Rfl:     calcium carbonate (OS-EMILIANA) 600 MG tablet, Take 1 tablet by mouth Daily., Disp: , Rfl:     Farxiga 10 MG tablet, Take 10 mg by mouth Daily., Disp: , Rfl:     ferrous sulfate 325 (65 FE) MG tablet, Take 1 tablet by mouth Daily With Breakfast., Disp: , Rfl:     Garlic 1000 MG capsule, Take  by mouth Daily., Disp: , Rfl:     lisinopril (PRINIVIL,ZESTRIL) 10 MG tablet, , Disp: , Rfl:     meclizine (ANTIVERT) 25 MG tablet, Take 1 tablet by mouth 3 (Three) Times a Day As Needed for Dizziness., Disp: , Rfl:     metoprolol succinate XL (TOPROL-XL) 25 MG 24 hr tablet, Every 12 (Twelve) Hours., Disp: , Rfl:     NIFEdipine XL (PROCARDIA XL) 60 MG 24 hr tablet, Take 90 mg by mouth Daily., Disp: , Rfl:     simvastatin (ZOCOR) 40 MG tablet, Take 1 tablet by mouth Every Night., Disp: , Rfl:     vitamin B-12 (CYANOCOBALAMIN) 500 MCG tablet, Take 1 tablet by mouth Every Other Day., Disp: , Rfl:     metoprolol tartrate (LOPRESSOR) 25 MG tablet, , Disp: , Rfl:     Current Facility-Administered Medications:     lidocaine (XYLOCAINE) 1 % injection 5 mL, 5 mL, Infiltration, Once, Ludivina Yu  "APRN  Past Medical History:   Diagnosis Date    Anemia     Brain bleed     TOOK SHOTS IN EYES     Cancer of kidney     Cataract     Colon polyp     Constipation     Elevated cholesterol     Gout     Hiatal hernia     Hyperlipidemia     Hypertension     Incontinence     Insomnia     Nocturia     SCC (squamous cell carcinoma), scalp/neck     right neck     Past Surgical History:   Procedure Laterality Date    COLONOSCOPY      HIATAL HERNIA REPAIR  2023    HYSTERECTOMY      JOINT REPLACEMENT Right     KNEE SURGERY Right     NEPHRECTOMY N/A 05/13/2020    Procedure: NEPHRECTOMY LAPAROSCOPIC WITH DAVINCI ROBOT;  Surgeon: Quinton Rainey MD;  Location: [facility];  Service: [facility];  Laterality: N/A;    SKIN CANCER EXCISION Left     left hand 10/2022           Review of Systems      Objective   PHYSICAL EXAM  Vital Signs:   Temp 96.2 °F (35.7 °C)   Ht 154.9 cm (61\")   Wt 61.2 kg (135 lb)   BMI 25.51 kg/m²     Physical Exam      DATA  Result Review :              Results for orders placed or performed during the hospital encounter of 08/10/22   Fine Needle Aspiration    Specimen: Thyroid; Body Fluid   Result Value Ref Range    Note to Patients       This report may contain a detailed description of human tissue sent by a health care provider to the laboratory for pathologic evaluation. The content of this report is essential for diagnosis and may provide important critical findings. This information may be unfamiliar to patients to review without a medical professional present. It is advised that the patient review this report in the presence of a health care provider who can answer questions and explain the results.      Case Report       Medical Cytology Report                           Case: [id number]                                 Authorizing Provider:  Ludivina Yu APRN    Collected:           08/10/2022 11:20 AM          Ordering Location:     [facility]  Received:            08/10/2022 " "01:16 PM          Pathologist:           Jadiel Bergman MD                                                        Specimen:    Thyroid, right                                                                             Final Diagnosis       Thyroid, right lobe, fine-needle aspiration:  [place] category 2: Benign follicular nodule.      Clinical Information       Hx: 1.5 X 0.7 X 1.3 cm right thyroid nodule, multiple thyroid nodules      Gross Description       1. Thyroid.  Received in cytolyt labeled with patient name and  designated as right thyroid FNA.  30 mls with blood tinged aspirate.  2 smears fixed in 95% alcohol, 2 smears air dried, and 1 thin prep slide prepared.      Intraoperative Consultation       1. Thyroid.  Fine Needle Aspiration, Right Thyroid:         Pass 1: Adequate        Pass 2: Not reviewed       Pass 3: Cytolyt       Pass 4: Cytolyt       Pass 5: Cytolyt         ASHLEIGH De Leon([facility]                 08/10/2022      Microscopic Description       Cytologic evaluation shows a low cellularity aspirate consisting of scattered macrofollicular groups of bland appearing follicular cells in a background of watery colloid and scattered macrophages.  Benign oncocytic changes are present.  No cytologic features of malignancy are seen.       XR Chest 2 View (2024 12:39)  CT abdomen pelvis wo contrast (2024 12:39)   The images for the above \"link(s)\" were made available to me to review independently.  I also reviewed the radiologist's report described above with regard to the urologic findings. My interpretation is as follows:  I looked at her films.  I do not see any evidence of a lesion in the chest.  There is also no evidence of mass in the renal fossa.  No retroperitoneal adenopathy appreciated.  /Quinton Rainey MD               ASSESSMENT AND PLAN          Problem List Items Addressed This Visit    None  Visit Diagnoses       Clear cell renal cell carcinoma, right    -  Primary "    Relevant Orders    XR Chest 2 View (Completed)    CT abdomen pelvis wo contrast (Completed)    POC Urinalysis Dipstick, Multipro    US Renal Bilateral    XR Chest 2 View          Has no evidence of recurrent disease.  Based on pathology considered to have low risk disease.  Therefore we will now begin cross-sectional imaging annually between ultrasound and CT.  She will also need annual chest x-ray until she reaches 5 years.        FOLLOW UP     Return in about 1 year (around 1/30/2025) for Renal US and chest x-ray before visit.        (Please note that portions of this note were completed with a voice recognition program.)  Quinton Rainey MD  01/31/24  10:46 CST

## 2024-01-29 ENCOUNTER — TELEPHONE (OUTPATIENT)
Dept: UROLOGY | Facility: CLINIC | Age: 79
End: 2024-01-29
Payer: MEDICARE

## 2024-01-29 NOTE — TELEPHONE ENCOUNTER
Called Patient to remind them to get imaging prior to appointment.  Spoke with Patient.  If patient calls back it is ok for the HUB to tell the pt the message.

## 2024-01-30 ENCOUNTER — HOSPITAL ENCOUNTER (OUTPATIENT)
Dept: GENERAL RADIOLOGY | Facility: HOSPITAL | Age: 79
Discharge: HOME | End: 2024-01-30
Payer: MEDICARE

## 2024-01-30 ENCOUNTER — HOSPITAL ENCOUNTER (OUTPATIENT)
Dept: CT IMAGING | Facility: HOSPITAL | Age: 79
Discharge: HOME | End: 2024-01-30
Payer: MEDICARE

## 2024-01-30 ENCOUNTER — OFFICE VISIT (OUTPATIENT)
Dept: UROLOGY | Facility: CLINIC | Age: 79
End: 2024-01-30
Payer: MEDICARE

## 2024-01-30 VITALS — TEMPERATURE: 96.2 F | WEIGHT: 135 LBS | BODY MASS INDEX: 25.49 KG/M2 | HEIGHT: 61 IN

## 2024-01-30 DIAGNOSIS — C64.1 CLEAR CELL RENAL CELL CARCINOMA, RIGHT: ICD-10-CM

## 2024-01-30 DIAGNOSIS — C64.1 CLEAR CELL RENAL CELL CARCINOMA, RIGHT: Primary | ICD-10-CM

## 2024-01-30 PROCEDURE — 1159F MED LIST DOCD IN RCRD: CPT | Performed by: UROLOGY

## 2024-01-30 PROCEDURE — 74176 CT ABD & PELVIS W/O CONTRAST: CPT

## 2024-01-30 PROCEDURE — 99213 OFFICE O/P EST LOW 20 MIN: CPT | Performed by: UROLOGY

## 2024-01-30 PROCEDURE — 71046 X-RAY EXAM CHEST 2 VIEWS: CPT

## 2024-01-30 PROCEDURE — 1160F RVW MEDS BY RX/DR IN RCRD: CPT | Performed by: UROLOGY

## 2024-01-30 RX ORDER — DAPAGLIFLOZIN 10 MG/1
1 TABLET, FILM COATED ORAL DAILY
COMMUNITY
Start: 2024-01-19

## 2024-01-30 RX ORDER — METOPROLOL SUCCINATE 25 MG/1
TABLET, EXTENDED RELEASE ORAL EVERY 12 HOURS SCHEDULED
COMMUNITY

## 2024-08-27 ENCOUNTER — OFFICE VISIT (OUTPATIENT)
Dept: OTOLARYNGOLOGY | Facility: CLINIC | Age: 79
End: 2024-08-27
Payer: MEDICARE

## 2024-08-27 VITALS — HEIGHT: 61 IN | WEIGHT: 134 LBS | BODY MASS INDEX: 25.3 KG/M2 | TEMPERATURE: 98.2 F

## 2024-08-27 DIAGNOSIS — E04.2 NONTOXIC MULTINODULAR GOITER: Primary | Chronic | ICD-10-CM

## 2024-08-27 RX ORDER — CLONIDINE HYDROCHLORIDE 0.1 MG/1
0.1 TABLET ORAL 3 TIMES DAILY PRN
COMMUNITY

## 2024-08-27 NOTE — PROGRESS NOTES
[facility]  [address], [address]  [place] [address]  Fax [phone]  Phone [phone]      Visit Type: FOLLOW UP   Chief Complaint   Patient presents with    Thyroid Problem           HPI  She presents for a follow up evaluation. She has had no complaints related to the findings. The patient has not had complaints of : neither throat pain, feeling of something in the throat, voice change or trouble swallowing nor dysphagia, neck tightness, a visable thyroid enlargement or a visable thyroid nodule nor anxiety, palpitations, brittle nails, or weight loss nor weight gain, fluid retention or trouble concentrating.     Past Medical History:   Diagnosis Date    Anemia     Brain bleed     TOOK SHOTS IN EYES     Cancer of kidney     Cataract     Colon polyp     Constipation     Elevated cholesterol     Gout     Hiatal hernia     Hyperlipidemia     Hypertension     Incontinence     Insomnia     Nocturia     SCC (squamous cell carcinoma), scalp/neck     right neck       Past Surgical History:   Procedure Laterality Date    COLONOSCOPY      HIATAL HERNIA REPAIR  2023    HYSTERECTOMY      JOINT REPLACEMENT Right     KNEE SURGERY Right     NEPHRECTOMY N/A 05/13/2020    Procedure: NEPHRECTOMY LAPAROSCOPIC WITH DAVINCI ROBOT;  Surgeon: Quinton Rainey MD;  Location: [facility];  Service: [facility];  Laterality: N/A;    SKIN CANCER EXCISION Left     left hand 10/2022       Family History: Her family history includes Diabetes in an other family member; Heart disease in an other family member; Hypertension in an other family member; Kidney disease in an other family member; Obesity in an other family member.     Social History: She  reports that she has never smoked. She has never used smokeless tobacco. She reports that she does not drink alcohol and does not use drugs.    Home Medications:  Ascorbic Acid, Garlic,  NIFEdipine XL, aspirin, calcium carbonate, cloNIDine, dapagliflozin Propanediol, ferrous sulfate, lisinopril, meclizine, metoprolol tartrate, simvastatin, and vitamin B-12    Allergies:  She is allergic to allopurinol and morphine.       Vital Signs:   Temp:  [98.2 °F (36.8 °C)] 98.2 °F (36.8 °C)  ENT Physical Exam  Neck  Neck: neck normal; neck palpation normal;  Thyroid: nodules and enlargement present on bilateral lobes;           Result Review       RESULTS REVIEW    I have reviewed the patients old records in the chart.   The following results/records were reviewed:   Ancillary Procedure (08/27/2024) stable nodules       Assessment & Plan  Nontoxic multinodular goiter       Orders Placed This Encounter   Procedures    US Thyroid         Continue to follow the thyroid with routine ultrasounds.  Call or return to clinic if increasing size of nodule or thyroid, trouble swallowing, noisy breathing, racing heartrate, or neck mass.  Return in about 1 year (around 8/27/2025) for Follow up with JANE Farley, with ultrasound.        Electronically signed by JANE Farley 08/27/24 2:14 PM CDT.

## 2025-02-17 ENCOUNTER — HOSPITAL ENCOUNTER (OUTPATIENT)
Dept: GENERAL RADIOLOGY | Facility: HOSPITAL | Age: 80
Discharge: HOME | End: 2025-02-17
Payer: MEDICARE

## 2025-02-17 ENCOUNTER — OFFICE VISIT (OUTPATIENT)
Dept: UROLOGY | Facility: CLINIC | Age: 80
End: 2025-02-17
Payer: MEDICARE

## 2025-02-17 ENCOUNTER — HOSPITAL ENCOUNTER (OUTPATIENT)
Dept: ULTRASOUND IMAGING | Facility: HOSPITAL | Age: 80
Discharge: HOME | End: 2025-02-17
Payer: MEDICARE

## 2025-02-17 DIAGNOSIS — C64.1 CLEAR CELL RENAL CELL CARCINOMA, RIGHT: ICD-10-CM

## 2025-02-17 DIAGNOSIS — C64.9 CLEAR CELL PAPILLARY RENAL CELL CARCINOMA: Primary | ICD-10-CM

## 2025-02-17 LAB
BILIRUB BLD-MCNC: NEGATIVE MG/DL
CLARITY, POC: CLEAR
COLOR UR: YELLOW
GLUCOSE UR STRIP-MCNC: ABNORMAL MG/DL
KETONES UR QL: NEGATIVE
LEUKOCYTE EST, POC: NEGATIVE
NITRITE UR-MCNC: NEGATIVE MG/ML
PH UR: 6 [PH] (ref 5–8)
PROT UR STRIP-MCNC: ABNORMAL MG/DL
RBC # UR STRIP: ABNORMAL /UL
SP GR UR: 1.02 (ref 1–1.03)
UROBILINOGEN UR QL: ABNORMAL

## 2025-02-17 PROCEDURE — 1160F RVW MEDS BY RX/DR IN RCRD: CPT

## 2025-02-17 PROCEDURE — 76775 US EXAM ABDO BACK WALL LIM: CPT

## 2025-02-17 PROCEDURE — 71046 X-RAY EXAM CHEST 2 VIEWS: CPT

## 2025-02-17 PROCEDURE — 1159F MED LIST DOCD IN RCRD: CPT

## 2025-02-17 PROCEDURE — 81003 URINALYSIS AUTO W/O SCOPE: CPT

## 2025-02-17 PROCEDURE — 99214 OFFICE O/P EST MOD 30 MIN: CPT

## 2025-02-17 RX ORDER — NIFEDIPINE 90 MG/1
90 TABLET, EXTENDED RELEASE ORAL DAILY
COMMUNITY

## 2025-02-17 RX ORDER — PROBENECID AND COLCHICINE .5; 5 MG/1; MG/1
TABLET ORAL
COMMUNITY
Start: 2024-11-06

## 2025-02-17 RX ORDER — LISINOPRIL 10 MG/1
10 TABLET ORAL DAILY
COMMUNITY

## 2025-02-17 RX ORDER — ERGOCALCIFEROL 1.25 MG/1
1 CAPSULE, LIQUID FILLED ORAL WEEKLY
COMMUNITY
Start: 2025-02-11

## 2025-02-17 NOTE — PROGRESS NOTES
Subjective    Ms. Awad is 79 y.o. female    Chief Complaint: Renal Cell Clear Cell Carcinoma     History of Present Illness    79-year-old female established patient in for annual follow-up regarding history of renal cell carcinoma.  Underwent right nephrectomy with da Veronica robot 5/13/2020 Dr. Rainey for pT1b, grade 2, clear-cell renal cell carcinoma.  Denies any gross hematuria or flank pain.  Denies night sweats or unexplained weight loss.    The following portions of the patient's history were reviewed and updated as appropriate: allergies, current medications, past family history, past medical history, past social history, past surgical history and problem list.    Review of Systems   Constitutional:  Negative for chills, fatigue and fever.   Gastrointestinal:  Negative for nausea and vomiting.   Genitourinary:  Negative for flank pain, frequency, pelvic pain and urgency.         Current Outpatient Medications:     Ascorbic Acid (VITAMIN C ER PO), Take  by mouth., Disp: , Rfl:     aspirin 81 MG EC tablet, Take 1 tablet by mouth Daily., Disp: , Rfl:     calcium carbonate (OS-EMILIANA) 600 MG tablet, Take 1 tablet by mouth Daily., Disp: , Rfl:     cloNIDine (CATAPRES) 0.1 MG tablet, Take 1 tablet by mouth 3 (Three) Times a Day As Needed for High Blood Pressure., Disp: , Rfl:     Farxiga 10 MG tablet, Take 10 mg by mouth Daily., Disp: , Rfl:     ferrous sulfate 325 (65 FE) MG tablet, Take 1 tablet by mouth Daily With Breakfast., Disp: , Rfl:     Garlic 1000 MG capsule, Take  by mouth Daily., Disp: , Rfl:     lisinopril (PRINIVIL,ZESTRIL) 10 MG tablet, , Disp: , Rfl:     meclizine (ANTIVERT) 25 MG tablet, Take 1 tablet by mouth 3 (Three) Times a Day As Needed for Dizziness. (Patient not taking: Reported on 8/27/2024), Disp: , Rfl:     metoprolol tartrate (LOPRESSOR) 25 MG tablet, , Disp: , Rfl:     NIFEdipine XL (PROCARDIA XL) 60 MG 24 hr tablet, Take 90 mg by mouth Daily., Disp: , Rfl:     simvastatin (ZOCOR) 40 MG  tablet, Take 1 tablet by mouth Every Night., Disp: , Rfl:     vitamin B-12 (CYANOCOBALAMIN) 500 MCG tablet, Take 1 tablet by mouth Every Other Day., Disp: , Rfl:     Current Facility-Administered Medications:     lidocaine (XYLOCAINE) 1 % injection 5 mL, 5 mL, Infiltration, Once, Ludivina Gardner APRN    Past Medical History:   Diagnosis Date    Anemia     Brain bleed     TOOK SHOTS IN EYES     Cancer of kidney     Cataract     Colon polyp     Constipation     Elevated cholesterol     Gout     Hiatal hernia     Hyperlipidemia     Hypertension     Incontinence     Insomnia     Nocturia     SCC (squamous cell carcinoma), scalp/neck     right neck       Past Surgical History:   Procedure Laterality Date    COLONOSCOPY      HIATAL HERNIA REPAIR  2023    HYSTERECTOMY      JOINT REPLACEMENT Right     KNEE SURGERY Right     NEPHRECTOMY N/A 05/13/2020    Procedure: NEPHRECTOMY LAPAROSCOPIC WITH DAVINCI ROBOT;  Surgeon: Quinton Rainey MD;  [facility];  [facility];  Laterality: N/A;    SKIN CANCER EXCISION Left     left hand 10/2022       Social History     Socioeconomic History    Marital status: Legally    Tobacco Use    Smoking status: Never    Smokeless tobacco: Never   Vaping Use    Vaping status: Never Used   Substance and Sexual Activity    Alcohol use: No    Drug use: No    Sexual activity: Defer       Family History   Problem Relation Age of Onset    Heart disease Other     Diabetes Other     Hypertension Other     Obesity Other     Kidney disease Other        Objective    There were no vitals taken for this visit.    Physical Exam  Nursing note reviewed.   Constitutional:       General: She is not in acute distress.     Appearance: Normal appearance. She is not ill-appearing.   HENT:      Nose: No congestion.   Abdominal:      Tenderness: There is no right CVA tenderness or left CVA tenderness.      Hernia: No hernia is present.   Skin:     General: Skin is warm and dry.    Neurological:      Mental Status: She is alert and oriented to person, place, and time.   Psychiatric:         Mood and Affect: Mood normal.         Behavior: Behavior normal.             Results for orders placed or performed during the hospital encounter of 08/10/22   Fine Needle Aspiration    Collection Time: 08/10/22 11:20 AM    Specimen: Thyroid; Body Fluid   Result Value Ref Range    Note to Patients       This report may contain a detailed description of human tissue sent by a health care provider to the laboratory for pathologic evaluation. The content of this report is essential for diagnosis and may provide important critical findings. This information may be unfamiliar to patients to review without a medical professional present. It is advised that the patient review this report in the presence of a health care provider who can answer questions and explain the results.      Case Report       Medical Cytology Report                           Case: [id number]                                 Authorizing Provider:  Ludivina Yu APRN    Collected:           08/10/2022 11:20 AM          Ordering Location:     [facility]  Received:            08/10/2022 01:16 PM          Pathologist:           Jadiel Bergman MD                                                        Specimen:    Thyroid, right                                                                             Final Diagnosis       Thyroid, right lobe, fine-needle aspiration:  [place] category 2: Benign follicular nodule.      Clinical Information       Hx: 1.5 X 0.7 X 1.3 cm right thyroid nodule, multiple thyroid nodules      Gross Description       1. Thyroid.  Received in cytolyt labeled with patient name and  designated as right thyroid FNA.  30 mls with blood tinged aspirate.  2 smears fixed in 95% alcohol, 2 smears air dried, and 1 thin prep slide prepared.      Intraoperative Consultation       1. Thyroid.  Fine  Needle Aspiration, Right Thyroid:         Pass 1: Adequate        Pass 2: Not reviewed       Pass 3: Cytolyt       Pass 4: Cytolyt       Pass 5: Cytolyt         Jasmyne Rivas, CT(ASCP)L                 08/10/2022      Microscopic Description       Cytologic evaluation shows a low cellularity aspirate consisting of scattered macrofollicular groups of bland appearing follicular cells in a background of watery colloid and scattered macrophages.  Benign oncocytic changes are present.  No cytologic features of malignancy are seen.     XR Chest 2 View (02/17/2025 10:47) US Renal Limited (02/17/2025 13:17) Independent renal ultrasound review  The renal ultrasound is available for me to review.  Treatment recommendations require an independent review.  This film has been reviewed by the radiologist to determine any non urologic abnormalities that are presents.  I inspected the kidneys for size, symmetry, contour, parenchymal thickness, perinephric reaction, presence of calcifications, and intrarenal dilation of the collecting system.  This US shows right kidney absent.  No evidence of tissue regrowth.  Left kidney no abnormality seen.  No hydronephrosis.  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

## 2025-08-27 ENCOUNTER — OFFICE VISIT (OUTPATIENT)
Dept: OTOLARYNGOLOGY | Facility: CLINIC | Age: 80
End: 2025-08-27
Payer: MEDICARE

## 2025-08-27 ENCOUNTER — HOSPITAL ENCOUNTER (EMERGENCY)
Facility: HOSPITAL | Age: 80
Discharge: HOME | End: 2025-08-27
Attending: FAMILY MEDICINE | Admitting: FAMILY MEDICINE
Payer: MEDICARE

## 2025-08-27 VITALS
OXYGEN SATURATION: 99 % | SYSTOLIC BLOOD PRESSURE: 175 MMHG | DIASTOLIC BLOOD PRESSURE: 81 MMHG | BODY MASS INDEX: 26.06 KG/M2 | RESPIRATION RATE: 16 BRPM | HEIGHT: 61 IN | TEMPERATURE: 98.5 F | WEIGHT: 138 LBS | HEART RATE: 61 BPM

## 2025-08-27 VITALS
DIASTOLIC BLOOD PRESSURE: 110 MMHG | BODY MASS INDEX: 26.21 KG/M2 | TEMPERATURE: 96.8 F | HEIGHT: 61 IN | SYSTOLIC BLOOD PRESSURE: 210 MMHG | WEIGHT: 138.8 LBS | HEART RATE: 56 BPM

## 2025-08-27 DIAGNOSIS — I10 ELEVATED BLOOD PRESSURE READING WITH DIAGNOSIS OF HYPERTENSION: Primary | ICD-10-CM

## 2025-08-27 DIAGNOSIS — E04.2 NONTOXIC MULTINODULAR GOITER: Primary | ICD-10-CM

## 2025-08-27 LAB
ANION GAP SERPL CALCULATED.3IONS-SCNC: 14 MMOL/L (ref 5–15)
BASOPHILS # BLD AUTO: 0.05 10*3/MM3 (ref 0–0.2)
BASOPHILS NFR BLD AUTO: 0.7 % (ref 0–1.5)
BUN SERPL-MCNC: 24.9 MG/DL (ref 8–23)
BUN/CREAT SERPL: 15.4 (ref 7–25)
CALCIUM SPEC-SCNC: 9.9 MG/DL (ref 8.6–10.5)
CHLORIDE SERPL-SCNC: 100 MMOL/L (ref 98–107)
CO2 SERPL-SCNC: 20 MMOL/L (ref 22–29)
CREAT SERPL-MCNC: 1.62 MG/DL (ref 0.57–1)
DEPRECATED RDW RBC AUTO: 47.3 FL (ref 37–54)
EGFRCR SERPLBLD CKD-EPI 2021: 32 ML/MIN/1.73
EOSINOPHIL # BLD AUTO: 0.19 10*3/MM3 (ref 0–0.4)
EOSINOPHIL NFR BLD AUTO: 2.7 % (ref 0.3–6.2)
ERYTHROCYTE [DISTWIDTH] IN BLOOD BY AUTOMATED COUNT: 13.9 % (ref 12.3–15.4)
GLUCOSE SERPL-MCNC: 83 MG/DL (ref 65–99)
HCT VFR BLD AUTO: 42.9 % (ref 34–46.6)
HGB BLD-MCNC: 14.1 G/DL (ref 12–15.9)
IMM GRANULOCYTES # BLD AUTO: 0.03 10*3/MM3 (ref 0–0.05)
IMM GRANULOCYTES NFR BLD AUTO: 0.4 % (ref 0–0.5)
LYMPHOCYTES # BLD AUTO: 1.07 10*3/MM3 (ref 0.7–3.1)
LYMPHOCYTES NFR BLD AUTO: 15.4 % (ref 19.6–45.3)
MAGNESIUM SERPL-MCNC: 2.4 MG/DL (ref 1.6–2.4)
MCH RBC QN AUTO: 30.3 PG (ref 26.6–33)
MCHC RBC AUTO-ENTMCNC: 32.9 G/DL (ref 31.5–35.7)
MCV RBC AUTO: 92.3 FL (ref 79–97)
MONOCYTES # BLD AUTO: 0.57 10*3/MM3 (ref 0.1–0.9)
MONOCYTES NFR BLD AUTO: 8.2 % (ref 5–12)
NEUTROPHILS NFR BLD AUTO: 5.05 10*3/MM3 (ref 1.7–7)
NEUTROPHILS NFR BLD AUTO: 72.6 % (ref 42.7–76)
NRBC BLD AUTO-RTO: 0 /100 WBC (ref 0–0.2)
PLATELET # BLD AUTO: 207 10*3/MM3 (ref 140–450)
PMV BLD AUTO: 10.1 FL (ref 6–12)
POTASSIUM SERPL-SCNC: 3.9 MMOL/L (ref 3.5–5.2)
RBC # BLD AUTO: 4.65 10*6/MM3 (ref 3.77–5.28)
SODIUM SERPL-SCNC: 134 MMOL/L (ref 136–145)
WBC NRBC COR # BLD AUTO: 6.96 10*3/MM3 (ref 3.4–10.8)

## 2025-08-27 PROCEDURE — 83735 ASSAY OF MAGNESIUM: CPT | Performed by: FAMILY MEDICINE

## 2025-08-27 PROCEDURE — 25010000002 HYDRALAZINE PER 20 MG: Performed by: FAMILY MEDICINE

## 2025-08-27 PROCEDURE — 99283 EMERGENCY DEPT VISIT LOW MDM: CPT | Performed by: FAMILY MEDICINE

## 2025-08-27 PROCEDURE — 85025 COMPLETE CBC W/AUTO DIFF WBC: CPT | Performed by: FAMILY MEDICINE

## 2025-08-27 PROCEDURE — 96374 THER/PROPH/DIAG INJ IV PUSH: CPT

## 2025-08-27 PROCEDURE — 80048 BASIC METABOLIC PNL TOTAL CA: CPT | Performed by: FAMILY MEDICINE

## 2025-08-27 RX ORDER — HYDRALAZINE HYDROCHLORIDE 20 MG/ML
20 INJECTION INTRAMUSCULAR; INTRAVENOUS ONCE
Status: COMPLETED | OUTPATIENT
Start: 2025-08-27 | End: 2025-08-27

## 2025-08-27 RX ORDER — SODIUM CHLORIDE 0.9 % (FLUSH) 0.9 %
10 SYRINGE (ML) INJECTION AS NEEDED
Status: DISCONTINUED | OUTPATIENT
Start: 2025-08-27 | End: 2025-08-27 | Stop reason: HOSPADM

## 2025-08-27 RX ADMIN — HYDRALAZINE HYDROCHLORIDE 20 MG: 20 INJECTION, SOLUTION INTRAMUSCULAR; INTRAVENOUS at 12:45

## (undated) DEVICE — ARM DRAPE

## (undated) DEVICE — LP VESL MAXI 2.5X1MM RED 2PK

## (undated) DEVICE — SUT PDS LP 1 TP1 96IN VIO PDP880GA

## (undated) DEVICE — GLV SURG SENSICARE W/ALOE PF LF SZ6 STRL

## (undated) DEVICE — GLV SURG BIOGEL M LTX PF 8

## (undated) DEVICE — TROC ANCHORPORT BLADELES W/GRIP 12X120MM

## (undated) DEVICE — SUT SILK 2/0 SH 75CM 30IN BLK C016D

## (undated) DEVICE — SUREFORM 45 CURVED-TIP: Brand: SUREFORM

## (undated) DEVICE — SUT SILK 0 SUTUPAK TIES 24IN SA76G

## (undated) DEVICE — REDUCER: Brand: ENDOWRIST

## (undated) DEVICE — SEAL

## (undated) DEVICE — TIP COVER ACCESSORY

## (undated) DEVICE — ANTIBACTERIAL UNDYED BRAIDED (POLYGLACTIN 910), SYNTHETIC ABSORBABLE SUTURE: Brand: COATED VICRYL

## (undated) DEVICE — LP VESL MINI BLU PK/2

## (undated) DEVICE — SUT VIC 1 SUTUPAK TIES 18IN J913G

## (undated) DEVICE — DAVINCI: Brand: MEDLINE INDUSTRIES, INC.

## (undated) DEVICE — SPECIMEN RETRIEVAL POUCH: Brand: ENDO CATCH II

## (undated) DEVICE — SYS CLOSE PORTII CARTR/THOMASN XL

## (undated) DEVICE — ST TBG AIRSEAL FLTR TRI LUM

## (undated) DEVICE — PK TURNOVER RM ADV

## (undated) DEVICE — GLV SURG PREMIERPRO ORTHO LTX PF SZ6.5 BRN

## (undated) DEVICE — 30977 SEE SHARP - ENHANCED INTRAOPERATIVE LAPAROSCOPE CLEANING & DEFOGGING: Brand: 30977 SEE SHARP - ENHANCED INTRAOPERATIVE LAPAROSCOPE CLEANING & DEFOGGING

## (undated) DEVICE — SUT PROLN 4/0 RB1 D/A 36IN 8557H

## (undated) DEVICE — SKIN AFFIX SURG ADHESIVE 72/CS 0.55ML: Brand: MEDLINE

## (undated) DEVICE — BLADELESS OBTURATOR: Brand: WECK VISTA

## (undated) DEVICE — CANNULA SEAL

## (undated) DEVICE — ENDOPATH XCEL WITH OPTIVIEW TECHNOLOGY BLADELESS TROCARS WITH STABILITY SLEEVES: Brand: ENDOPATH XCEL OPTIVIEW